# Patient Record
Sex: FEMALE | Race: BLACK OR AFRICAN AMERICAN | NOT HISPANIC OR LATINO | URBAN - METROPOLITAN AREA
[De-identification: names, ages, dates, MRNs, and addresses within clinical notes are randomized per-mention and may not be internally consistent; named-entity substitution may affect disease eponyms.]

---

## 2019-01-01 ENCOUNTER — OUTPATIENT (OUTPATIENT)
Dept: OUTPATIENT SERVICES | Facility: HOSPITAL | Age: 84
LOS: 1 days | Discharge: HOME | End: 2019-01-01

## 2019-01-01 ENCOUNTER — TRANSCRIPTION ENCOUNTER (OUTPATIENT)
Age: 84
End: 2019-01-01

## 2019-01-01 ENCOUNTER — INPATIENT (INPATIENT)
Facility: HOSPITAL | Age: 84
LOS: 8 days | Discharge: NURSING, REHAB OR LONG-TERM CARE | End: 2019-05-17
Attending: INTERNAL MEDICINE | Admitting: INTERNAL MEDICINE
Payer: MEDICARE

## 2019-01-01 VITALS
WEIGHT: 119.93 LBS | HEART RATE: 72 BPM | HEIGHT: 60 IN | OXYGEN SATURATION: 96 % | RESPIRATION RATE: 20 BRPM | TEMPERATURE: 98 F | DIASTOLIC BLOOD PRESSURE: 83 MMHG | SYSTOLIC BLOOD PRESSURE: 168 MMHG

## 2019-01-01 VITALS
SYSTOLIC BLOOD PRESSURE: 145 MMHG | DIASTOLIC BLOOD PRESSURE: 65 MMHG | TEMPERATURE: 99 F | HEART RATE: 73 BPM | RESPIRATION RATE: 18 BRPM

## 2019-01-01 DIAGNOSIS — Z90.49 ACQUIRED ABSENCE OF OTHER SPECIFIED PARTS OF DIGESTIVE TRACT: Chronic | ICD-10-CM
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
## 2019-01-01 DIAGNOSIS — Z95.1 PRESENCE OF AORTOCORONARY BYPASS GRAFT: ICD-10-CM

## 2019-01-01 DIAGNOSIS — D62 ACUTE POSTHEMORRHAGIC ANEMIA: ICD-10-CM

## 2019-01-01 DIAGNOSIS — N17.9 ACUTE KIDNEY FAILURE, UNSPECIFIED: ICD-10-CM

## 2019-01-01 DIAGNOSIS — I80.8 PHLEBITIS AND THROMBOPHLEBITIS OF OTHER SITES: ICD-10-CM

## 2019-01-01 DIAGNOSIS — I10 ESSENTIAL (PRIMARY) HYPERTENSION: ICD-10-CM

## 2019-01-01 DIAGNOSIS — N39.0 URINARY TRACT INFECTION, SITE NOT SPECIFIED: ICD-10-CM

## 2019-01-01 DIAGNOSIS — N18.4 CHRONIC KIDNEY DISEASE, STAGE 4 (SEVERE): ICD-10-CM

## 2019-01-01 DIAGNOSIS — I73.9 PERIPHERAL VASCULAR DISEASE, UNSPECIFIED: ICD-10-CM

## 2019-01-01 DIAGNOSIS — Z95.5 PRESENCE OF CORONARY ANGIOPLASTY IMPLANT AND GRAFT: ICD-10-CM
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
## 2019-01-01 DIAGNOSIS — I12.9 HYPERTENSIVE CHRONIC KIDNEY DISEASE WITH STAGE 1 THROUGH STAGE 4 CHRONIC KIDNEY DISEASE, OR UNSPECIFIED CHRONIC KIDNEY DISEASE: ICD-10-CM
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
## 2019-01-01 DIAGNOSIS — I25.10 ATHEROSCLEROTIC HEART DISEASE OF NATIVE CORONARY ARTERY WITHOUT ANGINA PECTORIS: ICD-10-CM

## 2019-01-01 DIAGNOSIS — M25.552 PAIN IN LEFT HIP: ICD-10-CM

## 2019-01-01 DIAGNOSIS — D64.9 ANEMIA, UNSPECIFIED: ICD-10-CM

## 2019-01-01 DIAGNOSIS — S72.22XA DISPLACED SUBTROCHANTERIC FRACTURE OF LEFT FEMUR, INITIAL ENCOUNTER FOR CLOSED FRACTURE: ICD-10-CM

## 2019-01-01 DIAGNOSIS — E83.39 OTHER DISORDERS OF PHOSPHORUS METABOLISM: ICD-10-CM

## 2019-01-01 DIAGNOSIS — R10.9 UNSPECIFIED ABDOMINAL PAIN: ICD-10-CM

## 2019-01-01 LAB
-  AMIKACIN: SIGNIFICANT CHANGE UP
-  AZTREONAM: SIGNIFICANT CHANGE UP
-  CEFEPIME: SIGNIFICANT CHANGE UP
-  CEFTAZIDIME: SIGNIFICANT CHANGE UP
-  CIPROFLOXACIN: SIGNIFICANT CHANGE UP
-  GENTAMICIN: SIGNIFICANT CHANGE UP
-  IMIPENEM: SIGNIFICANT CHANGE UP
-  LEVOFLOXACIN: SIGNIFICANT CHANGE UP
-  MEROPENEM: SIGNIFICANT CHANGE UP
-  PIPERACILLIN/TAZOBACTAM: SIGNIFICANT CHANGE UP
-  TOBRAMYCIN: SIGNIFICANT CHANGE UP
ABO RH CONFIRMATION: SIGNIFICANT CHANGE UP
ALBUMIN SERPL ELPH-MCNC: 2.3 G/DL — LOW (ref 3.5–5.2)
ALBUMIN SERPL ELPH-MCNC: 2.5 G/DL — LOW (ref 3.5–5.2)
ALBUMIN SERPL ELPH-MCNC: 2.5 G/DL — LOW (ref 3.5–5.2)
ALBUMIN SERPL ELPH-MCNC: 2.7 G/DL — LOW (ref 3.5–5.2)
ALBUMIN SERPL ELPH-MCNC: 2.8 G/DL — LOW (ref 3.5–5.2)
ALBUMIN SERPL ELPH-MCNC: 3.2 G/DL — LOW (ref 3.5–5.2)
ALBUMIN SERPL ELPH-MCNC: 3.7 G/DL — SIGNIFICANT CHANGE UP (ref 3.5–5.2)
ALBUMIN SERPL ELPH-MCNC: 3.9 G/DL — SIGNIFICANT CHANGE UP (ref 3.5–5.2)
ALP SERPL-CCNC: 35 U/L — SIGNIFICANT CHANGE UP (ref 30–115)
ALP SERPL-CCNC: 37 U/L — SIGNIFICANT CHANGE UP (ref 30–115)
ALP SERPL-CCNC: 38 U/L — SIGNIFICANT CHANGE UP (ref 30–115)
ALP SERPL-CCNC: 38 U/L — SIGNIFICANT CHANGE UP (ref 30–115)
ALP SERPL-CCNC: 40 U/L — SIGNIFICANT CHANGE UP (ref 30–115)
ALP SERPL-CCNC: 41 U/L — SIGNIFICANT CHANGE UP (ref 30–115)
ALP SERPL-CCNC: 44 U/L — SIGNIFICANT CHANGE UP (ref 30–115)
ALP SERPL-CCNC: 49 U/L — SIGNIFICANT CHANGE UP (ref 30–115)
ALP SERPL-CCNC: 58 U/L — SIGNIFICANT CHANGE UP (ref 30–115)
ALP SERPL-CCNC: 61 U/L — SIGNIFICANT CHANGE UP (ref 30–115)
ALT FLD-CCNC: 10 U/L — SIGNIFICANT CHANGE UP (ref 0–41)
ALT FLD-CCNC: 12 U/L — SIGNIFICANT CHANGE UP (ref 0–41)
ALT FLD-CCNC: 13 U/L — SIGNIFICANT CHANGE UP (ref 0–41)
ALT FLD-CCNC: 13 U/L — SIGNIFICANT CHANGE UP (ref 0–41)
ALT FLD-CCNC: 16 U/L — SIGNIFICANT CHANGE UP (ref 0–41)
ALT FLD-CCNC: 17 U/L — SIGNIFICANT CHANGE UP (ref 0–41)
ALT FLD-CCNC: 22 U/L — SIGNIFICANT CHANGE UP (ref 0–41)
ALT FLD-CCNC: 5 U/L — SIGNIFICANT CHANGE UP (ref 0–41)
ALT FLD-CCNC: 6 U/L — SIGNIFICANT CHANGE UP (ref 0–41)
ALT FLD-CCNC: 9 U/L — SIGNIFICANT CHANGE UP (ref 0–41)
ANION GAP SERPL CALC-SCNC: 10 MMOL/L — SIGNIFICANT CHANGE UP (ref 7–14)
ANION GAP SERPL CALC-SCNC: 11 MMOL/L — SIGNIFICANT CHANGE UP (ref 7–14)
ANION GAP SERPL CALC-SCNC: 12 MMOL/L — SIGNIFICANT CHANGE UP (ref 7–14)
ANION GAP SERPL CALC-SCNC: 13 MMOL/L — SIGNIFICANT CHANGE UP (ref 7–14)
ANION GAP SERPL CALC-SCNC: 9 MMOL/L — SIGNIFICANT CHANGE UP (ref 7–14)
ANISOCYTOSIS BLD QL: SLIGHT — SIGNIFICANT CHANGE UP
APPEARANCE UR: ABNORMAL
APPEARANCE UR: CLEAR — SIGNIFICANT CHANGE UP
APTT BLD: 30.2 SEC — SIGNIFICANT CHANGE UP (ref 27–39.2)
AST SERPL-CCNC: 18 U/L — SIGNIFICANT CHANGE UP (ref 0–41)
AST SERPL-CCNC: 19 U/L — SIGNIFICANT CHANGE UP (ref 0–41)
AST SERPL-CCNC: 20 U/L — SIGNIFICANT CHANGE UP (ref 0–41)
AST SERPL-CCNC: 20 U/L — SIGNIFICANT CHANGE UP (ref 0–41)
AST SERPL-CCNC: 22 U/L — SIGNIFICANT CHANGE UP (ref 0–41)
AST SERPL-CCNC: 22 U/L — SIGNIFICANT CHANGE UP (ref 0–41)
AST SERPL-CCNC: 23 U/L — SIGNIFICANT CHANGE UP (ref 0–41)
AST SERPL-CCNC: 24 U/L — SIGNIFICANT CHANGE UP (ref 0–41)
AST SERPL-CCNC: 25 U/L — SIGNIFICANT CHANGE UP (ref 0–41)
AST SERPL-CCNC: 27 U/L — SIGNIFICANT CHANGE UP (ref 0–41)
BASOPHILS # BLD AUTO: 0 K/UL — SIGNIFICANT CHANGE UP (ref 0–0.2)
BASOPHILS # BLD AUTO: 0.01 K/UL — SIGNIFICANT CHANGE UP (ref 0–0.2)
BASOPHILS # BLD AUTO: 0.01 K/UL — SIGNIFICANT CHANGE UP (ref 0–0.2)
BASOPHILS # BLD AUTO: 0.02 K/UL — SIGNIFICANT CHANGE UP (ref 0–0.2)
BASOPHILS # BLD AUTO: 0.02 K/UL — SIGNIFICANT CHANGE UP (ref 0–0.2)
BASOPHILS # BLD AUTO: 0.03 K/UL — SIGNIFICANT CHANGE UP (ref 0–0.2)
BASOPHILS # BLD AUTO: 0.03 K/UL — SIGNIFICANT CHANGE UP (ref 0–0.2)
BASOPHILS # BLD AUTO: 0.04 K/UL — SIGNIFICANT CHANGE UP (ref 0–0.2)
BASOPHILS NFR BLD AUTO: 0 % — SIGNIFICANT CHANGE UP (ref 0–1)
BASOPHILS NFR BLD AUTO: 0.1 % — SIGNIFICANT CHANGE UP (ref 0–1)
BASOPHILS NFR BLD AUTO: 0.1 % — SIGNIFICANT CHANGE UP (ref 0–1)
BASOPHILS NFR BLD AUTO: 0.2 % — SIGNIFICANT CHANGE UP (ref 0–1)
BASOPHILS NFR BLD AUTO: 0.2 % — SIGNIFICANT CHANGE UP (ref 0–1)
BASOPHILS NFR BLD AUTO: 0.3 % — SIGNIFICANT CHANGE UP (ref 0–1)
BASOPHILS NFR BLD AUTO: 0.4 % — SIGNIFICANT CHANGE UP (ref 0–1)
BASOPHILS NFR BLD AUTO: 0.4 % — SIGNIFICANT CHANGE UP (ref 0–1)
BASOPHILS NFR BLD AUTO: 0.5 % — SIGNIFICANT CHANGE UP (ref 0–1)
BASOPHILS NFR BLD AUTO: 0.5 % — SIGNIFICANT CHANGE UP (ref 0–1)
BILIRUB SERPL-MCNC: 0.3 MG/DL — SIGNIFICANT CHANGE UP (ref 0.2–1.2)
BILIRUB SERPL-MCNC: 0.4 MG/DL — SIGNIFICANT CHANGE UP (ref 0.2–1.2)
BILIRUB SERPL-MCNC: 0.4 MG/DL — SIGNIFICANT CHANGE UP (ref 0.2–1.2)
BILIRUB SERPL-MCNC: 0.5 MG/DL — SIGNIFICANT CHANGE UP (ref 0.2–1.2)
BILIRUB SERPL-MCNC: 0.6 MG/DL — SIGNIFICANT CHANGE UP (ref 0.2–1.2)
BILIRUB SERPL-MCNC: 0.8 MG/DL — SIGNIFICANT CHANGE UP (ref 0.2–1.2)
BILIRUB SERPL-MCNC: 0.8 MG/DL — SIGNIFICANT CHANGE UP (ref 0.2–1.2)
BILIRUB SERPL-MCNC: 1.5 MG/DL — HIGH (ref 0.2–1.2)
BILIRUB SERPL-MCNC: 1.5 MG/DL — HIGH (ref 0.2–1.2)
BILIRUB SERPL-MCNC: 1.8 MG/DL — HIGH (ref 0.2–1.2)
BILIRUB UR-MCNC: NEGATIVE — SIGNIFICANT CHANGE UP
BILIRUB UR-MCNC: NEGATIVE — SIGNIFICANT CHANGE UP
BLD GP AB SCN SERPL QL: SIGNIFICANT CHANGE UP
BUN SERPL-MCNC: 27 MG/DL — HIGH (ref 10–20)
BUN SERPL-MCNC: 27 MG/DL — HIGH (ref 10–20)
BUN SERPL-MCNC: 30 MG/DL — HIGH (ref 10–20)
BUN SERPL-MCNC: 30 MG/DL — HIGH (ref 10–20)
BUN SERPL-MCNC: 31 MG/DL — HIGH (ref 10–20)
BUN SERPL-MCNC: 33 MG/DL — HIGH (ref 10–20)
BUN SERPL-MCNC: 33 MG/DL — HIGH (ref 10–20)
BUN SERPL-MCNC: 40 MG/DL — HIGH (ref 10–20)
BUN SERPL-MCNC: 42 MG/DL — HIGH (ref 10–20)
BUN SERPL-MCNC: 50 MG/DL — HIGH (ref 10–20)
CALCIUM SERPL-MCNC: 7.2 MG/DL — LOW (ref 8.5–10.1)
CALCIUM SERPL-MCNC: 7.4 MG/DL — LOW (ref 8.5–10.1)
CALCIUM SERPL-MCNC: 7.4 MG/DL — LOW (ref 8.5–10.1)
CALCIUM SERPL-MCNC: 7.5 MG/DL — LOW (ref 8.5–10.1)
CALCIUM SERPL-MCNC: 7.6 MG/DL — LOW (ref 8.5–10.1)
CALCIUM SERPL-MCNC: 7.7 MG/DL — LOW (ref 8.5–10.1)
CALCIUM SERPL-MCNC: 7.9 MG/DL — LOW (ref 8.5–10.1)
CALCIUM SERPL-MCNC: 8.3 MG/DL — LOW (ref 8.5–10.1)
CALCIUM SERPL-MCNC: 8.5 MG/DL — SIGNIFICANT CHANGE UP (ref 8.5–10.1)
CALCIUM SERPL-MCNC: 8.9 MG/DL — SIGNIFICANT CHANGE UP (ref 8.5–10.1)
CHLORIDE SERPL-SCNC: 107 MMOL/L — SIGNIFICANT CHANGE UP (ref 98–110)
CHLORIDE SERPL-SCNC: 108 MMOL/L — SIGNIFICANT CHANGE UP (ref 98–110)
CHLORIDE SERPL-SCNC: 111 MMOL/L — HIGH (ref 98–110)
CO2 SERPL-SCNC: 22 MMOL/L — SIGNIFICANT CHANGE UP (ref 17–32)
CO2 SERPL-SCNC: 23 MMOL/L — SIGNIFICANT CHANGE UP (ref 17–32)
CO2 SERPL-SCNC: 24 MMOL/L — SIGNIFICANT CHANGE UP (ref 17–32)
CO2 SERPL-SCNC: 24 MMOL/L — SIGNIFICANT CHANGE UP (ref 17–32)
CO2 SERPL-SCNC: 25 MMOL/L — SIGNIFICANT CHANGE UP (ref 17–32)
COLOR SPEC: YELLOW — SIGNIFICANT CHANGE UP
COLOR SPEC: YELLOW — SIGNIFICANT CHANGE UP
CREAT ?TM UR-MCNC: 97 MG/DL — SIGNIFICANT CHANGE UP
CREAT SERPL-MCNC: 1.3 MG/DL — SIGNIFICANT CHANGE UP (ref 0.7–1.5)
CREAT SERPL-MCNC: 1.3 MG/DL — SIGNIFICANT CHANGE UP (ref 0.7–1.5)
CREAT SERPL-MCNC: 1.4 MG/DL — SIGNIFICANT CHANGE UP (ref 0.7–1.5)
CREAT SERPL-MCNC: 1.4 MG/DL — SIGNIFICANT CHANGE UP (ref 0.7–1.5)
CREAT SERPL-MCNC: 1.5 MG/DL — SIGNIFICANT CHANGE UP (ref 0.7–1.5)
CREAT SERPL-MCNC: 1.7 MG/DL — HIGH (ref 0.7–1.5)
CREAT SERPL-MCNC: 1.7 MG/DL — HIGH (ref 0.7–1.5)
CREAT SERPL-MCNC: 1.8 MG/DL — HIGH (ref 0.7–1.5)
CREAT SERPL-MCNC: 2.1 MG/DL — HIGH (ref 0.7–1.5)
CREAT SERPL-MCNC: 2.2 MG/DL — HIGH (ref 0.7–1.5)
CULTURE RESULTS: SIGNIFICANT CHANGE UP
DIFF PNL FLD: ABNORMAL
DIFF PNL FLD: ABNORMAL
EOSINOPHIL # BLD AUTO: 0 K/UL — SIGNIFICANT CHANGE UP (ref 0–0.7)
EOSINOPHIL # BLD AUTO: 0 K/UL — SIGNIFICANT CHANGE UP (ref 0–0.7)
EOSINOPHIL # BLD AUTO: 0.05 K/UL — SIGNIFICANT CHANGE UP (ref 0–0.7)
EOSINOPHIL # BLD AUTO: 0.08 K/UL — SIGNIFICANT CHANGE UP (ref 0–0.7)
EOSINOPHIL # BLD AUTO: 0.08 K/UL — SIGNIFICANT CHANGE UP (ref 0–0.7)
EOSINOPHIL # BLD AUTO: 0.15 K/UL — SIGNIFICANT CHANGE UP (ref 0–0.7)
EOSINOPHIL # BLD AUTO: 0.21 K/UL — SIGNIFICANT CHANGE UP (ref 0–0.7)
EOSINOPHIL # BLD AUTO: 0.22 K/UL — SIGNIFICANT CHANGE UP (ref 0–0.7)
EOSINOPHIL # BLD AUTO: 0.22 K/UL — SIGNIFICANT CHANGE UP (ref 0–0.7)
EOSINOPHIL # BLD AUTO: 0.27 K/UL — SIGNIFICANT CHANGE UP (ref 0–0.7)
EOSINOPHIL NFR BLD AUTO: 0 % — SIGNIFICANT CHANGE UP (ref 0–8)
EOSINOPHIL NFR BLD AUTO: 0 % — SIGNIFICANT CHANGE UP (ref 0–8)
EOSINOPHIL NFR BLD AUTO: 0.5 % — SIGNIFICANT CHANGE UP (ref 0–8)
EOSINOPHIL NFR BLD AUTO: 0.9 % — SIGNIFICANT CHANGE UP (ref 0–8)
EOSINOPHIL NFR BLD AUTO: 1 % — SIGNIFICANT CHANGE UP (ref 0–8)
EOSINOPHIL NFR BLD AUTO: 1.3 % — SIGNIFICANT CHANGE UP (ref 0–8)
EOSINOPHIL NFR BLD AUTO: 2.4 % — SIGNIFICANT CHANGE UP (ref 0–8)
EOSINOPHIL NFR BLD AUTO: 2.7 % — SIGNIFICANT CHANGE UP (ref 0–8)
EPI CELLS # UR: ABNORMAL /HPF
GIANT PLATELETS BLD QL SMEAR: PRESENT — SIGNIFICANT CHANGE UP
GLUCOSE SERPL-MCNC: 103 MG/DL — HIGH (ref 70–99)
GLUCOSE SERPL-MCNC: 113 MG/DL — HIGH (ref 70–99)
GLUCOSE SERPL-MCNC: 114 MG/DL — HIGH (ref 70–99)
GLUCOSE SERPL-MCNC: 114 MG/DL — HIGH (ref 70–99)
GLUCOSE SERPL-MCNC: 116 MG/DL — HIGH (ref 70–99)
GLUCOSE SERPL-MCNC: 138 MG/DL — HIGH (ref 70–99)
GLUCOSE SERPL-MCNC: 97 MG/DL — SIGNIFICANT CHANGE UP (ref 70–99)
GLUCOSE SERPL-MCNC: 97 MG/DL — SIGNIFICANT CHANGE UP (ref 70–99)
GLUCOSE SERPL-MCNC: 98 MG/DL — SIGNIFICANT CHANGE UP (ref 70–99)
GLUCOSE SERPL-MCNC: 98 MG/DL — SIGNIFICANT CHANGE UP (ref 70–99)
GLUCOSE UR QL: NEGATIVE MG/DL — SIGNIFICANT CHANGE UP
GLUCOSE UR QL: NEGATIVE MG/DL — SIGNIFICANT CHANGE UP
HCT VFR BLD CALC: 17.2 % — LOW (ref 37–47)
HCT VFR BLD CALC: 18.4 % — LOW (ref 37–47)
HCT VFR BLD CALC: 21 % — LOW (ref 37–47)
HCT VFR BLD CALC: 21.1 % — LOW (ref 37–47)
HCT VFR BLD CALC: 21.2 % — LOW (ref 37–47)
HCT VFR BLD CALC: 22 % — LOW (ref 37–47)
HCT VFR BLD CALC: 22.2 % — LOW (ref 37–47)
HCT VFR BLD CALC: 23.3 % — LOW (ref 37–47)
HCT VFR BLD CALC: 24.2 % — LOW (ref 37–47)
HCT VFR BLD CALC: 24.3 % — LOW (ref 37–47)
HCT VFR BLD CALC: 24.4 % — LOW (ref 37–47)
HCT VFR BLD CALC: 24.8 % — LOW (ref 37–47)
HCT VFR BLD CALC: 25.2 % — LOW (ref 37–47)
HCT VFR BLD CALC: 25.6 % — LOW (ref 37–47)
HCT VFR BLD CALC: 26 % — LOW (ref 37–47)
HCT VFR BLD CALC: 27.3 % — LOW (ref 37–47)
HCT VFR BLD CALC: 27.3 % — LOW (ref 37–47)
HCT VFR BLD CALC: 27.6 % — LOW (ref 37–47)
HCT VFR BLD CALC: 27.9 % — LOW (ref 37–47)
HGB BLD-MCNC: 6.1 G/DL — CRITICAL LOW (ref 12–16)
HGB BLD-MCNC: 6.3 G/DL — CRITICAL LOW (ref 12–16)
HGB BLD-MCNC: 7.3 G/DL — LOW (ref 12–16)
HGB BLD-MCNC: 7.3 G/DL — LOW (ref 12–16)
HGB BLD-MCNC: 7.4 G/DL — LOW (ref 12–16)
HGB BLD-MCNC: 7.5 G/DL — LOW (ref 12–16)
HGB BLD-MCNC: 7.7 G/DL — LOW (ref 12–16)
HGB BLD-MCNC: 8.1 G/DL — LOW (ref 12–16)
HGB BLD-MCNC: 8.2 G/DL — LOW (ref 12–16)
HGB BLD-MCNC: 8.3 G/DL — LOW (ref 12–16)
HGB BLD-MCNC: 8.3 G/DL — LOW (ref 12–16)
HGB BLD-MCNC: 8.4 G/DL — LOW (ref 12–16)
HGB BLD-MCNC: 8.6 G/DL — LOW (ref 12–16)
HGB BLD-MCNC: 8.7 G/DL — LOW (ref 12–16)
HGB BLD-MCNC: 8.8 G/DL — LOW (ref 12–16)
HGB BLD-MCNC: 9.4 G/DL — LOW (ref 12–16)
HGB BLD-MCNC: 9.6 G/DL — LOW (ref 12–16)
HGB BLD-MCNC: 9.8 G/DL — LOW (ref 12–16)
HGB BLD-MCNC: 9.8 G/DL — LOW (ref 12–16)
IMM GRANULOCYTES NFR BLD AUTO: 0.4 % — HIGH (ref 0.1–0.3)
IMM GRANULOCYTES NFR BLD AUTO: 0.6 % — HIGH (ref 0.1–0.3)
IMM GRANULOCYTES NFR BLD AUTO: 0.7 % — HIGH (ref 0.1–0.3)
IMM GRANULOCYTES NFR BLD AUTO: 0.7 % — HIGH (ref 0.1–0.3)
IMM GRANULOCYTES NFR BLD AUTO: 0.8 % — HIGH (ref 0.1–0.3)
IMM GRANULOCYTES NFR BLD AUTO: 1.2 % — HIGH (ref 0.1–0.3)
IMM GRANULOCYTES NFR BLD AUTO: 1.7 % — HIGH (ref 0.1–0.3)
IMM GRANULOCYTES NFR BLD AUTO: 1.9 % — HIGH (ref 0.1–0.3)
IMM GRANULOCYTES NFR BLD AUTO: 2.3 % — HIGH (ref 0.1–0.3)
INR BLD: 0.99 RATIO — SIGNIFICANT CHANGE UP (ref 0.65–1.3)
INR BLD: 1 RATIO — SIGNIFICANT CHANGE UP (ref 0.65–1.3)
IRON SATN MFR SERPL: 13 % — LOW (ref 15–50)
IRON SATN MFR SERPL: 32 UG/DL — LOW (ref 35–150)
KETONES UR-MCNC: NEGATIVE — SIGNIFICANT CHANGE UP
KETONES UR-MCNC: NEGATIVE — SIGNIFICANT CHANGE UP
LEUKOCYTE ESTERASE UR-ACNC: ABNORMAL
LEUKOCYTE ESTERASE UR-ACNC: NEGATIVE — SIGNIFICANT CHANGE UP
LYMPHOCYTES # BLD AUTO: 0.42 K/UL — LOW (ref 1.2–3.4)
LYMPHOCYTES # BLD AUTO: 0.44 K/UL — LOW (ref 1.2–3.4)
LYMPHOCYTES # BLD AUTO: 0.49 K/UL — LOW (ref 1.2–3.4)
LYMPHOCYTES # BLD AUTO: 0.56 K/UL — LOW (ref 1.2–3.4)
LYMPHOCYTES # BLD AUTO: 0.62 K/UL — LOW (ref 1.2–3.4)
LYMPHOCYTES # BLD AUTO: 0.63 K/UL — LOW (ref 1.2–3.4)
LYMPHOCYTES # BLD AUTO: 0.66 K/UL — LOW (ref 1.2–3.4)
LYMPHOCYTES # BLD AUTO: 0.69 K/UL — LOW (ref 1.2–3.4)
LYMPHOCYTES # BLD AUTO: 0.77 K/UL — LOW (ref 1.2–3.4)
LYMPHOCYTES # BLD AUTO: 1 K/UL — LOW (ref 1.2–3.4)
LYMPHOCYTES # BLD AUTO: 11.4 % — LOW (ref 20.5–51.1)
LYMPHOCYTES # BLD AUTO: 3.9 % — LOW (ref 20.5–51.1)
LYMPHOCYTES # BLD AUTO: 5.3 % — LOW (ref 20.5–51.1)
LYMPHOCYTES # BLD AUTO: 5.4 % — LOW (ref 20.5–51.1)
LYMPHOCYTES # BLD AUTO: 5.6 % — LOW (ref 20.5–51.1)
LYMPHOCYTES # BLD AUTO: 5.9 % — LOW (ref 20.5–51.1)
LYMPHOCYTES # BLD AUTO: 6.1 % — LOW (ref 20.5–51.1)
LYMPHOCYTES # BLD AUTO: 7.3 % — LOW (ref 20.5–51.1)
LYMPHOCYTES # BLD AUTO: 7.6 % — LOW (ref 20.5–51.1)
LYMPHOCYTES # BLD AUTO: 9.4 % — LOW (ref 20.5–51.1)
MAGNESIUM SERPL-MCNC: 1.9 MG/DL — SIGNIFICANT CHANGE UP (ref 1.8–2.4)
MAGNESIUM SERPL-MCNC: 2.1 MG/DL — SIGNIFICANT CHANGE UP (ref 1.8–2.4)
MAGNESIUM SERPL-MCNC: 2.2 MG/DL — SIGNIFICANT CHANGE UP (ref 1.8–2.4)
MAGNESIUM SERPL-MCNC: 2.3 MG/DL — SIGNIFICANT CHANGE UP (ref 1.8–2.4)
MAGNESIUM SERPL-MCNC: 2.3 MG/DL — SIGNIFICANT CHANGE UP (ref 1.8–2.4)
MAGNESIUM SERPL-MCNC: 2.4 MG/DL — SIGNIFICANT CHANGE UP (ref 1.8–2.4)
MANUAL SMEAR VERIFICATION: SIGNIFICANT CHANGE UP
MCHC RBC-ENTMCNC: 28 PG — SIGNIFICANT CHANGE UP (ref 27–31)
MCHC RBC-ENTMCNC: 28.1 PG — SIGNIFICANT CHANGE UP (ref 27–31)
MCHC RBC-ENTMCNC: 28.3 PG — SIGNIFICANT CHANGE UP (ref 27–31)
MCHC RBC-ENTMCNC: 28.4 PG — SIGNIFICANT CHANGE UP (ref 27–31)
MCHC RBC-ENTMCNC: 28.6 PG — SIGNIFICANT CHANGE UP (ref 27–31)
MCHC RBC-ENTMCNC: 28.7 PG — SIGNIFICANT CHANGE UP (ref 27–31)
MCHC RBC-ENTMCNC: 28.8 PG — SIGNIFICANT CHANGE UP (ref 27–31)
MCHC RBC-ENTMCNC: 28.9 PG — SIGNIFICANT CHANGE UP (ref 27–31)
MCHC RBC-ENTMCNC: 28.9 PG — SIGNIFICANT CHANGE UP (ref 27–31)
MCHC RBC-ENTMCNC: 29 PG — SIGNIFICANT CHANGE UP (ref 27–31)
MCHC RBC-ENTMCNC: 29.1 PG — SIGNIFICANT CHANGE UP (ref 27–31)
MCHC RBC-ENTMCNC: 29.4 PG — SIGNIFICANT CHANGE UP (ref 27–31)
MCHC RBC-ENTMCNC: 29.5 PG — SIGNIFICANT CHANGE UP (ref 27–31)
MCHC RBC-ENTMCNC: 33.5 G/DL — SIGNIFICANT CHANGE UP (ref 32–37)
MCHC RBC-ENTMCNC: 33.6 G/DL — SIGNIFICANT CHANGE UP (ref 32–37)
MCHC RBC-ENTMCNC: 33.6 G/DL — SIGNIFICANT CHANGE UP (ref 32–37)
MCHC RBC-ENTMCNC: 33.8 G/DL — SIGNIFICANT CHANGE UP (ref 32–37)
MCHC RBC-ENTMCNC: 34.1 G/DL — SIGNIFICANT CHANGE UP (ref 32–37)
MCHC RBC-ENTMCNC: 34.2 G/DL — SIGNIFICANT CHANGE UP (ref 32–37)
MCHC RBC-ENTMCNC: 34.3 G/DL — SIGNIFICANT CHANGE UP (ref 32–37)
MCHC RBC-ENTMCNC: 34.4 G/DL — SIGNIFICANT CHANGE UP (ref 32–37)
MCHC RBC-ENTMCNC: 34.4 G/DL — SIGNIFICANT CHANGE UP (ref 32–37)
MCHC RBC-ENTMCNC: 34.5 G/DL — SIGNIFICANT CHANGE UP (ref 32–37)
MCHC RBC-ENTMCNC: 34.6 G/DL — SIGNIFICANT CHANGE UP (ref 32–37)
MCHC RBC-ENTMCNC: 34.6 G/DL — SIGNIFICANT CHANGE UP (ref 32–37)
MCHC RBC-ENTMCNC: 34.7 G/DL — SIGNIFICANT CHANGE UP (ref 32–37)
MCHC RBC-ENTMCNC: 34.8 G/DL — SIGNIFICANT CHANGE UP (ref 32–37)
MCHC RBC-ENTMCNC: 35.1 G/DL — SIGNIFICANT CHANGE UP (ref 32–37)
MCHC RBC-ENTMCNC: 35.2 G/DL — SIGNIFICANT CHANGE UP (ref 32–37)
MCHC RBC-ENTMCNC: 35.2 G/DL — SIGNIFICANT CHANGE UP (ref 32–37)
MCHC RBC-ENTMCNC: 35.5 G/DL — SIGNIFICANT CHANGE UP (ref 32–37)
MCHC RBC-ENTMCNC: 35.5 G/DL — SIGNIFICANT CHANGE UP (ref 32–37)
MCV RBC AUTO: 79.7 FL — LOW (ref 81–99)
MCV RBC AUTO: 79.8 FL — LOW (ref 81–99)
MCV RBC AUTO: 81.3 FL — SIGNIFICANT CHANGE UP (ref 81–99)
MCV RBC AUTO: 81.9 FL — SIGNIFICANT CHANGE UP (ref 81–99)
MCV RBC AUTO: 81.9 FL — SIGNIFICANT CHANGE UP (ref 81–99)
MCV RBC AUTO: 82.1 FL — SIGNIFICANT CHANGE UP (ref 81–99)
MCV RBC AUTO: 82.2 FL — SIGNIFICANT CHANGE UP (ref 81–99)
MCV RBC AUTO: 82.4 FL — SIGNIFICANT CHANGE UP (ref 81–99)
MCV RBC AUTO: 82.9 FL — SIGNIFICANT CHANGE UP (ref 81–99)
MCV RBC AUTO: 83.1 FL — SIGNIFICANT CHANGE UP (ref 81–99)
MCV RBC AUTO: 83.3 FL — SIGNIFICANT CHANGE UP (ref 81–99)
MCV RBC AUTO: 83.4 FL — SIGNIFICANT CHANGE UP (ref 81–99)
MCV RBC AUTO: 83.5 FL — SIGNIFICANT CHANGE UP (ref 81–99)
MCV RBC AUTO: 84 FL — SIGNIFICANT CHANGE UP (ref 81–99)
MCV RBC AUTO: 84.3 FL — SIGNIFICANT CHANGE UP (ref 81–99)
MCV RBC AUTO: 84.4 FL — SIGNIFICANT CHANGE UP (ref 81–99)
MCV RBC AUTO: 84.4 FL — SIGNIFICANT CHANGE UP (ref 81–99)
MCV RBC AUTO: 85.5 FL — SIGNIFICANT CHANGE UP (ref 81–99)
MCV RBC AUTO: 85.6 FL — SIGNIFICANT CHANGE UP (ref 81–99)
METAMYELOCYTES # FLD: 2.7 % — HIGH (ref 0–0)
METHOD TYPE: SIGNIFICANT CHANGE UP
MONOCYTES # BLD AUTO: 0.5 K/UL — SIGNIFICANT CHANGE UP (ref 0.1–0.6)
MONOCYTES # BLD AUTO: 0.57 K/UL — SIGNIFICANT CHANGE UP (ref 0.1–0.6)
MONOCYTES # BLD AUTO: 0.88 K/UL — HIGH (ref 0.1–0.6)
MONOCYTES # BLD AUTO: 0.93 K/UL — HIGH (ref 0.1–0.6)
MONOCYTES # BLD AUTO: 1.18 K/UL — HIGH (ref 0.1–0.6)
MONOCYTES # BLD AUTO: 1.27 K/UL — HIGH (ref 0.1–0.6)
MONOCYTES # BLD AUTO: 1.41 K/UL — HIGH (ref 0.1–0.6)
MONOCYTES # BLD AUTO: 1.5 K/UL — HIGH (ref 0.1–0.6)
MONOCYTES # BLD AUTO: 1.59 K/UL — HIGH (ref 0.1–0.6)
MONOCYTES # BLD AUTO: 1.68 K/UL — HIGH (ref 0.1–0.6)
MONOCYTES NFR BLD AUTO: 10 % — HIGH (ref 1.7–9.3)
MONOCYTES NFR BLD AUTO: 11.2 % — HIGH (ref 1.7–9.3)
MONOCYTES NFR BLD AUTO: 11.9 % — HIGH (ref 1.7–9.3)
MONOCYTES NFR BLD AUTO: 12.7 % — HIGH (ref 1.7–9.3)
MONOCYTES NFR BLD AUTO: 14.2 % — HIGH (ref 1.7–9.3)
MONOCYTES NFR BLD AUTO: 14.8 % — HIGH (ref 1.7–9.3)
MONOCYTES NFR BLD AUTO: 15.8 % — HIGH (ref 1.7–9.3)
MONOCYTES NFR BLD AUTO: 17 % — HIGH (ref 1.7–9.3)
MONOCYTES NFR BLD AUTO: 5.5 % — SIGNIFICANT CHANGE UP (ref 1.7–9.3)
MONOCYTES NFR BLD AUTO: 6.9 % — SIGNIFICANT CHANGE UP (ref 1.7–9.3)
NEUTROPHILS # BLD AUTO: 5.82 K/UL — SIGNIFICANT CHANGE UP (ref 1.4–6.5)
NEUTROPHILS # BLD AUTO: 6.07 K/UL — SIGNIFICANT CHANGE UP (ref 1.4–6.5)
NEUTROPHILS # BLD AUTO: 6.72 K/UL — HIGH (ref 1.4–6.5)
NEUTROPHILS # BLD AUTO: 6.84 K/UL — HIGH (ref 1.4–6.5)
NEUTROPHILS # BLD AUTO: 7.3 K/UL — HIGH (ref 1.4–6.5)
NEUTROPHILS # BLD AUTO: 7.38 K/UL — HIGH (ref 1.4–6.5)
NEUTROPHILS # BLD AUTO: 7.45 K/UL — HIGH (ref 1.4–6.5)
NEUTROPHILS # BLD AUTO: 8.26 K/UL — HIGH (ref 1.4–6.5)
NEUTROPHILS # BLD AUTO: 8.7 K/UL — HIGH (ref 1.4–6.5)
NEUTROPHILS # BLD AUTO: 9.62 K/UL — HIGH (ref 1.4–6.5)
NEUTROPHILS NFR BLD AUTO: 70.3 % — SIGNIFICANT CHANGE UP (ref 42.2–75.2)
NEUTROPHILS NFR BLD AUTO: 73.2 % — SIGNIFICANT CHANGE UP (ref 42.2–75.2)
NEUTROPHILS NFR BLD AUTO: 76.8 % — HIGH (ref 42.2–75.2)
NEUTROPHILS NFR BLD AUTO: 77.7 % — HIGH (ref 42.2–75.2)
NEUTROPHILS NFR BLD AUTO: 77.9 % — HIGH (ref 42.2–75.2)
NEUTROPHILS NFR BLD AUTO: 78.3 % — HIGH (ref 42.2–75.2)
NEUTROPHILS NFR BLD AUTO: 78.4 % — HIGH (ref 42.2–75.2)
NEUTROPHILS NFR BLD AUTO: 81.6 % — HIGH (ref 42.2–75.2)
NEUTROPHILS NFR BLD AUTO: 81.8 % — HIGH (ref 42.2–75.2)
NEUTROPHILS NFR BLD AUTO: 84.4 % — HIGH (ref 42.2–75.2)
NEUTS BAND # BLD: 0.9 % — SIGNIFICANT CHANGE UP (ref 0–6)
NITRITE UR-MCNC: NEGATIVE — SIGNIFICANT CHANGE UP
NITRITE UR-MCNC: POSITIVE
NRBC # BLD: 0 /100 WBCS — SIGNIFICANT CHANGE UP (ref 0–0)
NRBC # BLD: 1 /100 WBCS — HIGH (ref 0–0)
NRBC # BLD: 2 /100 WBCS — HIGH (ref 0–0)
NRBC # BLD: 4 /100 — HIGH (ref 0–0)
NRBC # BLD: SIGNIFICANT CHANGE UP /100 WBCS (ref 0–0)
ORGANISM # SPEC MICROSCOPIC CNT: SIGNIFICANT CHANGE UP
ORGANISM # SPEC MICROSCOPIC CNT: SIGNIFICANT CHANGE UP
OSMOLALITY UR: 580 MOS/KG — SIGNIFICANT CHANGE UP (ref 50–1400)
PH UR: 6 — SIGNIFICANT CHANGE UP (ref 5–8)
PH UR: 6.5 — SIGNIFICANT CHANGE UP (ref 5–8)
PHOSPHATE SERPL-MCNC: 1.9 MG/DL — LOW (ref 2.1–4.9)
PHOSPHATE SERPL-MCNC: 2.2 MG/DL — SIGNIFICANT CHANGE UP (ref 2.1–4.9)
PHOSPHATE SERPL-MCNC: 2.6 MG/DL — SIGNIFICANT CHANGE UP (ref 2.1–4.9)
PHOSPHATE SERPL-MCNC: 2.7 MG/DL — SIGNIFICANT CHANGE UP (ref 2.1–4.9)
PHOSPHATE SERPL-MCNC: 5.5 MG/DL — HIGH (ref 2.1–4.9)
PLAT MORPH BLD: ABNORMAL
PLATELET # BLD AUTO: 128 K/UL — LOW (ref 130–400)
PLATELET # BLD AUTO: 128 K/UL — LOW (ref 130–400)
PLATELET # BLD AUTO: 130 K/UL — SIGNIFICANT CHANGE UP (ref 130–400)
PLATELET # BLD AUTO: 130 K/UL — SIGNIFICANT CHANGE UP (ref 130–400)
PLATELET # BLD AUTO: 134 K/UL — SIGNIFICANT CHANGE UP (ref 130–400)
PLATELET # BLD AUTO: 141 K/UL — SIGNIFICANT CHANGE UP (ref 130–400)
PLATELET # BLD AUTO: 148 K/UL — SIGNIFICANT CHANGE UP (ref 130–400)
PLATELET # BLD AUTO: 153 K/UL — SIGNIFICANT CHANGE UP (ref 130–400)
PLATELET # BLD AUTO: 153 K/UL — SIGNIFICANT CHANGE UP (ref 130–400)
PLATELET # BLD AUTO: 160 K/UL — SIGNIFICANT CHANGE UP (ref 130–400)
PLATELET # BLD AUTO: 163 K/UL — SIGNIFICANT CHANGE UP (ref 130–400)
PLATELET # BLD AUTO: 165 K/UL — SIGNIFICANT CHANGE UP (ref 130–400)
PLATELET # BLD AUTO: 169 K/UL — SIGNIFICANT CHANGE UP (ref 130–400)
PLATELET # BLD AUTO: 170 K/UL — SIGNIFICANT CHANGE UP (ref 130–400)
PLATELET # BLD AUTO: 181 K/UL — SIGNIFICANT CHANGE UP (ref 130–400)
PLATELET # BLD AUTO: 188 K/UL — SIGNIFICANT CHANGE UP (ref 130–400)
PLATELET # BLD AUTO: 197 K/UL — SIGNIFICANT CHANGE UP (ref 130–400)
PLATELET # BLD AUTO: 205 K/UL — SIGNIFICANT CHANGE UP (ref 130–400)
PLATELET # BLD AUTO: 215 K/UL — SIGNIFICANT CHANGE UP (ref 130–400)
POTASSIUM SERPL-MCNC: 3.8 MMOL/L — SIGNIFICANT CHANGE UP (ref 3.5–5)
POTASSIUM SERPL-MCNC: 3.9 MMOL/L — SIGNIFICANT CHANGE UP (ref 3.5–5)
POTASSIUM SERPL-MCNC: 3.9 MMOL/L — SIGNIFICANT CHANGE UP (ref 3.5–5)
POTASSIUM SERPL-MCNC: 4 MMOL/L — SIGNIFICANT CHANGE UP (ref 3.5–5)
POTASSIUM SERPL-MCNC: 4.2 MMOL/L — SIGNIFICANT CHANGE UP (ref 3.5–5)
POTASSIUM SERPL-MCNC: 4.3 MMOL/L — SIGNIFICANT CHANGE UP (ref 3.5–5)
POTASSIUM SERPL-MCNC: 4.6 MMOL/L — SIGNIFICANT CHANGE UP (ref 3.5–5)
POTASSIUM SERPL-SCNC: 3.8 MMOL/L — SIGNIFICANT CHANGE UP (ref 3.5–5)
POTASSIUM SERPL-SCNC: 3.9 MMOL/L — SIGNIFICANT CHANGE UP (ref 3.5–5)
POTASSIUM SERPL-SCNC: 3.9 MMOL/L — SIGNIFICANT CHANGE UP (ref 3.5–5)
POTASSIUM SERPL-SCNC: 4 MMOL/L — SIGNIFICANT CHANGE UP (ref 3.5–5)
POTASSIUM SERPL-SCNC: 4.2 MMOL/L — SIGNIFICANT CHANGE UP (ref 3.5–5)
POTASSIUM SERPL-SCNC: 4.3 MMOL/L — SIGNIFICANT CHANGE UP (ref 3.5–5)
POTASSIUM SERPL-SCNC: 4.6 MMOL/L — SIGNIFICANT CHANGE UP (ref 3.5–5)
PROT ?TM UR-MCNC: 46 MG/DLG/24H — SIGNIFICANT CHANGE UP
PROT SERPL-MCNC: 4.4 G/DL — LOW (ref 6–8)
PROT SERPL-MCNC: 4.5 G/DL — LOW (ref 6–8)
PROT SERPL-MCNC: 4.5 G/DL — LOW (ref 6–8)
PROT SERPL-MCNC: 4.6 G/DL — LOW (ref 6–8)
PROT SERPL-MCNC: 4.7 G/DL — LOW (ref 6–8)
PROT SERPL-MCNC: 4.9 G/DL — LOW (ref 6–8)
PROT SERPL-MCNC: 5.2 G/DL — LOW (ref 6–8)
PROT SERPL-MCNC: 5.3 G/DL — LOW (ref 6–8)
PROT SERPL-MCNC: 6.1 G/DL — SIGNIFICANT CHANGE UP (ref 6–8)
PROT SERPL-MCNC: 6.7 G/DL — SIGNIFICANT CHANGE UP (ref 6–8)
PROT UR-MCNC: 30 MG/DL
PROT UR-MCNC: ABNORMAL MG/DL
PROT/CREAT UR-RTO: 0.5 RATIO — HIGH (ref 0–0.2)
PROTHROM AB SERPL-ACNC: 11.4 SEC — SIGNIFICANT CHANGE UP (ref 9.95–12.87)
PROTHROM AB SERPL-ACNC: 11.5 SEC — SIGNIFICANT CHANGE UP (ref 9.95–12.87)
RBC # BLD: 2.07 M/UL — LOW (ref 4.2–5.4)
RBC # BLD: 2.18 M/UL — LOW (ref 4.2–5.4)
RBC # BLD: 2.52 M/UL — LOW (ref 4.2–5.4)
RBC # BLD: 2.57 M/UL — LOW (ref 4.2–5.4)
RBC # BLD: 2.58 M/UL — LOW (ref 4.2–5.4)
RBC # BLD: 2.62 M/UL — LOW (ref 4.2–5.4)
RBC # BLD: 2.71 M/UL — LOW (ref 4.2–5.4)
RBC # BLD: 2.79 M/UL — LOW (ref 4.2–5.4)
RBC # BLD: 2.85 M/UL — LOW (ref 4.2–5.4)
RBC # BLD: 2.87 M/UL — LOW (ref 4.2–5.4)
RBC # BLD: 2.9 M/UL — LOW (ref 4.2–5.4)
RBC # BLD: 2.93 M/UL — LOW (ref 4.2–5.4)
RBC # BLD: 3.06 M/UL — LOW (ref 4.2–5.4)
RBC # BLD: 3.07 M/UL — LOW (ref 4.2–5.4)
RBC # BLD: 3.08 M/UL — LOW (ref 4.2–5.4)
RBC # BLD: 3.36 M/UL — LOW (ref 4.2–5.4)
RBC # BLD: 3.37 M/UL — LOW (ref 4.2–5.4)
RBC # BLD: 3.42 M/UL — LOW (ref 4.2–5.4)
RBC # BLD: 3.5 M/UL — LOW (ref 4.2–5.4)
RBC # FLD: 14.7 % — HIGH (ref 11.5–14.5)
RBC # FLD: 14.8 % — HIGH (ref 11.5–14.5)
RBC # FLD: 14.9 % — HIGH (ref 11.5–14.5)
RBC # FLD: 15.1 % — HIGH (ref 11.5–14.5)
RBC # FLD: 15.2 % — HIGH (ref 11.5–14.5)
RBC # FLD: 15.3 % — HIGH (ref 11.5–14.5)
RBC # FLD: 15.3 % — HIGH (ref 11.5–14.5)
RBC # FLD: 15.4 % — HIGH (ref 11.5–14.5)
RBC # FLD: 15.4 % — HIGH (ref 11.5–14.5)
RBC # FLD: 15.6 % — HIGH (ref 11.5–14.5)
RBC # FLD: 15.7 % — HIGH (ref 11.5–14.5)
RBC # FLD: 15.7 % — HIGH (ref 11.5–14.5)
RBC # FLD: 15.8 % — HIGH (ref 11.5–14.5)
RBC # FLD: 15.8 % — HIGH (ref 11.5–14.5)
RBC # FLD: 15.9 % — HIGH (ref 11.5–14.5)
RBC # FLD: 16 % — HIGH (ref 11.5–14.5)
RBC # FLD: 16.1 % — HIGH (ref 11.5–14.5)
RBC # FLD: 16.5 % — HIGH (ref 11.5–14.5)
RBC # FLD: 16.6 % — HIGH (ref 11.5–14.5)
RBC BLD AUTO: NORMAL — SIGNIFICANT CHANGE UP
RBC CASTS # UR COMP ASSIST: SIGNIFICANT CHANGE UP /HPF
SODIUM SERPL-SCNC: 140 MMOL/L — SIGNIFICANT CHANGE UP (ref 135–146)
SODIUM SERPL-SCNC: 141 MMOL/L — SIGNIFICANT CHANGE UP (ref 135–146)
SODIUM SERPL-SCNC: 141 MMOL/L — SIGNIFICANT CHANGE UP (ref 135–146)
SODIUM SERPL-SCNC: 142 MMOL/L — SIGNIFICANT CHANGE UP (ref 135–146)
SODIUM SERPL-SCNC: 142 MMOL/L — SIGNIFICANT CHANGE UP (ref 135–146)
SODIUM SERPL-SCNC: 143 MMOL/L — SIGNIFICANT CHANGE UP (ref 135–146)
SODIUM SERPL-SCNC: 143 MMOL/L — SIGNIFICANT CHANGE UP (ref 135–146)
SODIUM SERPL-SCNC: 144 MMOL/L — SIGNIFICANT CHANGE UP (ref 135–146)
SODIUM UR-SCNC: 44 MMOL/L — SIGNIFICANT CHANGE UP
SP GR SPEC: 1.01 — SIGNIFICANT CHANGE UP (ref 1.01–1.03)
SP GR SPEC: 1.02 — SIGNIFICANT CHANGE UP (ref 1.01–1.03)
SPECIMEN SOURCE: SIGNIFICANT CHANGE UP
TIBC SERPL-MCNC: 249 UG/DL — SIGNIFICANT CHANGE UP (ref 220–430)
TOXIC GRANULES BLD QL SMEAR: PRESENT — SIGNIFICANT CHANGE UP
TYPE + AB SCN PNL BLD: SIGNIFICANT CHANGE UP
UIBC SERPL-MCNC: 217 UG/DL — SIGNIFICANT CHANGE UP (ref 110–370)
UROBILINOGEN FLD QL: 1 MG/DL (ref 0.2–0.2)
UROBILINOGEN FLD QL: 2 MG/DL (ref 0.2–0.2)
VARIANT LYMPHS # BLD: 0.9 % — SIGNIFICANT CHANGE UP (ref 0–5)
WBC # BLD: 10.07 K/UL — SIGNIFICANT CHANGE UP (ref 4.8–10.8)
WBC # BLD: 10.16 K/UL — SIGNIFICANT CHANGE UP (ref 4.8–10.8)
WBC # BLD: 10.53 K/UL — SIGNIFICANT CHANGE UP (ref 4.8–10.8)
WBC # BLD: 11.33 K/UL — HIGH (ref 4.8–10.8)
WBC # BLD: 11.39 K/UL — HIGH (ref 4.8–10.8)
WBC # BLD: 11.4 K/UL — HIGH (ref 4.8–10.8)
WBC # BLD: 11.58 K/UL — HIGH (ref 4.8–10.8)
WBC # BLD: 12.42 K/UL — HIGH (ref 4.8–10.8)
WBC # BLD: 14.15 K/UL — HIGH (ref 4.8–10.8)
WBC # BLD: 7.75 K/UL — SIGNIFICANT CHANGE UP (ref 4.8–10.8)
WBC # BLD: 7.79 K/UL — SIGNIFICANT CHANGE UP (ref 4.8–10.8)
WBC # BLD: 8.05 K/UL — SIGNIFICANT CHANGE UP (ref 4.8–10.8)
WBC # BLD: 8.23 K/UL — SIGNIFICANT CHANGE UP (ref 4.8–10.8)
WBC # BLD: 8.28 K/UL — SIGNIFICANT CHANGE UP (ref 4.8–10.8)
WBC # BLD: 8.8 K/UL — SIGNIFICANT CHANGE UP (ref 4.8–10.8)
WBC # BLD: 9.01 K/UL — SIGNIFICANT CHANGE UP (ref 4.8–10.8)
WBC # BLD: 9.31 K/UL — SIGNIFICANT CHANGE UP (ref 4.8–10.8)
WBC # BLD: 9.31 K/UL — SIGNIFICANT CHANGE UP (ref 4.8–10.8)
WBC # BLD: 9.94 K/UL — SIGNIFICANT CHANGE UP (ref 4.8–10.8)
WBC # FLD AUTO: 10.07 K/UL — SIGNIFICANT CHANGE UP (ref 4.8–10.8)
WBC # FLD AUTO: 10.16 K/UL — SIGNIFICANT CHANGE UP (ref 4.8–10.8)
WBC # FLD AUTO: 10.53 K/UL — SIGNIFICANT CHANGE UP (ref 4.8–10.8)
WBC # FLD AUTO: 11.33 K/UL — HIGH (ref 4.8–10.8)
WBC # FLD AUTO: 11.39 K/UL — HIGH (ref 4.8–10.8)
WBC # FLD AUTO: 11.4 K/UL — HIGH (ref 4.8–10.8)
WBC # FLD AUTO: 11.58 K/UL — HIGH (ref 4.8–10.8)
WBC # FLD AUTO: 12.42 K/UL — HIGH (ref 4.8–10.8)
WBC # FLD AUTO: 14.15 K/UL — HIGH (ref 4.8–10.8)
WBC # FLD AUTO: 7.75 K/UL — SIGNIFICANT CHANGE UP (ref 4.8–10.8)
WBC # FLD AUTO: 7.79 K/UL — SIGNIFICANT CHANGE UP (ref 4.8–10.8)
WBC # FLD AUTO: 8.05 K/UL — SIGNIFICANT CHANGE UP (ref 4.8–10.8)
WBC # FLD AUTO: 8.23 K/UL — SIGNIFICANT CHANGE UP (ref 4.8–10.8)
WBC # FLD AUTO: 8.28 K/UL — SIGNIFICANT CHANGE UP (ref 4.8–10.8)
WBC # FLD AUTO: 8.8 K/UL — SIGNIFICANT CHANGE UP (ref 4.8–10.8)
WBC # FLD AUTO: 9.01 K/UL — SIGNIFICANT CHANGE UP (ref 4.8–10.8)
WBC # FLD AUTO: 9.31 K/UL — SIGNIFICANT CHANGE UP (ref 4.8–10.8)
WBC # FLD AUTO: 9.31 K/UL — SIGNIFICANT CHANGE UP (ref 4.8–10.8)
WBC # FLD AUTO: 9.94 K/UL — SIGNIFICANT CHANGE UP (ref 4.8–10.8)

## 2019-01-01 PROCEDURE — 76770 US EXAM ABDO BACK WALL COMP: CPT | Mod: 26

## 2019-01-01 PROCEDURE — 93970 EXTREMITY STUDY: CPT | Mod: 26

## 2019-01-01 PROCEDURE — 73552 X-RAY EXAM OF FEMUR 2/>: CPT | Mod: 26,LT

## 2019-01-01 PROCEDURE — 93010 ELECTROCARDIOGRAM REPORT: CPT

## 2019-01-01 PROCEDURE — 71045 X-RAY EXAM CHEST 1 VIEW: CPT | Mod: 26

## 2019-01-01 PROCEDURE — 73560 X-RAY EXAM OF KNEE 1 OR 2: CPT | Mod: 26,LT

## 2019-01-01 PROCEDURE — 73700 CT LOWER EXTREMITY W/O DYE: CPT | Mod: 26,LT

## 2019-01-01 PROCEDURE — 99222 1ST HOSP IP/OBS MODERATE 55: CPT

## 2019-01-01 PROCEDURE — 93971 EXTREMITY STUDY: CPT | Mod: 26,LT

## 2019-01-01 PROCEDURE — 73502 X-RAY EXAM HIP UNI 2-3 VIEWS: CPT | Mod: 26,LT

## 2019-01-01 PROCEDURE — 93306 TTE W/DOPPLER COMPLETE: CPT | Mod: 26

## 2019-01-01 PROCEDURE — 99285 EMERGENCY DEPT VISIT HI MDM: CPT

## 2019-01-01 RX ORDER — PANTOPRAZOLE SODIUM 20 MG/1
40 TABLET, DELAYED RELEASE ORAL
Refills: 0 | Status: DISCONTINUED | OUTPATIENT
Start: 2019-01-01 | End: 2019-01-01

## 2019-01-01 RX ORDER — ACETAMINOPHEN 500 MG
650 TABLET ORAL ONCE
Refills: 0 | Status: COMPLETED | OUTPATIENT
Start: 2019-01-01 | End: 2019-01-01

## 2019-01-01 RX ORDER — SODIUM,POTASSIUM PHOSPHATES 278-250MG
1 POWDER IN PACKET (EA) ORAL
Refills: 0 | Status: DISCONTINUED | OUTPATIENT
Start: 2019-01-01 | End: 2019-01-01

## 2019-01-01 RX ORDER — SODIUM CHLORIDE 9 MG/ML
1000 INJECTION INTRAMUSCULAR; INTRAVENOUS; SUBCUTANEOUS
Refills: 0 | Status: DISCONTINUED | OUTPATIENT
Start: 2019-01-01 | End: 2019-01-01

## 2019-01-01 RX ORDER — ASPIRIN/CALCIUM CARB/MAGNESIUM 324 MG
81 TABLET ORAL DAILY
Refills: 0 | Status: DISCONTINUED | OUTPATIENT
Start: 2019-01-01 | End: 2019-01-01

## 2019-01-01 RX ORDER — MORPHINE SULFATE 50 MG/1
3 CAPSULE, EXTENDED RELEASE ORAL
Refills: 0 | Status: DISCONTINUED | OUTPATIENT
Start: 2019-01-01 | End: 2019-01-01

## 2019-01-01 RX ORDER — CHLORHEXIDINE GLUCONATE 213 G/1000ML
1 SOLUTION TOPICAL
Refills: 0 | Status: DISCONTINUED | OUTPATIENT
Start: 2019-01-01 | End: 2019-01-01

## 2019-01-01 RX ORDER — HEPARIN SODIUM 5000 [USP'U]/ML
5000 INJECTION INTRAVENOUS; SUBCUTANEOUS EVERY 8 HOURS
Refills: 0 | Status: DISCONTINUED | OUTPATIENT
Start: 2019-01-01 | End: 2019-01-01

## 2019-01-01 RX ORDER — ONDANSETRON 8 MG/1
4 TABLET, FILM COATED ORAL ONCE
Refills: 0 | Status: COMPLETED | OUTPATIENT
Start: 2019-01-01 | End: 2019-01-01

## 2019-01-01 RX ORDER — HEPARIN SODIUM 5000 [USP'U]/ML
5000 INJECTION INTRAVENOUS; SUBCUTANEOUS EVERY 12 HOURS
Refills: 0 | Status: DISCONTINUED | OUTPATIENT
Start: 2019-01-01 | End: 2019-01-01

## 2019-01-01 RX ORDER — SODIUM CHLORIDE 9 MG/ML
1000 INJECTION, SOLUTION INTRAVENOUS
Refills: 0 | Status: DISCONTINUED | OUTPATIENT
Start: 2019-01-01 | End: 2019-01-01

## 2019-01-01 RX ORDER — ATORVASTATIN CALCIUM 80 MG/1
20 TABLET, FILM COATED ORAL AT BEDTIME
Refills: 0 | Status: DISCONTINUED | OUTPATIENT
Start: 2019-01-01 | End: 2019-01-01

## 2019-01-01 RX ORDER — LABETALOL HCL 100 MG
200 TABLET ORAL ONCE
Refills: 0 | Status: COMPLETED | OUTPATIENT
Start: 2019-01-01 | End: 2019-01-01

## 2019-01-01 RX ORDER — ASPIRIN/CALCIUM CARB/MAGNESIUM 324 MG
1 TABLET ORAL
Qty: 0 | Refills: 0 | DISCHARGE
Start: 2019-01-01

## 2019-01-01 RX ORDER — CILOSTAZOL 100 MG/1
100 TABLET ORAL
Refills: 0 | Status: DISCONTINUED | OUTPATIENT
Start: 2019-01-01 | End: 2019-01-01

## 2019-01-01 RX ORDER — CILOSTAZOL 100 MG/1
100 TABLET ORAL EVERY 12 HOURS
Refills: 0 | Status: DISCONTINUED | OUTPATIENT
Start: 2019-01-01 | End: 2019-01-01

## 2019-01-01 RX ORDER — FERROUS SULFATE 325(65) MG
325 TABLET ORAL DAILY
Refills: 0 | Status: DISCONTINUED | OUTPATIENT
Start: 2019-01-01 | End: 2019-01-01

## 2019-01-01 RX ORDER — CLOPIDOGREL BISULFATE 75 MG/1
75 TABLET, FILM COATED ORAL DAILY
Refills: 0 | Status: DISCONTINUED | OUTPATIENT
Start: 2019-01-01 | End: 2019-01-01

## 2019-01-01 RX ORDER — CLOPIDOGREL BISULFATE 75 MG/1
1 TABLET, FILM COATED ORAL
Qty: 0 | Refills: 0 | DISCHARGE

## 2019-01-01 RX ORDER — HEPARIN SODIUM 5000 [USP'U]/ML
5000 INJECTION INTRAVENOUS; SUBCUTANEOUS ONCE
Refills: 0 | Status: COMPLETED | OUTPATIENT
Start: 2019-01-01 | End: 2019-01-01

## 2019-01-01 RX ORDER — CARVEDILOL PHOSPHATE 80 MG/1
6.25 CAPSULE, EXTENDED RELEASE ORAL EVERY 12 HOURS
Refills: 0 | Status: DISCONTINUED | OUTPATIENT
Start: 2019-01-01 | End: 2019-01-01

## 2019-01-01 RX ORDER — CEFAZOLIN SODIUM 1 G
1000 VIAL (EA) INJECTION EVERY 8 HOURS
Refills: 0 | Status: COMPLETED | OUTPATIENT
Start: 2019-01-01 | End: 2019-01-01

## 2019-01-01 RX ORDER — AMLODIPINE BESYLATE 2.5 MG/1
10 TABLET ORAL DAILY
Refills: 0 | Status: DISCONTINUED | OUTPATIENT
Start: 2019-01-01 | End: 2019-01-01

## 2019-01-01 RX ORDER — HEPARIN SODIUM 5000 [USP'U]/ML
5000 INJECTION INTRAVENOUS; SUBCUTANEOUS
Qty: 0 | Refills: 0 | DISCHARGE
Start: 2019-01-01

## 2019-01-01 RX ORDER — MORPHINE SULFATE 50 MG/1
2 CAPSULE, EXTENDED RELEASE ORAL THREE TIMES A DAY
Refills: 0 | Status: DISCONTINUED | OUTPATIENT
Start: 2019-01-01 | End: 2019-01-01

## 2019-01-01 RX ORDER — ACETAMINOPHEN 500 MG
2 TABLET ORAL
Qty: 0 | Refills: 0 | DISCHARGE
Start: 2019-01-01

## 2019-01-01 RX ORDER — ACETAMINOPHEN 500 MG
650 TABLET ORAL EVERY 6 HOURS
Refills: 0 | Status: DISCONTINUED | OUTPATIENT
Start: 2019-01-01 | End: 2019-01-01

## 2019-01-01 RX ORDER — PANTOPRAZOLE SODIUM 20 MG/1
1 TABLET, DELAYED RELEASE ORAL
Qty: 0 | Refills: 0 | DISCHARGE
Start: 2019-01-01

## 2019-01-01 RX ORDER — MORPHINE SULFATE 50 MG/1
2 CAPSULE, EXTENDED RELEASE ORAL ONCE
Qty: 0 | Refills: 0 | Status: DISCONTINUED | OUTPATIENT
Start: 2019-01-01 | End: 2019-01-01

## 2019-01-01 RX ORDER — MORPHINE SULFATE 50 MG/1
1 CAPSULE, EXTENDED RELEASE ORAL
Refills: 0 | Status: DISCONTINUED | OUTPATIENT
Start: 2019-01-01 | End: 2019-01-01

## 2019-01-01 RX ORDER — MORPHINE SULFATE 50 MG/1
2 CAPSULE, EXTENDED RELEASE ORAL
Refills: 0 | Status: DISCONTINUED | OUTPATIENT
Start: 2019-01-01 | End: 2019-01-01

## 2019-01-01 RX ADMIN — HEPARIN SODIUM 5000 UNIT(S): 5000 INJECTION INTRAVENOUS; SUBCUTANEOUS at 05:24

## 2019-01-01 RX ADMIN — AMLODIPINE BESYLATE 10 MILLIGRAM(S): 2.5 TABLET ORAL at 13:32

## 2019-01-01 RX ADMIN — Medication 650 MILLIGRAM(S): at 10:41

## 2019-01-01 RX ADMIN — AMLODIPINE BESYLATE 10 MILLIGRAM(S): 2.5 TABLET ORAL at 05:24

## 2019-01-01 RX ADMIN — Medication 81 MILLIGRAM(S): at 11:46

## 2019-01-01 RX ADMIN — Medication 1 PACKET(S): at 17:35

## 2019-01-01 RX ADMIN — HEPARIN SODIUM 5000 UNIT(S): 5000 INJECTION INTRAVENOUS; SUBCUTANEOUS at 15:25

## 2019-01-01 RX ADMIN — Medication 81 MILLIGRAM(S): at 18:31

## 2019-01-01 RX ADMIN — HEPARIN SODIUM 5000 UNIT(S): 5000 INJECTION INTRAVENOUS; SUBCUTANEOUS at 05:25

## 2019-01-01 RX ADMIN — SODIUM CHLORIDE 60 MILLILITER(S): 9 INJECTION, SOLUTION INTRAVENOUS at 17:17

## 2019-01-01 RX ADMIN — Medication 100 MILLIGRAM(S): at 21:17

## 2019-01-01 RX ADMIN — Medication 325 MILLIGRAM(S): at 11:45

## 2019-01-01 RX ADMIN — Medication 325 MILLIGRAM(S): at 12:08

## 2019-01-01 RX ADMIN — CILOSTAZOL 100 MILLIGRAM(S): 100 TABLET ORAL at 18:04

## 2019-01-01 RX ADMIN — Medication 650 MILLIGRAM(S): at 18:39

## 2019-01-01 RX ADMIN — ATORVASTATIN CALCIUM 20 MILLIGRAM(S): 80 TABLET, FILM COATED ORAL at 21:49

## 2019-01-01 RX ADMIN — ATORVASTATIN CALCIUM 20 MILLIGRAM(S): 80 TABLET, FILM COATED ORAL at 21:29

## 2019-01-01 RX ADMIN — ONDANSETRON 4 MILLIGRAM(S): 8 TABLET, FILM COATED ORAL at 18:25

## 2019-01-01 RX ADMIN — CARVEDILOL PHOSPHATE 6.25 MILLIGRAM(S): 80 CAPSULE, EXTENDED RELEASE ORAL at 05:51

## 2019-01-01 RX ADMIN — ATORVASTATIN CALCIUM 20 MILLIGRAM(S): 80 TABLET, FILM COATED ORAL at 21:18

## 2019-01-01 RX ADMIN — SODIUM CHLORIDE 50 MILLILITER(S): 9 INJECTION INTRAMUSCULAR; INTRAVENOUS; SUBCUTANEOUS at 22:32

## 2019-01-01 RX ADMIN — Medication 325 MILLIGRAM(S): at 11:08

## 2019-01-01 RX ADMIN — Medication 1 PACKET(S): at 05:52

## 2019-01-01 RX ADMIN — CHLORHEXIDINE GLUCONATE 1 APPLICATION(S): 213 SOLUTION TOPICAL at 05:53

## 2019-01-01 RX ADMIN — Medication 325 MILLIGRAM(S): at 11:46

## 2019-01-01 RX ADMIN — ATORVASTATIN CALCIUM 20 MILLIGRAM(S): 80 TABLET, FILM COATED ORAL at 21:08

## 2019-01-01 RX ADMIN — Medication 650 MILLIGRAM(S): at 09:26

## 2019-01-01 RX ADMIN — MORPHINE SULFATE 2 MILLIGRAM(S): 50 CAPSULE, EXTENDED RELEASE ORAL at 22:27

## 2019-01-01 RX ADMIN — Medication 1 PACKET(S): at 06:13

## 2019-01-01 RX ADMIN — ATORVASTATIN CALCIUM 20 MILLIGRAM(S): 80 TABLET, FILM COATED ORAL at 21:35

## 2019-01-01 RX ADMIN — AMLODIPINE BESYLATE 10 MILLIGRAM(S): 2.5 TABLET ORAL at 05:52

## 2019-01-01 RX ADMIN — HEPARIN SODIUM 5000 UNIT(S): 5000 INJECTION INTRAVENOUS; SUBCUTANEOUS at 14:20

## 2019-01-01 RX ADMIN — HEPARIN SODIUM 5000 UNIT(S): 5000 INJECTION INTRAVENOUS; SUBCUTANEOUS at 21:15

## 2019-01-01 RX ADMIN — Medication 100 MILLIGRAM(S): at 05:40

## 2019-01-01 RX ADMIN — SODIUM CHLORIDE 75 MILLILITER(S): 9 INJECTION INTRAMUSCULAR; INTRAVENOUS; SUBCUTANEOUS at 05:34

## 2019-01-01 RX ADMIN — AMLODIPINE BESYLATE 10 MILLIGRAM(S): 2.5 TABLET ORAL at 05:25

## 2019-01-01 RX ADMIN — PANTOPRAZOLE SODIUM 40 MILLIGRAM(S): 20 TABLET, DELAYED RELEASE ORAL at 05:55

## 2019-01-01 RX ADMIN — CARVEDILOL PHOSPHATE 6.25 MILLIGRAM(S): 80 CAPSULE, EXTENDED RELEASE ORAL at 17:35

## 2019-01-01 RX ADMIN — AMLODIPINE BESYLATE 10 MILLIGRAM(S): 2.5 TABLET ORAL at 06:13

## 2019-01-01 RX ADMIN — Medication 650 MILLIGRAM(S): at 11:21

## 2019-01-01 RX ADMIN — Medication 1 PACKET(S): at 17:41

## 2019-01-01 RX ADMIN — PANTOPRAZOLE SODIUM 40 MILLIGRAM(S): 20 TABLET, DELAYED RELEASE ORAL at 11:28

## 2019-01-01 RX ADMIN — AMLODIPINE BESYLATE 10 MILLIGRAM(S): 2.5 TABLET ORAL at 05:40

## 2019-01-01 RX ADMIN — HEPARIN SODIUM 5000 UNIT(S): 5000 INJECTION INTRAVENOUS; SUBCUTANEOUS at 13:37

## 2019-01-01 RX ADMIN — AMLODIPINE BESYLATE 10 MILLIGRAM(S): 2.5 TABLET ORAL at 05:55

## 2019-01-01 RX ADMIN — HEPARIN SODIUM 5000 UNIT(S): 5000 INJECTION INTRAVENOUS; SUBCUTANEOUS at 21:50

## 2019-01-01 RX ADMIN — Medication 81 MILLIGRAM(S): at 11:08

## 2019-01-01 RX ADMIN — CARVEDILOL PHOSPHATE 6.25 MILLIGRAM(S): 80 CAPSULE, EXTENDED RELEASE ORAL at 05:55

## 2019-01-01 RX ADMIN — HEPARIN SODIUM 5000 UNIT(S): 5000 INJECTION INTRAVENOUS; SUBCUTANEOUS at 05:40

## 2019-01-01 RX ADMIN — HEPARIN SODIUM 5000 UNIT(S): 5000 INJECTION INTRAVENOUS; SUBCUTANEOUS at 21:18

## 2019-01-01 RX ADMIN — HEPARIN SODIUM 5000 UNIT(S): 5000 INJECTION INTRAVENOUS; SUBCUTANEOUS at 05:14

## 2019-01-01 RX ADMIN — SODIUM CHLORIDE 50 MILLILITER(S): 9 INJECTION INTRAMUSCULAR; INTRAVENOUS; SUBCUTANEOUS at 05:24

## 2019-01-01 RX ADMIN — Medication 1 PACKET(S): at 18:36

## 2019-01-01 RX ADMIN — ATORVASTATIN CALCIUM 20 MILLIGRAM(S): 80 TABLET, FILM COATED ORAL at 21:15

## 2019-01-01 RX ADMIN — Medication 200 MILLIGRAM(S): at 21:54

## 2019-01-01 RX ADMIN — Medication 81 MILLIGRAM(S): at 14:30

## 2019-01-01 RX ADMIN — ATORVASTATIN CALCIUM 20 MILLIGRAM(S): 80 TABLET, FILM COATED ORAL at 21:39

## 2019-01-01 RX ADMIN — HEPARIN SODIUM 5000 UNIT(S): 5000 INJECTION INTRAVENOUS; SUBCUTANEOUS at 05:30

## 2019-01-01 RX ADMIN — Medication 325 MILLIGRAM(S): at 11:28

## 2019-01-01 RX ADMIN — HEPARIN SODIUM 5000 UNIT(S): 5000 INJECTION INTRAVENOUS; SUBCUTANEOUS at 01:03

## 2019-01-01 RX ADMIN — HEPARIN SODIUM 5000 UNIT(S): 5000 INJECTION INTRAVENOUS; SUBCUTANEOUS at 21:35

## 2019-01-01 RX ADMIN — SODIUM CHLORIDE 75 MILLILITER(S): 9 INJECTION INTRAMUSCULAR; INTRAVENOUS; SUBCUTANEOUS at 06:14

## 2019-01-01 RX ADMIN — PANTOPRAZOLE SODIUM 40 MILLIGRAM(S): 20 TABLET, DELAYED RELEASE ORAL at 06:13

## 2019-01-01 RX ADMIN — MORPHINE SULFATE 2 MILLIGRAM(S): 50 CAPSULE, EXTENDED RELEASE ORAL at 06:16

## 2019-01-01 RX ADMIN — Medication 1 PACKET(S): at 05:55

## 2019-01-01 RX ADMIN — CARVEDILOL PHOSPHATE 6.25 MILLIGRAM(S): 80 CAPSULE, EXTENDED RELEASE ORAL at 18:36

## 2019-01-01 RX ADMIN — HEPARIN SODIUM 5000 UNIT(S): 5000 INJECTION INTRAVENOUS; SUBCUTANEOUS at 13:44

## 2019-01-01 RX ADMIN — Medication 1 PACKET(S): at 16:11

## 2019-01-01 RX ADMIN — CILOSTAZOL 100 MILLIGRAM(S): 100 TABLET ORAL at 05:30

## 2019-01-01 RX ADMIN — AMLODIPINE BESYLATE 10 MILLIGRAM(S): 2.5 TABLET ORAL at 05:31

## 2019-01-01 RX ADMIN — HEPARIN SODIUM 5000 UNIT(S): 5000 INJECTION INTRAVENOUS; SUBCUTANEOUS at 21:54

## 2019-01-01 RX ADMIN — CARVEDILOL PHOSPHATE 6.25 MILLIGRAM(S): 80 CAPSULE, EXTENDED RELEASE ORAL at 06:13

## 2019-01-01 RX ADMIN — Medication 650 MILLIGRAM(S): at 08:50

## 2019-01-01 RX ADMIN — Medication 81 MILLIGRAM(S): at 12:08

## 2019-01-01 RX ADMIN — MORPHINE SULFATE 2 MILLIGRAM(S): 50 CAPSULE, EXTENDED RELEASE ORAL at 15:24

## 2019-01-01 RX ADMIN — HEPARIN SODIUM 5000 UNIT(S): 5000 INJECTION INTRAVENOUS; SUBCUTANEOUS at 06:16

## 2019-01-01 RX ADMIN — CARVEDILOL PHOSPHATE 6.25 MILLIGRAM(S): 80 CAPSULE, EXTENDED RELEASE ORAL at 17:41

## 2019-01-01 RX ADMIN — CARVEDILOL PHOSPHATE 6.25 MILLIGRAM(S): 80 CAPSULE, EXTENDED RELEASE ORAL at 05:31

## 2019-01-01 RX ADMIN — CARVEDILOL PHOSPHATE 6.25 MILLIGRAM(S): 80 CAPSULE, EXTENDED RELEASE ORAL at 18:04

## 2019-01-01 RX ADMIN — Medication 81 MILLIGRAM(S): at 13:37

## 2019-01-01 RX ADMIN — CLOPIDOGREL BISULFATE 75 MILLIGRAM(S): 75 TABLET, FILM COATED ORAL at 13:37

## 2019-01-01 RX ADMIN — CHLORHEXIDINE GLUCONATE 1 APPLICATION(S): 213 SOLUTION TOPICAL at 05:56

## 2019-01-01 RX ADMIN — CHLORHEXIDINE GLUCONATE 1 APPLICATION(S): 213 SOLUTION TOPICAL at 06:15

## 2019-02-05 PROBLEM — Z00.00 ENCOUNTER FOR PREVENTIVE HEALTH EXAMINATION: Status: ACTIVE | Noted: 2019-02-05

## 2019-02-12 ENCOUNTER — APPOINTMENT (OUTPATIENT)
Dept: VASCULAR SURGERY | Facility: CLINIC | Age: 84
End: 2019-02-12
Payer: MEDICARE

## 2019-02-12 VITALS
WEIGHT: 130 LBS | SYSTOLIC BLOOD PRESSURE: 140 MMHG | HEIGHT: 60 IN | DIASTOLIC BLOOD PRESSURE: 70 MMHG | BODY MASS INDEX: 25.52 KG/M2

## 2019-02-12 DIAGNOSIS — M79.89 OTHER SPECIFIED SOFT TISSUE DISORDERS: ICD-10-CM

## 2019-02-12 DIAGNOSIS — I70.213 ATHEROSCLEROSIS OF NATIVE ARTERIES OF EXTREMITIES WITH INTERMITTENT CLAUDICATION, BILATERAL LEGS: ICD-10-CM

## 2019-02-12 DIAGNOSIS — I10 ESSENTIAL (PRIMARY) HYPERTENSION: ICD-10-CM

## 2019-02-12 DIAGNOSIS — E78.00 PURE HYPERCHOLESTEROLEMIA, UNSPECIFIED: ICD-10-CM

## 2019-02-12 DIAGNOSIS — I65.23 OCCLUSION AND STENOSIS OF BILATERAL CAROTID ARTERIES: ICD-10-CM

## 2019-02-12 DIAGNOSIS — N28.9 DISORDER OF KIDNEY AND URETER, UNSPECIFIED: ICD-10-CM

## 2019-02-12 DIAGNOSIS — I87.2 VENOUS INSUFFICIENCY (CHRONIC) (PERIPHERAL): ICD-10-CM

## 2019-02-12 PROCEDURE — 99203 OFFICE O/P NEW LOW 30 MIN: CPT

## 2019-02-12 RX ORDER — LATANOPROST/PF 0.005 %
0.01 DROPS OPHTHALMIC (EYE)
Refills: 0 | Status: ACTIVE | COMMUNITY

## 2019-02-12 RX ORDER — CLOPIDOGREL BISULFATE 300 MG/1
TABLET, FILM COATED ORAL
Refills: 0 | Status: ACTIVE | COMMUNITY

## 2019-02-12 RX ORDER — CILOSTAZOL 100 MG/1
TABLET ORAL
Refills: 0 | Status: ACTIVE | COMMUNITY

## 2019-02-12 RX ORDER — ATORVASTATIN CALCIUM 20 MG/1
20 TABLET, FILM COATED ORAL
Refills: 0 | Status: ACTIVE | COMMUNITY

## 2019-02-12 RX ORDER — ENALAPRIL MALEATE 10 MG/1
10 TABLET ORAL
Refills: 0 | Status: ACTIVE | COMMUNITY

## 2019-02-12 RX ORDER — AMLODIPINE BESYLATE 10 MG/1
10 TABLET ORAL
Refills: 0 | Status: ACTIVE | COMMUNITY

## 2019-02-12 RX ORDER — TRIAMTERENE AND HYDROCHLOROTHIAZIDE 37.5; 25 MG/1; MG/1
37.5-25 CAPSULE ORAL
Refills: 0 | Status: ACTIVE | COMMUNITY

## 2019-02-12 RX ORDER — MEMANTINE HYDROCHLORIDE 10 MG/1
10 TABLET ORAL
Refills: 0 | Status: ACTIVE | COMMUNITY

## 2019-02-12 NOTE — ASSESSMENT
[FreeTextEntry1] : 89 y/o female with h/o PVD, underwent right SFA angioplasty and stent in 2010 at [facility], c/o dyspnea on ambulation at half a block. She c/o leg swelling and venous ulcerations, denies any h/o DVT. She was admitted for leg swelling to [facility] one month ago and had arterial and venous duplex that showed right SFA occlusion, left SFA stenosis and popliteal artery occlusion, and no evidence of DVT. She is asymptomatic from her arterial disease as she is currently without any ulcerations, ischemic rest pain or debilitating claudication, and so no vascular surgical intervention is needed at this time. I recommend compression therapy to improve the edema and prevent ulcerations, and use of emollient daily. She can follow up as needed.

## 2019-02-12 NOTE — HISTORY OF PRESENT ILLNESS
[FreeTextEntry1] : 89 y/o female with h/o PVD, underwent right SFA angioplasty and stent in 2010 at [facility], c/o dyspnea on ambulation at half a block. She c/o leg swelling and venous ulcerations, denies any h/o DVT. She was admitted for leg swelling to [facility] one month ago and had arterial and venous duplex that showed right SFA occlusion, left SFA stenosis and popliteal artery occlusion, and no evidence of DVT.

## 2019-03-11 ENCOUNTER — APPOINTMENT (OUTPATIENT)
Dept: VASCULAR SURGERY | Facility: CLINIC | Age: 84
End: 2019-03-11
Payer: MEDICARE

## 2019-03-11 PROCEDURE — 29580 STRAPPING UNNA BOOT: CPT | Mod: LT

## 2019-03-11 NOTE — ASSESSMENT
[FreeTextEntry1] : this is a 89 year old with history of venous insufficiency and PVD. He has venous uklcerations of her left leg. There does not appear to be an arterial component. I recommend starting a left leg unna boot for 1 week and continue with right 20-30 mmhg compression stocking therapy. Follow up in 1 week for wound evaluation.

## 2019-03-11 NOTE — CONSULT LETTER
[Dear  ___] : Dear  [unfilled], [Please see my note below.] : Please see my note below. [Courtesy Letter:] : I had the pleasure of seeing your patient, [unfilled], in my office today.

## 2019-03-11 NOTE — HISTORY OF PRESENT ILLNESS
[FreeTextEntry1] : 89 y/o female with h/o PVD, underwent right SFA angioplasty and stent in 2010 at [facility], c/o dyspnea on ambulation at half a block. She c/o leg swelling and venous ulcerations, denies any h/o DVT. She was admitted for leg swelling to [facility] 3 months ago and had arterial and venous duplex that showed right SFA occlusion, left SFA stenosis and popliteal artery occlusion, and no evidence of DVT. The patient was seen by my partner 1 month ago and recommended compression stocking therapy.\par \par Today the patient presents with left leg new open wounds to the medial aspect of her right leg.

## 2019-03-18 ENCOUNTER — APPOINTMENT (OUTPATIENT)
Dept: VASCULAR SURGERY | Facility: CLINIC | Age: 84
End: 2019-03-18
Payer: MEDICARE

## 2019-03-18 PROCEDURE — 29580 STRAPPING UNNA BOOT: CPT | Mod: LT

## 2019-03-18 NOTE — HISTORY OF PRESENT ILLNESS
[FreeTextEntry1] : 89 y/o female with left medial malleolus ulcer on unna boot therapy, presents for followup, denies any complaints.

## 2019-03-18 NOTE — ASSESSMENT
[FreeTextEntry1] : 89 y/o female with left medial malleolus ulcer on unna boot therapy, presents for followup, denies any complaints. The leg swelling and wound has almost healed. I recommend one more week of unna boot to the left lower extremity. I will follow up in one weeks time.

## 2019-03-25 ENCOUNTER — APPOINTMENT (OUTPATIENT)
Dept: VASCULAR SURGERY | Facility: CLINIC | Age: 84
End: 2019-03-25
Payer: MEDICARE

## 2019-03-25 DIAGNOSIS — I83.029 VARICOSE VEINS OF LEFT LOWER EXTREMITY WITH ULCER OF UNSPECIFIED SITE: ICD-10-CM

## 2019-03-25 DIAGNOSIS — L97.929 VARICOSE VEINS OF LEFT LOWER EXTREMITY WITH ULCER OF UNSPECIFIED SITE: ICD-10-CM

## 2019-03-25 PROCEDURE — 99212 OFFICE O/P EST SF 10 MIN: CPT

## 2019-03-25 NOTE — ASSESSMENT
[FreeTextEntry1] : 89 y/o female with left medial malleolus ulcer on unna boot therapy, presents for followup, denies any complaints. The leg swelling and wound has healed well. I recommend use of emollient daily and compression stockings. She can follow up as needed.

## 2019-03-25 NOTE — HISTORY OF PRESENT ILLNESS
[FreeTextEntry1] : 89 y/o female with left medial malleolus ulcer on unna boot therapy, presents for followup, denies any complaints.

## 2019-05-08 NOTE — ED PROVIDER NOTE - PHYSICAL EXAMINATION
CONST: Well appearing in NAD  HEAD: NC/AT  EYES: Sclera and conjunctiva clear.  NECK: No midline C/T/L tenderness.   CARD: Normal S1 S2; Normal rate and rhythm  RESP: Equal BS B/L, No wheezes, rhonchi or rales. No distress  GI: Soft, non-tender, non-distended.  MS: + shortened and rotated LLE, + TTP of left latera hip and thigh, left pedal pulse present on doppler, sensation intact of extremity, No edema of lower extremities, no calf pain, radial pulses 2+ bilaterally  SKIN: Warm, dry, no acute rashes. Good turgor  NEURO: A&Ox3, No focal deficits. Strength 5/5 with no sensory deficits, left leg NVI intact

## 2019-05-08 NOTE — ED ADULT NURSE NOTE - PRIMARY CARE PROVIDER
8/9/2018       RE: Phan Luque  [address] [facility] [address]     Dear Colleague,    Thank you for referring your patient, Phan Luque, to the [facility] EMG at [facility]. Please see a copy of my visit note below.        [facility]  Electrodiagnostic Laboratory    Nerve Conduction & EMG Report          Patient:       Phan Luque  Patient ID:    [id number]  Gender:        Female  YOB: 1956  Age:           61 Years 9 Months        History & Examination: Ms. Dean is a 61-year-old female with leg weakness and numbness in the feet.  She is sent for evaluation for lumbar radiculopathy or peripheral neuropathy.  Evaluation of both legs was requested.      Techniques: Motor conduction studies were done with surface recording electrodes. Sensory conduction studies were performed with surface electrodes, unless indicated otherwise by (n), designating the use of subdermal recording electrodes. Temperature was monitored and recorded throughout the study. Upper extremities were maintained at a temperature of 32 degrees Centigrade or higher.  Lower extremities were maintained at a temperature of 31degrees Centigrade or higher. EMG was done with a concentric needle electrode.        Results: The left sural sensory nerve action potential was minimally reduced in amplitude but this may have been  technical in nature.  All other sensory nerve action potentials in the legs were normal.  Motor nerve conduction studies in the legs were normal.  All compound motor unit action potential amplitudes were normal and conduction velocities and distal latencies were normal.  Needle exam revealed long duration motor unit potentials in distal leg muscles with minimal fibrillation in the right gastrocnemius.  Proximal leg muscles were normal.      Interpretation: The EMG is abnormal.  The EMG abnormalities would be most consistent with a mild distal  motor-predominant peripheral neuropathy.      EMG Physician: Shaun Sandoval MD         Sensory NCS      Nerve / Sites Rec. Site Onset Peak NP Amp Ref. PP Amp Dist Louis Ref. Temp     ms ms  V  V  V cm m/s m/s  C   R SURAL - Lat Mall 60      Calf Ankle 2.55 3.75 7.3 5.0 4.7 13 50.9 38.0 31.7   L SURAL - Lat Mall 60      Calf Ankle 3.13 4.01 4.4 5.0 5.2 14 44.8 38.0 32      Calf Ankle 3.18 3.96 4.3 5.0 5.4 14 44.1  32      Calf Ankle 3.28 4.11 4.5 5.0 4.7 14 42.7  32   R SUP PERONEAL      Lat Leg Heath 3.07 3.80 4.0  4.0 12.5 40.7 38.0 31.9   L SUP PERONEAL      Lat Leg Heath 2.71 3.39 3.7  3.8 12.5 46.2 38.0 31.9       Motor NCS      Nerve / Sites Rec. Site Lat Ref. Amp Ref. Rel Amp Dist Louis Ref. Dur. Area Temp.     ms ms mV mV % cm m/s m/s ms %  C   L DEEP PERONEAL - EDB      Ankle EDB 3.44 6.00 4.2 2.5 100 8   5.68 100 31.9   R DEEP PERONEAL - EDB 60      Ankle EDB 3.39 6.00 6.7 2.0 100 8   5.78 100 32.2      FibHead EDB 12.19  6.6  97.6 34 38.6 38.0 6.09 107 32.2      Pop Fos EDB 14.06  6.1  90.2 9 48.0 38.0 5.78 102 32.2   R TIBIAL - AH      Ankle AH 3.65 6.00 8.0 4.0 100 8   6.51 100 32.1      Pop Fos AH 13.18  6.7  83.3 39 40.9 38.0 7.55 90 32.1   L TIBIAL - AH      Ankle AH 3.44 6.00 7.2 4.0 100 8   7.24 100 31.9       F  Wave      Nerve Min F Lat Max F Lat Mean FLat Temp.    ms ms ms  C   R TIBIAL 49.69 64.53 60.18 32.1       Needle EMG (W)      EMG Summary Table     Spontaneous MUAP Recruitment    IA Fib PSW Fasc H.F. Amp Dur. PPP Pattern   R. TIB ANTERIOR N None None None None N 1+ 1+ N   R. GASTROCN (MED) Increased 1+ None None None 1+ 1+ 1+ N   R. TIB POSTERIOR N None None None None 1+ 1+ N N   R. VAST LATERALIS N None None None None N N N N   R. T FASCIA MANN N None None None None N N N N   L. TIB ANTERIOR N None None None None 1+ 1+ N N   L. GASTROCN (MED) N None None None None 1+ 1+ N N   R. GLUTEUS MAX N None None None None N N N N                              Again, thank you for allowing me to  participate in the care of your patient.      Sincerely,    Shaun Sandoval MD       Unknown

## 2019-05-08 NOTE — ED PROVIDER NOTE - CLINICAL SUMMARY MEDICAL DECISION MAKING FREE TEXT BOX
I personally evaluated the patient. I reviewed the Resident’s or Physician Assistant’s note (as assigned above), and agree with the findings and plan except as documented in my note. Evaluated by ortho, will admit to medicine, will be pr-oped for surgical repair

## 2019-05-08 NOTE — ED PROVIDER NOTE - ATTENDING CONTRIBUTION TO CARE
89 year old female, pmhx as stated in the chart, here s/p mechanical trip and fall, + left side hip and femur pain, no head injury, no loc, no n/v/d. Patient evaluated by ortho, patient will be admitted for surgical repair, + strong ipsilateral dp an pt pulse on admission.     CONSTITUTIONAL: Well-developed; well-nourished; in no acute distress. Sitting up and providing appropriate history and physical examination  TRAUMA: Primary and Secondary surveys intact, GCS 15, no midline CTLS spine tenderness, Pelvis stable, + unable to move Left Lower extremity, + dopplerable left dp pulse, + tenderness to the left mid shaft femur and left hip  SKIN: skin exam is warm and dry, no acute rash.  HEAD: Normocephalic; atraumatic.  EYES: PERRL, 3 mm bilateral, no nystagmus, EOM intact; conjunctiva and sclera clear.  ENT: No nasal discharge; airway clear.  NECK: Supple; non tender. + full passive ROM in all directions. No JVD  CARD: S1, S2 normal; no murmurs, gallops, or rubs. Regular rate and rhythm. + Symmetric Strong Pulses  RESP: No wheezes, rales or rhonchi. Good air movement bilaterally  ABD: soft; non-distended; non-tender. No Rebound, No Guarding, No signs of peritonitis, No CVA tenderness. No pulsatile abdominal mass. + Strong and Symmetric Pulses  EXT: See above, otherwise, Normal ROM. No clubbing, cyanosis or edema. Dp and Pt Pulses intact. Cap refill less than 3 seconds  NEURO: Alert, oriented, grossly unremarkable. No Focal deficits. GCS 15. NIH 0  PSYCH: Cooperative, appropriate.

## 2019-05-08 NOTE — ED ADULT NURSE REASSESSMENT NOTE - NS ED NURSE REASSESS COMMENT FT1
Numerous attempts to insert IV hep loc.   2215: ER Resident placed # [id number] in left arm via ultrasound. Iv patent and flushing without difficulty.

## 2019-05-08 NOTE — ED PROVIDER NOTE - OBJECTIVE STATEMENT
89 y.o female w/ hx of CKD 4, HTN, cad x 4 stents presents to the ED for evaluation of mechanical trip and fall. walking across street, tripped, fell w/ no head injury or LOC.  Developed acute left femur pain, sharp, stabbing, intermittent, worse w/ ROM of hip, alleviated with rest.  Denies upper extremity pain, chest pain, dyspnea, neck pain, back pain, N/V/D, headache, paresthesias.

## 2019-05-08 NOTE — ED PROVIDER NOTE - NS ED ROS FT
Constitutional: See HPI.  Eyes: No visual changes, eye pain or discharge.   ENMT: No hearing changes, pain, discharge or infections. No neck pain or stiffness. No limited ROM  Cardiac: No SOB or edema. No chest pain with exertion.  Respiratory: No cough or respiratory distress.   GI: No nausea, vomiting, diarrhea or abdominal pain.  : No dysuria, frequency or burning. No Discharge  MS: + left leg pain. No myalgia, muscle weakness, or back pain.  Neuro: No headache or weakness. No LOC.  Skin: No skin rash.  Except as documented in the HPI, all other systems are negative.

## 2019-05-08 NOTE — ED ADULT NURSE NOTE - NSIMPLEMENTINTERV_GEN_ALL_ED
Implemented All Fall with Harm Risk Interventions:  [place] to call system. Call bell, personal items and telephone within reach. Instruct patient to call for assistance. Room bathroom lighting operational. Non-slip footwear when patient is off stretcher. Physically safe environment: no spills, clutter or unnecessary equipment. Stretcher in lowest position, wheels locked, appropriate side rails in place. Provide visual cue, wrist band, yellow gown, etc. Monitor gait and stability. Monitor for mental status changes and reorient to person, place, and time. Review medications for side effects contributing to fall risk. Reinforce activity limits and safety measures with patient and family. Provide visual clues: red socks.

## 2019-05-08 NOTE — ED ADULT TRIAGE NOTE - CHIEF COMPLAINT QUOTE
BIBA with complaints tripped and fell with left hip pain/deformity, did not hit head, no LOC, no blood thinners

## 2019-05-08 NOTE — ED ADULT NURSE NOTE - CAS EDP DISCH DISPOSITION ADMI
4B 18B/[facility] To be transferred from CT scan to [address] [place] [address]

## 2019-05-09 NOTE — H&P ADULT - NSICDXPASTMEDICALHX_GEN_ALL_CORE_FT
PAST MEDICAL HISTORY:  CAD (coronary artery disease)     Chronic kidney disease (CKD)     PVD (peripheral vascular disease)

## 2019-05-09 NOTE — H&P ADULT - ASSESSMENT
89 yo F with PMH of CAD, PVD comes to the ED for the hip fracture.     # L Subtrochanteric comminuted fracture    -Pan control  - CT hip  - Medical clearance for the surgery (Consider cardiology clearance as well as patient has loud systolic murmur and h/o CAD)     # CAD  - Start patient on home medications    # Dispo: Home    # DVP ppx: Heparin sub q Hold before the surgery 89 yo F with PMH of CAD, PVD comes to the ED for the hip fracture.     # L Subtrochanteric comminuted fracture   -Pain control  - CT hip  - Medical clearance for the surgery (Consider cardiology clearance as well as patient has loud systolic murmur and h/o CAD)   - ECHO   - NPO after midnight  - Ortho follow up    # CAD  - Start patient on home medications    # Anemia  - Keep 2 units for hold for surgery  - Iron studies     # Dispo: Home    # DVP ppx: Heparin sub q Hold before the surgery    # Dispo: Home Lives alone, Daughter is a healthcare proxy  # Please verify meds in AM 89 yo F with PMH of CAD, PVD, CKD comes to the ED for the hip fracture.     # L Subtrochanteric comminuted fracture   -Pain control  - CT hip  - Medical clearance for the surgery (Consider cardiology clearance as well as patient has loud systolic murmur and h/o CAD)   - ECHO   - NPO after midnight  - Ortho follow up    # CAD  - Start patient on home medications    # Anemia  - Keep 2 units for hold for surgery  - Iron studies     # CKD 4   - Stable    # Dispo: Home    # DVP ppx: Heparin sub q Hold before the surgery    # Dispo: Home Lives alone, Daughter is a healthcare proxy  # Please verify meds in AM

## 2019-05-09 NOTE — PROVIDER CONTACT NOTE (OTHER) - ACTION/TREATMENT ORDERED:
As per MD Hart, she will review and follow up.
As per MD Chun, surgical fixation will be discussed team in morning of 5/10/19

## 2019-05-09 NOTE — PROVIDER CONTACT NOTE (OTHER) - BACKGROUND
Pt pending surgical fixation secondary to (L) hip fx. Pt's daughter, Bridgette is requesting that only MD Coughlin perform surgery.

## 2019-05-09 NOTE — CONSULT NOTE ADULT - ASSESSMENT
CAD  moderate aortic stenosis  Pre-op for hip replacement    Pre op risk stratification  - No active MI, No active CHF, No Active malignant arrhythmia  - patient is capable of performing at a level of at least 4 METS  - has mild exertional dyspnea on incline but has been baseline for some time  - Revised cardiac risk index: (CAD) 1  - given history of CAD, patient should be on aspirin and statin but no benefit to start marisa-operatively  - EKG and echo reviewed  - given above, patient is considered an intermediate  risk patient for lintermediate risk procedure CAD  moderate aortic stenosis  Pre-op for hip replacement    Pre op risk stratification  - No active MI, No active CHF, No Active malignant arrhythmia  - patient is capable of performing at a level of at least 4 METS  - has mild exertional dyspnea on incline but has been baseline for some time  - Revised cardiac risk index: (CAD) 1  - given history of CAD, patient should be on aspirin and statin  - EKG and echo reviewed  - given above, patient is considered an intermediate  risk patient for lintermediate risk procedure, hemodyn monitoring, avoid fluid overload

## 2019-05-09 NOTE — CONSULT NOTE ADULT - ASSESSMENT
89F presents to ED with L hip pain after mechanical fall from standing height on the street while walking home with a shopping cart few hours earlier. Pt was unable to bear weight after fall. Denies head injury, LOC, or injury to any other part of her body. Denies n/t in LLE. At base line pt is able to walk several blocks w/o any assistive device.      PMH: HTN, CKD, CAD  PSH: Cholecystectomy, cardiac stents x 4, L popliteal stent, (unknown) air x 2, tonsillectomy  Meds: Multiple meds, does not remember names  NKDA  Sx: (-) x 2, occasional EtOH    Vital Signs Last 24 Hrs  T(C): 36.5 (08 May 2019 20:05), Max: 36.5 (08 May 2019 20:05)  T(F): 97.7 (08 May 2019 20:05), Max: 97.7 (08 May 2019 20:05)  HR: 72 (08 May 2019 20:05) (72 - 72)  BP: 168/83 (08 May 2019 20:05) (168/83 - 168/83)  BP(mean): --  RR: 20 (08 May 2019 20:05) (20 - 20)  SpO2: 96% (08 May 2019 20:05) (96% - 96%)      NAD, AAO x 3, unlabored breathing  LLE  Lhip kept in approximately 20 deg flexion and external rotation  no gross deformity  skin intact, no erythema, no ecchymosis  diffusely TTP about the entire hip area  pain on log roll  pain on axial loading  unable to arom/prom 2/2 pain  SILT distally  DP b/l not palpable, which is a preexisting condition  toes wwp x 5, cap ref wnl  compartments soft and compressible    XR: L subtroch fx    89F with L subtroch fx. Discussed with pt R/B/A of surgical fixation. Pt verbalized understanding, agreed and gave written consent for surgery. Pt added on for OR on 5/9/19.  -Admit  -Pan control  -NPO at midnight  -preop labs  -T&S x 2  -trend h/h  -Medicine clearance, including risk stratification and medical optimization  -Anesthesia evaluation  -CXR  -EKG  -2 units of PRBC on hold for OR in AM  -HSQ x 1  -Hold DVT ppx in AM  -Gentle IVF as per primary team

## 2019-05-09 NOTE — PROVIDER CONTACT NOTE (OTHER) - ASSESSMENT
As per family, if MD Coughlin is unable to perform surgery, please consult with them regarding plan of care.

## 2019-05-09 NOTE — H&P ADULT - ATTENDING COMMENTS
HPI as above.  Vital Signs (24 Hrs):  T(C): 36.2 (05-09-19 @ 07:40), Max: 36.5 (05-08-19 @ 20:05)  HR: 75 (05-09-19 @ 07:40) (72 - 78)  BP: 154/71 (05-09-19 @ 07:40) (154/71 - 193/81)  RR: 18 (05-09-19 @ 07:40) (18 - 20)  SpO2: 95% (05-09-19 @ 06:15) (95% - 96%)  Wt(kg): --  Daily Height in cm: 152.4 (08 May 2019 20:05)    Daily     I&O's Summary    Exam: PHYSICAL EXAM:  GENERAL: NAD, well-developed  HEAD:  Atraumatic, Normocephalic  EYES: EOMI, PERRLA, conjunctiva and sclera clear  NECK: Supple, No JVD  CHEST/LUNG: Clear to auscultation bilaterally; No wheeze  HEART: Regular rate and rhythm; +murmur  ABDOMEN: Soft, Nontender, Nondistended; Bowel sounds present  EXTREMITIES:  tenderness in left hip no edema  PSYCH: AAOx3  NEUROLOGY: non-focal  SKIN: No rashes or lesions    Labs reviewed  Imaging reviewed < from: Xray Femur 2 Views, Left (05.08.19 @ 21:54) >    Acute comminuted left femur subtrochanteric fracture with extension into   the lesser trochanter.  EKG reviewed< from: 12 Lead ECG (05.08.19 @ 21:12) >    Diagnosis Line Normal sinus rhythm  Normal ECG    < end of copied text >    Plan  #Acute comminuted left femur subtrochanteric fracture with extension into the lesser trochanter  - pt for or today  - medically cleared from medicine  - will need Cardio clearance given murmur on exam and cad hx     #Murmur on exam- pending echo    rest as above  #Progress Note Handoff  Pending (specify):  Consults_________, Tests________, Test Results_______, Other_________  Family discussion:  Disposition: Home___/SNF___/Other________/Unknown at this time________ HPI as above.  Vital Signs (24 Hrs):  T(C): 36.2 (05-09-19 @ 07:40), Max: 36.5 (05-08-19 @ 20:05)  HR: 75 (05-09-19 @ 07:40) (72 - 78)  BP: 154/71 (05-09-19 @ 07:40) (154/71 - 193/81)  RR: 18 (05-09-19 @ 07:40) (18 - 20)  SpO2: 95% (05-09-19 @ 06:15) (95% - 96%)  Wt(kg): --  Daily Height in cm: 152.4 (08 May 2019 20:05)    Daily     I&O's Summary    Exam: PHYSICAL EXAM:  GENERAL: NAD, well-developed  HEAD:  Atraumatic, Normocephalic  EYES: EOMI, PERRLA, conjunctiva and sclera clear  NECK: Supple, No JVD  CHEST/LUNG: Clear to auscultation bilaterally; No wheeze  HEART: Regular rate and rhythm; +murmur  ABDOMEN: Soft, Nontender, Nondistended; Bowel sounds present  EXTREMITIES:  tenderness in left hip no edema  PSYCH: AAOx3  NEUROLOGY: non-focal  SKIN: No rashes or lesions    Labs reviewed  Imaging reviewed < from: Xray Femur 2 Views, Left (05.08.19 @ 21:54) >    Acute comminuted left femur subtrochanteric fracture with extension into   the lesser trochanter.  EKG reviewed< from: 12 Lead ECG (05.08.19 @ 21:12) >    Diagnosis Line Normal sinus rhythm  Normal ECG    < end of copied text >    Plan  #Acute comminuted left femur subtrochanteric fracture with extension into the lesser trochanter  - pt for or today  - medically cleared from medicine  - will need Cardio clearance given murmur on exam and cad hx   -PT after sx   -preop note placed    #Murmur on exam- pending echo    #rest as above    rest as above  #Progress Note Handoff  Pending (specify):  Consults____Cardio for clearance, Ortho_____, Tests________, Test Results__ECHO_____, Other_________  Family discussion: dw pt at bedside and agreed to plan   Disposition: Home___/SNF___/Other________/Unknown at this time___x_____

## 2019-05-09 NOTE — CHART NOTE - NSCHARTNOTEFT_GEN_A_CORE
Planned Procedure____ORIF left hip_________________________________    Does the patient have the following conditions? Type Y or N    __N__DVT/PE           	  Currently anticoagulated ______ IVC Filter _______ Date:______    __N__DM                             Controlled    Y _______ N _______    __N__HTN	                              Controlled    Y _______ N _______    __Y__CAD	                                  Prior MI  _____CABG _____STENT  __2010____ EF _____ CARDIO CONSULT PLACED FRO CLEARANCE     ____CHF                             Chronic _____ Acute _____ EF ______CARDIO CONSULT PLACED FRO CLEARANCE     ____Afib	                                  Current Rhythm _________   Current anticoagulation _________CARDIO CONSULT PLACED FRO CLEARANCE     ____PPM/ICD                         Indication ___________  last Interrogated _________CARDIO CONSULT PLACED FRO CLEARANCE     __N__OSA	                              PAP  Type &Settings _____________    __N__Asthma/COPD	          Last Exac _______ Last Steroid use Date _______     __N__O2 dependent	          Reason ______________    __Y__CKD or MARILU	                  Stable Y ___X__  N ______    __N__Electrolyte Abn	          Type ___________     Stable Y ______   N _______    __N__Cirrhosis	                  Cause __________     Stable Y ______   N _______    _N___GI Bleed                          Cause __________     Stable Y ______   N _______    Y____Anemia	                          Cause _____POSSIBLE CKD_____     Stable Y ___X___   N _______    ___N_Transfusion                  Last date ________      __N__ETOH Use	                  Last date________     Intervention __________________________    _N___Tobacco Abuse	          Last date ________    Intervention __________________________    __N__Drug Use	                 Type____________  Last dose _____ Intervention______________    ____Other                             Details_________________________________________________    Allergies    No Known Allergies    Intolerances      MEDICATIONS  (STANDING):    MEDICATIONS  (PRN):  morphine  - Injectable 2 milliGRAM(s) IV Push three times a day PRN Severe Pain (7 - 10)      [facility] Activity Status Index: Can the patient climb a flight of stairs or walk uphill?   __X____ N   <4 METS   _______ Y > 4 METS    Physical Exam:  Vital Signs Last 24 Hrs  T(C): 36.2 (09 May 2019 07:40), Max: 36.5 (08 May 2019 20:05)  T(F): 97.2 (09 May 2019 07:40), Max: 97.7 (08 May 2019 20:05)  HR: 75 (09 May 2019 07:40) (72 - 78)  BP: 154/71 (09 May 2019 07:40) (154/71 - 193/81)  BP(mean): --  RR: 18 (09 May 2019 07:40) (18 - 20)  SpO2: 95% (09 May 2019 06:15) (95% - 96%)    HEENT: AT NC    Chest: CTABL     Abd: +BS, ND, NT    Ext: NO EDEMA, TENDERNESS LEFT HIP    Neuro: NON FOCAL     LABS AND OTHER PERTINENT TESTS:                          9.8    8.23  )-----------( 215      ( 08 May 2019 21:45 )             27.6     05-08    143  |  107  |  31<H>  ----------------------------<  97  4.3   |  23  |  1.7<H>    Ca    8.9      08 May 2019 21:45  Mg     2.2     05-08    TPro  6.7  /  Alb  3.9  /  TBili  0.3  /  DBili  x   /  AST  20  /  ALT  12  /  AlkPhos  58  05-08    PT/INR - ( 08 May 2019 21:45 )   PT: 11.50 sec;   INR: 1.00 ratio         PTT - ( 08 May 2019 21:45 )  PTT:30.2 sec        Risk Assessment: (Use One)    [employer] NSQIP Surgical Risk Calculator http://riskcalculator.facs.org/  BELOW AVERAGE           ____X____  The patient is optimized for surgery/procedure and may proceed to the operating room as scheduled- FROM A MEDICAL STANDPOINT, PENDING CARDIO CLEARANCE     _________The patient is NOT optimized for surgery/procedure and should not proceed to the operating room as scheduled

## 2019-05-09 NOTE — H&P ADULT - NSHPPHYSICALEXAM_GEN_ALL_CORE
T(C): 36.1 (05-09-19 @ 00:38), Max: 36.5 (05-08-19 @ 20:05)  HR: 78 (05-09-19 @ 00:38) (72 - 78)  BP: 193/81 (05-09-19 @ 00:38) (168/83 - 193/81)  RR: 18 (05-09-19 @ 00:38) (18 - 20)  SpO2: 96% (05-09-19 @ 00:38) (96% - 96%)    PHYSICAL EXAM:  GENERAL: NAD, well-developed  HEAD:  Atraumatic, Normocephalic  NECK: Supple, No JVD  CHEST/LUNG: Clear to auscultation bilaterally; No wheeze  HEART: Loud systolic murmur   ABDOMEN: Soft, Nontender, Nondistended; Bowel sounds present  EXTREMITIES:  Left leg length shorter than the right unable to move   PSYCH: AAOx3  NEUROLOGY: non-focal  SKIN: No rashes or lesions

## 2019-05-09 NOTE — H&P ADULT - NSHPLABSRESULTS_GEN_ALL_CORE
9.8    8.23  )-----------( 215      ( 08 May 2019 21:45 )             27.6       05-08    143  |  107  |  31<H>  ----------------------------<  97  4.3   |  23  |  1.7<H>    Ca    8.9      08 May 2019 21:45  Mg     2.2     05-08    TPro  6.7  /  Alb  3.9  /  TBili  0.3  /  DBili  x   /  AST  20  /  ALT  12  /  AlkPhos  58  05-08

## 2019-05-09 NOTE — H&P ADULT - HISTORY OF PRESENT ILLNESS
89 yo F with PMH of    comes to the ED  after the fall. She fell while she was walking with the shopping cart.       Patient was seen by orthopedic surgery who wants medical clearance. 89 yo F with PMH of  CAD s/p multiple stents, PVD, Stage 4 CKD comes to the ED after mechanical fall. She fell while she was walking with the shopping cart. She denies any LOC, dizziness or palpitation. She was unable to stand after the fall.  Patient was seen by orthopedic surgery who wants medical clearance for the surgery.     She is in good shape, lives alone and does not need walker or cane. She does not take any blood thinners or antiplatelet drugs. She does not remember her medications. [facility]. 89 yo F with PMH of CAD s/p multiple stents, PVD left, Stage 4 CKD comes to the ED after mechanical fall. She fell while she was walking with the shopping cart. She denies any LOC, dizziness or palpitation. She was unable to stand after the fall.  Patient was seen by orthopedic surgery who wants medical clearance for the surgery.     She is in good shape, lives alone and does not need walker or cane. She does not take any blood thinners or antiplatelet drugs. She does not remember her medications. [facility].

## 2019-05-09 NOTE — PROVIDER CONTACT NOTE (OTHER) - BACKGROUND
Pt received from ED w/ (L) Hip Fx s/p fall. Pt noted to be hypertensive while in ED as well, however no intervention noted or endorsed from ED RN. Pt only received pain management.

## 2019-05-09 NOTE — CONSULT NOTE ADULT - SUBJECTIVE AND OBJECTIVE BOX
Date of Admission: 5/8/2019    CHIEF COMPLAINT: fall L hip pain    HISTORY OF PRESENT ILLNESS: 89yFemale with PMH below presented to the hospital for above CC found to have L hip fracture. patient is preop for ORIF. patient is usually very active as an outpatient and ambulates on her own, sometimes with assist devices. She has a history of CAD, last stent placed in 2010 at [facility]. She follows with her physicians there for her care. Last visit was 3 months ago and was told everything looked good. She denies chest pain. Has some SOB on ambulation on inclines but this has been chronic for years.     PAST MEDICAL & SURGICAL HISTORY:  PVD (peripheral vascular disease)  Chronic kidney disease (CKD)  CAD (coronary artery disease)  S/P cholecystectomy    HEALTH ISSUES - PROBLEM Dx:        FAMILY HISTORY:    None [ ]  Mother:   Father:   Siblings:     SOCIAL HISTORY:    [ ] Non-smoker  [ ] Smoker  [ ] Alcohol    Allergies    No Known Allergies    Intolerances    	    REVIEW OF SYSTEMS:  CONSTITUTIONAL: No fever, weight loss, or fatigue  CARDIOLOGY: PAtient denies chest pain, shortness of breath or syncopal episodes.   RESPIRATORY: denies shortness of breath, wheezeing.   NEUROLOGICAL: NO weakness, no focal deficits to report.  ENDOCRINOLOGICAL: no recent change in diabetic medications.   GI: no BRBPR, no N,V,diarrhea.    PSYCHIATRY: normal mood and affect  HEENT: no nasal discharge, no ecchymosis  SKIN: no ecchymosis, no breakdown  MUSCULOSKELETAL: Full range of motion x4.      PHYSICAL EXAM:  T(C): 36.2 (05-09-19 @ 07:40), Max: 36.5 (05-08-19 @ 20:05)  HR: 75 (05-09-19 @ 07:40) (72 - 78)  BP: 154/71 (05-09-19 @ 07:40) (154/71 - 193/81)  RR: 18 (05-09-19 @ 07:40) (18 - 20)  SpO2: 95% (05-09-19 @ 06:15) (95% - 96%)  Wt(kg): --  I&O's Summary    Daily Height in cm: 152.4 (08 May 2019 20:05)    Daily     General Appearance: Normal	  Cardiovascular: Normal S1 S2, No JVD, No murmurs, No edema  Respiratory: Lungs clear to auscultation	  Psychiatry: A & O x 3, Mood & affect appropriate  Gastrointestinal:  Soft, Non-tender  Skin: No rashes, No ecchymoses, No cyanosis	  Neurologic: Non-focal  Musculoskeletal/ extremities: Normal range of motion, No clubbing, cyanosis or edema  Vascular: Peripheral pulses palpable 2+ bilaterally    LABS:	 	                          8.7    7.75  )-----------( 188      ( 09 May 2019 11:50 )             25.2     05-09    143  |  108  |  27<H>  ----------------------------<  103<H>  4.3   |  24  |  1.5    Ca    8.5      09 May 2019 11:50  Mg     2.2     05-08    TPro  6.1  /  Alb  3.7  /  TBili  0.5  /  DBili  x   /  AST  27  /  ALT  22  /  AlkPhos  61  05-09        PT/INR - ( 08 May 2019 21:45 )   PT: 11.50 sec;   INR: 1.00 ratio         PTT - ( 08 May 2019 21:45 )  PTT:30.2 sec    CARDIAC MARKERS:            TELEMETRY EVENTS: 	    ECG: < from: 12 Lead ECG (05.08.19 @ 21:12) >    Diagnosis Line Normal sinus rhythm  Normal ECG    < end of copied text >   	  RADIOLOGY:   OTHER: 	    PREVIOUS DIAGNOSTIC TESTING:    [ x] Echocardiogram: < from: Transthoracic Echocardiogram (05.09.19 @ 10:44) >  Summary:   1. Left ventricular ejection fraction, by visual estimation, is 60 to   < from: Xray Femur 2 Views, Left (05.08.19 @ 21:54) >    Acute comminuted left femur subtrochanteric fracture with extension into   the lesser trochanter.    < end of copied text >  65%.   2. Mildly increased LV wall thickness.   3. Spectral Doppler shows impaired relaxation pattern of left   ventricular myocardial filling (Grade I diastolic dysfunction).   4. Thickening and calcification of the anterior mitral valve leaflet.   5. Aortic valve thickening with decreased leaflet opening.   6. Estimated pulmonary artery systolic pressure is 35.5 mmHg assuming a   right atrial pressure of 5 mmHg, which is consistent with borderline   pulmonary hypertension.   7. Peak transaortic gradient equals 27.0 mmHg, mean transaortic gradient   equals 14.3 mmHg, the calculated aortic valve area equals 1.57 cm² by the   continuity equation consistent with mild aortic stenosis.    < end of copied text >    [ ]  Catheterization:  [ ] Stress Test:  	  	    Home Medications:    MEDICATIONS  (STANDING):  heparin  Injectable 5000 Unit(s) SubCutaneous every 8 hours    MEDICATIONS  (PRN):  morphine  - Injectable 2 milliGRAM(s) IV Push three times a day PRN Severe Pain (7 - 10) Date of Admission: 5/8/2019    CHIEF COMPLAINT: fall L hip pain    HISTORY OF PRESENT ILLNESS: 89yFemale with PMH below presented to the hospital for above CC found to have L hip fracture. patient is preop for ORIF. patient is usually very active as an outpatient and ambulates on her own, sometimes with assist devices. She has a history of CAD, last stent placed in 2010 at [facility]. She follows with her physicians there for her care. Last visit was 3 months ago and was told everything looked good. She denies chest pain. Has some SOB on ambulation on inclines but this has been chronic for years.     PAST MEDICAL & SURGICAL HISTORY:  PVD (peripheral vascular disease)  Chronic kidney disease (CKD)  CAD (coronary artery disease)  S/P cholecystectomy    HEALTH ISSUES - PROBLEM Dx:        FAMILY HISTORY:    None contributory  Mother:   Father:   Siblings:     SOCIAL HISTORY:    [x ] Non-smoker  [ ] Smoker  no  Alcohol    Allergies    No Known Allergies    Intolerances    	    REVIEW OF SYSTEMS:  CONSTITUTIONAL: No fever, weight loss, or fatigue  CARDIOLOGY: PAtient denies chest pain, shortness of breath or syncopal episodes.   RESPIRATORY: denies shortness of breath, wheezeing.   NEUROLOGICAL: NO weakness, no focal deficits to report.  ENDOCRINOLOGICAL: no recent change in diabetic medications.   GI: no BRBPR, no N,V,diarrhea.    PSYCHIATRY: normal mood and affect  HEENT: no nasal discharge, no ecchymosis  SKIN: no ecchymosis, no breakdown  MUSCULOSKELETAL: Full range of motion x4.      PHYSICAL EXAM:  T(C): 36.2 (05-09-19 @ 07:40), Max: 36.5 (05-08-19 @ 20:05)  HR: 75 (05-09-19 @ 07:40) (72 - 78)  BP: 154/71 (05-09-19 @ 07:40) (154/71 - 193/81)  RR: 18 (05-09-19 @ 07:40) (18 - 20)  SpO2: 95% (05-09-19 @ 06:15) (95% - 96%)  Wt(kg): --  I&O's Summary    Daily Height in cm: 152.4 (08 May 2019 20:05)    Daily     General Appearance: Normal	  Cardiovascular: Normal S1 S2, No JVD, 2/6 syst m+, No edema  Respiratory: Lungs clear to auscultation	  Psychiatry: A & O x 3, Mood & affect appropriate  Gastrointestinal:  Soft, Non-tender  Skin: No rashes, No ecchymoses, No cyanosis	  Neurologic: Non-focal  Musculoskeletal/ extremities: Normal range of motion, No clubbing, cyanosis or edema  Vascular: Peripheral pulses palpable 2+ bilaterally    LABS:	 	                          8.7    7.75  )-----------( 188      ( 09 May 2019 11:50 )             25.2     05-09    143  |  108  |  27<H>  ----------------------------<  103<H>  4.3   |  24  |  1.5    Ca    8.5      09 May 2019 11:50  Mg     2.2     05-08    TPro  6.1  /  Alb  3.7  /  TBili  0.5  /  DBili  x   /  AST  27  /  ALT  22  /  AlkPhos  61  05-09        PT/INR - ( 08 May 2019 21:45 )   PT: 11.50 sec;   INR: 1.00 ratio         PTT - ( 08 May 2019 21:45 )  PTT:30.2 sec    CARDIAC MARKERS:            TELEMETRY EVENTS: 	    ECG: < from: 12 Lead ECG (05.08.19 @ 21:12) >    Diagnosis Line Normal sinus rhythm  Normal ECG    < end of copied text >   	  RADIOLOGY:   OTHER: 	    PREVIOUS DIAGNOSTIC TESTING:    [ x] Echocardiogram: < from: Transthoracic Echocardiogram (05.09.19 @ 10:44) >  Summary:   1. Left ventricular ejection fraction, by visual estimation, is 60 to   < from: Xray Femur 2 Views, Left (05.08.19 @ 21:54) >    Acute comminuted left femur subtrochanteric fracture with extension into   the lesser trochanter.    < end of copied text >  65%.   2. Mildly increased LV wall thickness.   3. Spectral Doppler shows impaired relaxation pattern of left   ventricular myocardial filling (Grade I diastolic dysfunction).   4. Thickening and calcification of the anterior mitral valve leaflet.   5. Aortic valve thickening with decreased leaflet opening.   6. Estimated pulmonary artery systolic pressure is 35.5 mmHg assuming a   right atrial pressure of 5 mmHg, which is consistent with borderline   pulmonary hypertension.   7. Peak transaortic gradient equals 27.0 mmHg, mean transaortic gradient   equals 14.3 mmHg, the calculated aortic valve area equals 1.57 cm² by the   continuity equation consistent with mild aortic stenosis.    < end of copied text >    [ ]  Catheterization:  [ ] Stress Test:  	  	    Home Medications:    MEDICATIONS  (STANDING):  heparin  Injectable 5000 Unit(s) SubCutaneous every 8 hours    MEDICATIONS  (PRN):  morphine  - Injectable 2 milliGRAM(s) IV Push three times a day PRN Severe Pain (7 - 10)

## 2019-05-10 NOTE — PROGRESS NOTE ADULT - SUBJECTIVE AND OBJECTIVE BOX
Ortho Preop Note    Patient is a 89y old  Female s/p mechanical fall    Diagnosis: L subtroch femur fx   Procedure: L femur ORIF                           8.7    7.75  )-----------( 188      ( 09 May 2019 11:50 )             25.2     05-09    143  |  108  |  27<H>  ----------------------------<  103<H>  4.3   |  24  |  1.5    Ca    8.5      09 May 2019 11:50  Mg     2.2     05-08    TPro  6.1  /  Alb  3.7  /  TBili  0.5  /  DBili  x   /  AST  27  /  ALT  22  /  AlkPhos  61  05-09    PT/INR - ( 08 May 2019 21:45 )   PT: 11.50 sec;   INR: 1.00 ratio         PTT - ( 08 May 2019 21:45 )  PTT:30.2 sec  Xray Chest 1 View AP/PA:   EXAM:  XR CHEST FRONTAL 1V            PROCEDURE DATE:  05/08/2019            INTERPRETATION:  Clinical History / Reason for exam: Fall    Comparison : Chest radiograph None.    Technique/Positioning: Single AP view.    Findings:    Support devices:None.    Cardiac/mediastinum/hilum: Unremarkable.    Lung parenchyma/Pleura: No consolidation, effusion or pneumothorax. Right   apical skin fold simulating pneumothorax.    Skeleton/soft tissues: No acute abnormality.    Impression:      No consolidation, effusion or pneumothorax.                      ELLIOT LANDAU M.D., ATTENDING RADIOLOGIST  This document has been electronically signed. May  9 2019  9:40AM             (05-08-19 @ 21:53)  12 Lead ECG:   Ventricular Rate 70 BPM    Atrial Rate 70 BPM    P-R Interval 150 ms    QRS Duration 70 ms    Q-T Interval 412 ms    QTC Calculation(Bezet) 444 ms    P Axis 77 degrees    R Axis 29 degrees    T Axis 70 degrees    Diagnosis Line Normal sinus rhythm  Normal ECG    Confirmed by Reji Carrera ([id number]) on 5/8/2019 10:17:34 PM (05-08-19 @ 21:12)          Have Type & Screen active   CBC trend q 8 hrs  [x] Chest X-ray  [x] EKG  [x] NPO/IVF  [x] Consent  Clearance pending   [x] Added on to OR Schedule       Assessment & Plan:  89yFemale with L subtroch femur fx  -For OR today     [id number] Ortho Preop Note    Patient is a 89y old  Female s/p mechanical fall    Diagnosis: L subtroch femur fx   Procedure: L femur ORIF                           8.7    7.75  )-----------( 188      ( 09 May 2019 11:50 )             25.2     05-09    143  |  108  |  27<H>  ----------------------------<  103<H>  4.3   |  24  |  1.5    Ca    8.5      09 May 2019 11:50  Mg     2.2     05-08    TPro  6.1  /  Alb  3.7  /  TBili  0.5  /  DBili  x   /  AST  27  /  ALT  22  /  AlkPhos  61  05-09    PT/INR - ( 08 May 2019 21:45 )   PT: 11.50 sec;   INR: 1.00 ratio         PTT - ( 08 May 2019 21:45 )  PTT:30.2 sec  Xray Chest 1 View AP/PA:   EXAM:  XR CHEST FRONTAL 1V            PROCEDURE DATE:  05/08/2019            INTERPRETATION:  Clinical History / Reason for exam: Fall    Comparison : Chest radiograph None.    Technique/Positioning: Single AP view.    Findings:    Support devices:None.    Cardiac/mediastinum/hilum: Unremarkable.    Lung parenchyma/Pleura: No consolidation, effusion or pneumothorax. Right   apical skin fold simulating pneumothorax.    Skeleton/soft tissues: No acute abnormality.    Impression:      No consolidation, effusion or pneumothorax.                      ELLIOT LANDAU M.D., ATTENDING RADIOLOGIST  This document has been electronically signed. May  9 2019  9:40AM             (05-08-19 @ 21:53)  12 Lead ECG:   Ventricular Rate 70 BPM    Atrial Rate 70 BPM    P-R Interval 150 ms    QRS Duration 70 ms    Q-T Interval 412 ms    QTC Calculation(Bezet) 444 ms    P Axis 77 degrees    R Axis 29 degrees    T Axis 70 degrees    Diagnosis Line Normal sinus rhythm  Normal ECG    Confirmed by Reji Carrera ([id number]) on 5/8/2019 10:17:34 PM (05-08-19 @ 21:12)          Have Type & Screen active   CBC trend q 8 hrs  [x] Chest X-ray  [x] EKG  [x] NPO/IVF  [x] Consent  Clearance pending   [x] Added on to OR Schedule       Assessment & Plan:  89yFemale with L subtroch femur fx  -For OR today   pt seen and examined with residents  getting blood discussed plan with family  expect IM nail tonight if available    [id number]

## 2019-05-10 NOTE — PROGRESS NOTE ADULT - ASSESSMENT
89 yoF with hx of CAD with multiple stents, peripheral vascular disease, and stage 4 CKD presents after a mechanical fall with a left hip subtronchanteric comminuted fracture, she has medicine and cardiology clearance for orthopedic surgery today    # Left subtrochanteric comminuted fracture  -scheduled for left femur ORIF today with Dr. Nichols  -has medicine and cardiology clearance  -pain control with morphine 2 mg IV tid prn  -on NS 50 maintenance fluids    # CAD with multiple stents  -on plavix 75 at home / prescribed carvedilol 6.25 bid a year ago but has not been taking at home  -echo shows EF 65% / grade 1 diastolic dysfunction / mild aortic stenosis    # Hypertension  -amlodipine 10 at home, will restart after surgery  BP x 24 hours: BP:  (104/53 - 209/83)    # Peripheral vascular disease  -cilostazol 100 bid at home, will restart after surgery    # Iron deficiency anemia  -hgb 8.7 today, iron studies suggest deficiency / will start on ferrous sulfate after surgery    PLAN: ORIF today, f/u hgb tomorrow    DVT PPX heparin [id number]  from home 89 yoF with hx of CAD with multiple stents, peripheral vascular disease, and stage 4 CKD presents after a mechanical fall with a left hip subtronchanteric comminuted fracture, she has medicine and cardiology clearance for orthopedic surgery today    # Left subtrochanteric comminuted fracture  -scheduled for left femur ORIF today with Dr. Nichols  -has medicine and cardiology clearance  -pain control with morphine 2 mg IV tid prn  -on NS 50 maintenance fluids    # CAD with multiple stents  -on plavix 75 at home / prescribed carvedilol 6.25 bid a year ago but has not been taking at home  -echo shows EF 65% / grade 1 diastolic dysfunction / mild aortic stenosis    # Hypertension  -amlodipine 10 at home, will restart after surgery  BP x 24 hours: BP:  (104/53 - 209/83)    # Peripheral vascular disease  -cilostazol 100 bid at home, will restart after surgery    # Iron deficiency anemia and possible bleeding  -hgb 7.3 today from 8.7, iron studies suggest deficiency / will start on ferrous sulfate after surgery and giving 1 unit prbc now before surgery    PLAN: ORIF today, f/u hgb tomorrow    DVT PPX heparin [id number]  from home 89 yoF with hx of CAD with multiple stents, peripheral vascular disease, and stage 4 CKD presents after a mechanical fall with a left hip subtronchanteric comminuted fracture, she has medicine and cardiology clearance for orthopedic surgery today    # Left subtrochanteric comminuted fracture  -scheduled for left femur ORIF today with Dr. Nichols  -has medicine and cardiology clearance  -pain control with morphine 2 mg IV tid prn  -on NS 50 maintenance fluids    # CAD with multiple stents  -on plavix 75 at home / prescribed carvedilol 6.25 bid a year ago but has not been taking at home  -echo shows EF 65% / grade 1 diastolic dysfunction / mild aortic stenosis    # Hypertension  -amlodipine 10 at home, will restart after surgery  BP x 24 hours: BP:  (104/53 - 209/83)    # Peripheral vascular disease  -cilostazol 100 bid at home, will restart after surgery    # Iron deficiency anemia and possible bleeding  -hgb 7.3 today from 8.7, iron studies suggest deficiency / will start on ferrous sulfate after surgery and giving 2 unit prbc now before surgery    PLAN: ORIF today, f/u hgb after surgery and tomorrow    DVT PPX heparin [id number]  from home

## 2019-05-10 NOTE — PROGRESS NOTE ADULT - SUBJECTIVE AND OBJECTIVE BOX
SUBJECTIVE:    Patient is a 89y old Female who presents with a chief complaint of Hip fracture (10 May 2019 07:25)    alert and oriented, not in any pain, no complaints     Besides the pertinent positives and negatives described above, the ROS was within normal limits.    PAST MEDICAL & SURGICAL HISTORY  PVD (peripheral vascular disease)  Chronic kidney disease (CKD)  CAD (coronary artery disease)  S/P cholecystectomy    SOCIAL HISTORY:    ALLERGIES:  No Known Allergies    MEDICATIONS:  STANDING MEDICATIONS  heparin  Injectable 5000 Unit(s) SubCutaneous every 8 hours  sodium chloride 0.9%. 1000 milliLiter(s) IV Continuous <Continuous>    PRN MEDICATIONS  morphine  - Injectable 2 milliGRAM(s) IV Push three times a day PRN    VITALS:   T(F): 98.3  HR: 74  BP: 162/71  RR: 18  SpO2: 98%    LABS:                        7.3    8.80  )-----------( 160      ( 10 May 2019 06:08 )             21.1     05-10    144  |  107  |  30<H>  ----------------------------<  116<H>  4.3   |  25  |  1.7<H>    Ca    8.3<L>      10 May 2019 06:08  Mg     2.2     05-10    TPro  5.3<L>  /  Alb  3.2<L>  /  TBili  0.4  /  DBili  x   /  AST  19  /  ALT  17  /  AlkPhos  49  05-10    PT/INR - ( 08 May 2019 21:45 )   PT: 11.50 sec;   INR: 1.00 ratio         PTT - ( 08 May 2019 21:45 )  PTT:30.2 sec              RADIOLOGY:    PHYSICAL EXAM:  GEN: No acute distress  LUNGS: Clear to auscultation bilaterally   HEART: Regular  ABD: Soft, non-tender, non-distended.  EXT: NC/NE/2+PP/PALENCIA/Skin Intact.   NEURO: AAOX3    Intravenous access: yes  NG tube: no  Abreu Catheter: no

## 2019-05-10 NOTE — PROGRESS NOTE ADULT - ATTENDING COMMENTS
Patient seen and examined independently. I agree with the resident's note, physical exam, and plan except as below.  Vital Signs Last 24 Hrs  T(C): 36.8 (10 May 2019 07:34), Max: 37.5 (09 May 2019 23:00)  T(F): 98.3 (10 May 2019 07:34), Max: 99.5 (09 May 2019 23:00)  HR: 74 (10 May 2019 07:34) (74 - 85)  BP: 162/71 (10 May 2019 07:34) (104/53 - 209/83)  BP(mean): --  RR: 18 (10 May 2019 07:34) (17 - 18)  SpO2: 98% (09 May 2019 17:18) (98% - 98%)  PE  nad  aaox3  s1s2+JESSICA  ctabl  soft ntnd+bs  LLE ext rotated and shortned, tender to light palp left hip, edematous   chornic venous changes bilat lower extrem      #sp fall - pt states she tripped over shopping cart - mechanical - no signs of syncope  #LEft hip subtroch frx - add on for ORIF today with ortho  -pain control  -dvt proph  #acute on chronic anemia - likely acute blood loss anemia - transfuse 2 units prbc prior to OR and recheck post op  #CKD IV stable   #CAD with PCI - cont medical mgnt - seen by cardio here - agree with moderate risk for intermediate risk procedure    discussed with pt and family at bedside  post op pt/rehab Patient seen and examined independently. I agree with the resident's note, physical exam, and plan except as below.  Vital Signs Last 24 Hrs  T(C): 36.8 (10 May 2019 07:34), Max: 37.5 (09 May 2019 23:00)  T(F): 98.3 (10 May 2019 07:34), Max: 99.5 (09 May 2019 23:00)  HR: 74 (10 May 2019 07:34) (74 - 85)  BP: 162/71 (10 May 2019 07:34) (104/53 - 209/83)  BP(mean): --  RR: 18 (10 May 2019 07:34) (17 - 18)  SpO2: 98% (09 May 2019 17:18) (98% - 98%)  PE  nad  aaox3  s1s2+JESSICA  ctabl  soft ntnd+bs  LLE ext rotated and shortned, tender to light palp left hip, edematous   chornic venous changes bilat lower extrem      #sp fall - pt states she tripped over shopping cart - mechanical - no signs of syncope  #LEft hip subtroch frx - add on for ORIF today with ortho  -pain control  -dvt proph  #acute on chronic anemia - likely acute blood loss anemia - transfuse 2 units prbc prior to OR and recheck post op  #CKD IV stable   #CAD with PCI - cont medical mgnt - seen by cardio here - agree with moderate risk for intermediate risk procedure  at home pt was on plavix - was held here - will give ASA 81 now - post op when stable can switch back to plavix    discussed with pt and family at bedside/ ortho PA  post op pt/rehab

## 2019-05-11 NOTE — PROGRESS NOTE ADULT - SUBJECTIVE AND OBJECTIVE BOX
ESSIE ARMENDARIZ 89y Female  MRN#: [id number]     SUBJECTIVE  Patient is a 89y old Female who presents with a chief complaint of Hip fracture (10 May 2019 10:10)  Currently admitted to medicine with the primary diagnosis of Subtrochanteric fracture of left femur    Today is hospital day 3d, and this morning she is sleeping comfortably, family at bedside. Overnight did not go for add-on surgery, otherwise no events. Family upset surgery moved, awaiting surgery today.     OBJECTIVE  PAST MEDICAL & SURGICAL HISTORY  PVD (peripheral vascular disease)  Chronic kidney disease (CKD)  CAD (coronary artery disease)  S/P cholecystectomy    ALLERGIES:  No Known Allergies    MEDICATIONS:  STANDING MEDICATIONS  amLODIPine   Tablet 10 milliGRAM(s) Oral daily  atorvastatin 20 milliGRAM(s) Oral at bedtime  heparin  Injectable 5000 Unit(s) SubCutaneous every 8 hours  sodium chloride 0.9%. 1000 milliLiter(s) IV Continuous <Continuous>    PRN MEDICATIONS  morphine  - Injectable 2 milliGRAM(s) IV Push three times a day PRN      VITAL SIGNS: Last 24 Hours  T(C): 37.3 (11 May 2019 07:14), Max: 37.7 (11 May 2019 00:00)  T(F): 99.1 (11 May 2019 07:14), Max: 99.8 (11 May 2019 00:00)  HR: 97 (11 May 2019 07:14) (77 - 97)  BP: 162/71 (11 May 2019 07:14) (131/64 - 177/77)  BP(mean): --  RR: 18 (11 May 2019 07:14) (18 - 18)  SpO2: --    LABS:                        9.6    11.33 )-----------( 134      ( 11 May 2019 04:30 )             27.3     05-11    141  |  107  |  33<H>  ----------------------------<  113<H>  4.2   |  22  |  1.8<H>    Ca    7.7<L>      11 May 2019 04:30  Mg     2.2     05-11    TPro  5.2<L>  /  Alb  2.8<L>  /  TBili  0.8  /  DBili  x   /  AST  20  /  ALT  16  /  AlkPhos  44  05-11                  RADIOLOGY:      PHYSICAL EXAM:  GENERAL: NAD, thin, AAOx2  HEENT: Atraumatic, Normocephalic   PULMONARY: Clear to auscultation bilaterally; No wheezing or crackles   CARDIOVASCULAR: Regular rate and rhythm; mild systolic   GASTROINTESTINAL: Soft, Nontender, Nondistended; Bowel sounds present  EXTREMITIES: 2+ Peripheral Pulses, No clubbing, cyanosis, or edema  NEUROLOGY: non-focal  SKIN: No rashes or lesions

## 2019-05-11 NOTE — PROGRESS NOTE ADULT - ATTENDING COMMENTS
Patient seen and examined independently. I agree with the resident's note, physical exam, and plan except as below.  seen this am at bedside on 4b with family  Vital Signs Last 24 Hrs  T(C): 37.3 (11 May 2019 07:14), Max: 37.7 (11 May 2019 00:00)  T(F): 99.1 (11 May 2019 07:14), Max: 99.8 (11 May 2019 00:00)  HR: 97 (11 May 2019 07:14) (87 - 97)  BP: 162/71 (11 May 2019 07:14) (131/64 - 177/77)  BP(mean): --  RR: 18 (11 May 2019 07:14) (18 - 18)  SpO2: --  pe  nad  aaox3  [id number] +JESSICA  ctabl  soft ntnd+bs  Lhip edmea, tender, short, and rotated        #sp fall - pt states she tripped over shopping cart - mechanical - no signs of syncope  #LEft hip subtroch frx - add on for ORIF today with ortho -benja POWELLN  -pain control  -dvt proph  #acute on chronic anemia - likely acute blood loss anemia - responded appropriately to transfusion - stable   #CKD IV stable   #CAD with PCI - cont medical mgnt - seen by cardio here - agree with moderate risk for intermediate risk procedure  at home pt was on plavix - was held here - will give ASA 81  - post op when stable can switch back to plavix    discussed with pt and family at bedside/ ortho PA  post op pt/rehab .

## 2019-05-11 NOTE — PROGRESS NOTE ADULT - ASSESSMENT
ADMISSION SUMMARY  89 yoF with hx of CAD with multiple stents, peripheral vascular disease, and stage 4 CKD presents after a mechanical fall with a left hip subtronchanteric comminuted fracture, she has medicine and cardiology clearance for orthopedic surgery     ASSESSMENT & PLAN    # Left subtrochanteric comminuted fracture  - To go for left femur ORIF today - d/w with ortho, likely early afternoon per OR schedule  - Medicine and cardiology clearance documented in chart on (5/9)  - Pain control with morphine 2 mg IV TID PRN - not requiring  - C/w IVF NS @ 50cc/hr for now while NPO     # CAD with multiple stents  - On Plavix 75 mg daily at home / prescribed carvedilol 6.25 bid a year ago but has not been taking at home  - 2D Echo: LVEF 65% / grade 1 diastolic dysfunction / mild aortic stenosis  - C/w  mg daily for now, post-op if Hgb stable can resume Plavix    # Hypertension  - On amlodipine 10 at home, will restart after surgery    # Peripheral vascular disease  - Cilostazol 100 BID at home, will restart after surgery  - C/w Lipitor 20 mg daily    # Iron deficiency anemia and possible bleeding  - Hgb 7.3 today from 8.7  - Given 2 units pRBC with appropriate response > 9.8 > 9.6  - Iron studies suggest deficiency / will start on ferrous sulfate after surgery     # GI ppx  - Diet: NPO for now prior to surgery, resume DASH diet post-op    # DVT ppx  - Hep subQ     PLAN: ORIF today, f/u hgb after surgery and tomorrow  - Code status: FULL CODE

## 2019-05-11 NOTE — CHART NOTE - NSCHARTNOTEFT_GEN_A_CORE
PACU ANESTHESIA PACU ADMISSION NOTE      Procedure:Intramedullary nailing of left femur    Post op diagnosisSubtrochanteric fracture of left femur      ____ Intubated  TV:______       Rate: ______      FiO2: ______    ___x_ Patent Airway    ___x_ Full return of protective reflexes    ____ Full recovery from anesthesia / sedation to baseline status    Viitals:  see anesthesia record          Mental Status:  _x___ Awake   _____ Alert   _____ Drowsy   _____ Sedated    Nausea/Vomiting: ____ Yes, See Post - Op Orders      ___x_ No    Pain Scale (0-10): _____    Treatment: ____ None    ___x_ See Post - Op/PCA Orders    Post - Operative Fluids:   ____ Oral   _x___ See Post - Op Orders    Plan:         Discharge:   ____Home       ___x__Floor         _____Critical Care    _____Other:_________________    Comments:  uneventful perioperative course; VSS on admission to PACU; endorsed to PACU RN

## 2019-05-12 NOTE — CONSULT NOTE ADULT - SUBJECTIVE AND OBJECTIVE BOX
HPI:  89 yo F with PMH of CAD s/p multiple stents, PVD left, Stage 4 CKD comes to the ED after mechanical fall. She fell while she was walking with the shopping cart. She denies any LOC, dizziness or palpitation. She was unable to stand after the fall.  Patient was seen by orthopedic surgery who wants medical clearance for the surgery.     She is in good shape, lives alone and does not need walker or cane. She does not take any blood thinners or antiplatelet drugs. She does not remember her medications. [facility] [email or web address]. (09 May 2019 02:44)      PAST MEDICAL & SURGICAL HISTORY:  PVD (peripheral vascular disease)  Chronic kidney disease (CKD)  CAD (coronary artery disease)  S/P cholecystectomy      Hospital Course:  She walks without an ad indoors but uses a shopping cart outdoors. She is S/p left hip IMN with dr. Cartagena on 5/11. Cleared for TTWB LLE.   TODAY'S SUBJECTIVE & REVIEW OF SYMPTOMS:     Constitutional WNL   Cardio WNL   Resp WNL   GI WNL  Heme WNL  Endo WNL  Skin WNL  MSK WNL  Neuro WNL  Cognitive WNL  Psych WNL      MEDICATIONS  (STANDING):  amLODIPine   Tablet 10 milliGRAM(s) Oral daily  aspirin enteric coated 81 milliGRAM(s) Oral daily  atorvastatin 20 milliGRAM(s) Oral at bedtime  clopidogrel Tablet 75 milliGRAM(s) Oral daily  heparin  Injectable 5000 Unit(s) SubCutaneous every 8 hours    MEDICATIONS  (PRN):  morphine  - Injectable 1 milliGRAM(s) IV Push every 10 minutes PRN Mild Pain (1 - 3)  morphine  - Injectable 2 milliGRAM(s) IV Push three times a day PRN Severe Pain (7 - 10)      FAMILY HISTORY:      Allergies    No Known Allergies    Intolerances        SOCIAL HISTORY:    [  ] Etoh  [  ] Smoking  [  ] Substance abuse     Home Environment:  [x  ] Home Alone  [  ] Lives with Family  [  ] Home Health Aid    Dwelling:  [X  ] Apartment  [  ] Private House  [  ] Adult Home  [  ] Skilled Nursing Facility      [  ] Short Term  [  ] Long Term  [  ] Stairs       Elevator [  ]    FUNCTIONAL STATUS PTA: (Check all that apply)  Ambulation: [ x  ]Independent    [  ] Dependent     [  ] Non-Ambulatory  Assistive Device: [  ] SA Cane  [  ]  Q Cane  [  ] Walker  [  ]  Wheelchair  Needs shopping cart ouitdoors.  ADL : [  ] Independent  [  ]  Dependent       Vital Signs Last 24 Hrs  T(C): 36.7 (12 May 2019 15:41), Max: 37.7 (12 May 2019 04:00)  T(F): 98 (12 May 2019 15:41), Max: 99.9 (12 May 2019 04:00)  HR: 99 (12 May 2019 15:41) (70 - 99)  BP: 150/60 (12 May 2019 15:41) (138/63 - 180/76)  BP(mean): --  RR: 18 (12 May 2019 15:41) (11 - 18)  SpO2: 98% (12 May 2019 15:41) (95% - 100%)      PHYSICAL EXAM: Alert & Oriented X3  GENERAL: NAD, well-groomed, well-developed  HEAD:  Atraumatic, Normocephalic  EYES: EOMI, PERRLA, conjunctiva and sclera clear  NECK: Supple, No JVD, Normal thyroid  CHEST/LUNG: Clear to percussion bilaterally; No rales, rhonchi, wheezing, or rubs  HEART: Regular rate and rhythm; No murmurs, rubs, or gallops  ABDOMEN: Soft, Nontender, Nondistended; Bowel sounds present  EXTREMITIES:  2+ Peripheral Pulses, No clubbing, cyanosis, or edema    NERVOUS SYSTEM:  Cranial Nerves 2-12 intact [  ] Abnormal  [  ]  ROM: WFL all extremities [  ]  Abnormal [  ]  Motor Strength: WFL all extremities  [ X ]  Abnormal [  ]  Sensation: intact to light touch [X  ] Abnormal [  ]  Reflexes: Symmetric [  ]  Abnormal [  ]    FUNCTIONAL STATUS:  Bed Mobility: Independent [  ]  Supervision [  ]  Needs Assistance [  ]  N/A [  ]  Transfers: Independent [  ]  Supervision [  ]  Needs Assistance [  ]  N/A [  ]   Ambulation: Independent [  ]  Supervision [  ]  Needs Assistance [  ]  N/A [  ]  ADL: Independent [  ] Requires Assistance [  ] N/A [  ]      LABS:                        7.7    11.39 )-----------( 128      ( 12 May 2019 07:00 )             22.2     05-12    144  |  108  |  40<H>  ----------------------------<  138<H>  4.6   |  24  |  2.1<H>    Ca    7.9<L>      12 May 2019 07:00  Phos  5.5     05-12  Mg     2.3     05-12    TPro  4.9<L>  /  Alb  2.7<L>  /  TBili  0.4  /  DBili  x   /  AST  23  /  ALT  13  /  AlkPhos  41  05-12          RADIOLOGY & ADDITIONAL STUDIES:    Assesment:

## 2019-05-12 NOTE — PROGRESS NOTE ADULT - SUBJECTIVE AND OBJECTIVE BOX
Patient seen and examined during AM rounds. Resting comfortably, no active complaints. Denies fevers, chills, SOB.    Vital Signs Last 24 Hrs  T(C): 36.7 (12 May 2019 15:41), Max: 37.7 (12 May 2019 04:00)  T(F): 98 (12 May 2019 15:41), Max: 99.9 (12 May 2019 04:00)  HR: 99 (12 May 2019 15:41) (70 - 99)  BP: 150/60 (12 May 2019 15:41) (138/63 - 180/76)  BP(mean): --  RR: 18 (12 May 2019 15:41) (11 - 18)  SpO2: 98% (12 May 2019 15:41) (95% - 100%)    PE :  LLE :  Dressings with minimal serosang drainage  Thigh and calf soft and compressible  EHL TA GS motor intact  SILT distally  Foot warm and perfused                          7.7    11.39 )-----------( 128      ( 12 May 2019 07:00 )             22.2       A/P  89F POD1 s/p L Femur IMN:  - Pain control  - DVT PPX: Can resume  - IS  - TTWB LLE  - PT OT OOBTC  - Medical management per primary team  - Ortho to continue following Patient seen and examined during AM rounds. Resting comfortably, no active complaints. Denies fevers, chills, SOB.    Vital Signs Last 24 Hrs  T(C): 36.7 (12 May 2019 15:41), Max: 37.7 (12 May 2019 04:00)  T(F): 98 (12 May 2019 15:41), Max: 99.9 (12 May 2019 04:00)  HR: 99 (12 May 2019 15:41) (70 - 99)  BP: 150/60 (12 May 2019 15:41) (138/63 - 180/76)  BP(mean): --  RR: 18 (12 May 2019 15:41) (11 - 18)  SpO2: 98% (12 May 2019 15:41) (95% - 100%)    PE :  LLE :  Dressings with minimal serosang drainage  Thigh and calf soft and compressible  EHL TA GS motor intact  SILT distally  Foot warm and perfused                          7.7    11.39 )-----------( 128      ( 12 May 2019 07:00 )             22.2       A/P  89F POD1 s/p L Femur IMN:  - Pain control  - DVT PPX: Can resume  - IS  - TTWB LLE  - PT OT OOBTC  - Medical management per primary team  - Ortho to continue following  pt seen and examined at bedside  family present  check H and H  agree with plan

## 2019-05-12 NOTE — PROGRESS NOTE ADULT - ASSESSMENT
#sp fall - pt states she tripped over shopping cart - mechanical - no signs of syncope  #LEft hip subtroch frx - pod1, sp IMN - TTWB, rehab  -pain control  -dvt proph  #acute on chronic anemia - likely acute blood loss anemia - responded appropriately to transfusion preop  100 cc EBL per op note  liekly post op anemia due to acute blood loss   monitor cbc - if drops further would transfuse prbc    #CKD IV  with MARILU - cont to monitor BMP - liekly due to anemia   #CAD with PCI - cont medical mgnt - seen by cardio here - agree with moderate risk for intermediate risk procedure  at home pt was on plavix - was held here - will give ASA 81  - post op when stable can switch back to plavix    discussed with pt and family at bedside/ ortho PA  post op pt/rehab .

## 2019-05-12 NOTE — PHYSICAL THERAPY INITIAL EVALUATION ADULT - ADDITIONAL COMMENTS
Pt states that she was not using any AD however, mentioned that she was using a shopping cart when she would walk to the store.

## 2019-05-12 NOTE — PROGRESS NOTE ADULT - SUBJECTIVE AND OBJECTIVE BOX
ORTHO POC    Patient seen and examined after her surgery. Resting comfortably, no active complaints. Denies fevers, chills, SOB.    PE :  LLE :  Dressings c/d/i  Thigh and calf soft and compressible  EHL TA GS motor intact  SILT distally  Foot warm and perfused    A/P  89F s/p L Femur IMN:  - Pain control  - DVT PPX: Can resume  - IS  - TTWB LLE  - PT OT OOBTC  - Home meds  - Medical management per primary team  - Ortho to continue following

## 2019-05-12 NOTE — CONSULT NOTE ADULT - ASSESSMENT
IMPRESSION: Rehab of left hip subtroch fx, S/P imn      PRECAUTIONS: [X  ] Cardiac  [  ] Respiratory  [  ] Seizures [  ] Contact Isolation  [  ] Droplet Isolation  [  ] Other    Weight Bearing Status:   ttwb lle    RECOMMENDATION:    Out of Bed to Chair     DVT/Decubiti Prophylaxis    REHAB PLAN:     [ XX  ] Bedside P/T 3-5 times a week   [   ]   Bedside O/T  2-3 times a week             [   ] No Rehab Therapy Indicated                   [   ]  Speech Therapy   Conditioning/ROM                                    ADL  Bed Mobility                                               Conditioning/ROM  Transfers                                                     Bed Mobility  Sitting /Standing Balance                         Transfers                                        Gait Training                                               Sitting/Standing Balance  Stair Training [   ]Applicable                    Home equipment Eval                                                                        Splinting  [   ] Only      GOALS:   ADL   [ X  ]   Independent                    Transfers  [ X  ] Independent                          Ambulation  [ X  ] Independent     [ X   ] With device                            [  X ]  CG                                                         [ X  ]  CG                                                                  [ X  ] CG                            [    ] Min A                                                   [   ] Min A                                                              [   ] Min  A          DISCHARGE PLAN:   [   ]  Good candidate for Intensive Rehabilitation/Hospital based-4A [facility]                                             Will tolerate 3hrs Intensive Rehab Daily                                       [ XX   ]  Short Term Rehab in Skilled Nursing Facility                                       [    ]  Home with Outpatient or  services                                         [    ]  Possible Candidate for Intensive Hospital based Rehab

## 2019-05-12 NOTE — PHYSICAL THERAPY INITIAL EVALUATION ADULT - RANGE OF MOTION EXAMINATION, REHAB EVAL
LLE limited for AROM hip and knee flexion, no PROM limitations noted/bilateral upper extremity ROM was WNL (within normal limits)/deficits as listed below

## 2019-05-12 NOTE — PHYSICAL THERAPY INITIAL EVALUATION ADULT - LEVEL OF INDEPENDENCE: GAIT, REHAB EVAL
unable to perform unable to perform/Pt unable to advance LE and was non-verbal when PT was attempting to assess her status while in standing. PT opted to sit pt and subsequently return her to bed for safety reasons.

## 2019-05-12 NOTE — PROGRESS NOTE ADULT - SUBJECTIVE AND OBJECTIVE BOX
HOSPITALIST ATTENDING NOTE    MARCELLOGAESSIE  89y Female  [id number]    INTERVAL HPI/OVERNIGHT EVENTS: pod 1, pain mildly controlled     T(C): 36.4 (05-12-19 @ 08:02), Max: 37.7 (05-12-19 @ 04:00)  HR: 93 (05-12-19 @ 08:02) (70 - 97)  BP: 138/63 (05-12-19 @ 08:02) (138/63 - 180/76)  RR: 18 (05-12-19 @ 08:02) (11 - 18)  SpO2: 100% (05-11-19 @ 19:32) (95% - 100%)  Wt(kg): --    05-11-19 @ 07:01  -  05-12-19 @ 07:00  --------------------------------------------------------  IN: 460 mL / OUT: 450 mL / NET: 10 mL        PHYSICAL EXAM:  GENERAL: NAD  HEAD:  Atraumatic, Normocephalic  EYES: EOMI, PERRLA, conjunctiva and sclera clear  ENMT: No tonsillar erythema, exudates, or enlargement  NECK: Supple, No JVD, Normal thyroid  NERVOUS SYSTEM:  Alert & Oriented X3, Good concentration; Non-focal- Motor Strength 5/5 B/L upper and lower extremities;  CHEST/LUNG: Clear to ascultation bilaterally; No rales, rhonchi, wheezing,   HEART: Regular rate and rhythm; No murmurs, rubs, or gallops  ABDOMEN: Soft, Nontender, Nondistended; Bowel sounds present  EXTREMITIES: Let Lat hip wound, dressing intact, edema , no erthema     Consultant(s) Notes Reviewed:  [x ] YES  [ ] NO  Care Discussed with Consultants/Other Providers/ Housestaff [ x] YES  [ ] NO    LABS:                        7.7    11.39 )-----------( 128      ( 12 May 2019 07:00 )             22.2       Hemoglobin: 7.7 g/dL (05-12 @ 07:00)  Hemoglobin: 9.4 g/dL (05-11 @ 17:22)  Hemoglobin: 9.6 g/dL (05-11 @ 04:30)  Hemoglobin: 9.8 g/dL (05-10 @ 23:30)  Hemoglobin: 7.3 g/dL (05-10 @ 06:08)    05-12    144  |  108  |  40<H>  ----------------------------<  138<H>  4.6   |  24  |  2.1<H>      Creatinine: 2.1 (05-12 @ 07:00)    Creatinine: 1.8 (05-11 @ 04:30)    Creatinine: 1.7 (05-10 @ 06:08)    Creatinine: 1.5 (05-09 @ 11:50)    Creatinine: 1.7 (05-08 @ 21:45)        Ca    7.9<L>      12 May 2019 07:00  Phos  5.5     05-12  Mg     2.3     05-12    TPro  4.9<L>  /  Alb  2.7<L>  /  TBili  0.4  /  DBili  x   /  AST  23  /  ALT  13  /  AlkPhos  41  05-12          RADIOLOGY & ADDITIONAL TESTS:    Imaging or report Personally Reviewed:  [ ] YES  [ ] NO    Case discussed with resident    Care discussed with pt/family      HEALTH ISSUES - PROBLEM Dx:

## 2019-05-12 NOTE — PHYSICAL THERAPY INITIAL EVALUATION ADULT - GENERAL OBSERVATIONS, REHAB EVAL
Pt encountered semi-way in bed with +O2 2L/min via NC, +melo, +sequentials, IV lock, in NAD with family present. Pt left same as found +O2 2L/min via NC, +melo, +sequentials, IV lock, in NAD with family present. KEREN Matamoros made aware.

## 2019-05-13 NOTE — PROGRESS NOTE ADULT - SUBJECTIVE AND OBJECTIVE BOX
Patient seen and examined during AM rounds. Resting comfortably, no active complaints. Denies fevers, chills, SOB.    Vital Signs Last 24 Hrs  T(C): 37.2 (13 May 2019 07:30), Max: 37.2 (13 May 2019 07:30)  T(F): 99 (13 May 2019 07:30), Max: 99 (13 May 2019 07:30)  HR: 88 (13 May 2019 07:30) (88 - 99)  BP: 122/60 (13 May 2019 07:30) (120/54 - 150/60)  BP(mean): --  RR: 18 (13 May 2019 07:30) (18 - 18)  SpO2: 98% (12 May 2019 15:41) (98% - 98%)      PE :  LLE :  Dressings with minimal serosang drainage  Thigh and calf soft and compressible  EHL TA GS motor intact  SILT distally  Foot warm and perfused                          7.4    10.53 )-----------( 130      ( 13 May 2019 06:03 )             21.0       A/P  89F POD2 s/p L Femur IMN:  - Pain control  - Monitor H/H  - DVT PPX: Can resume  - IS  - TTWB LLE  - PT OT OOBTC  - Medical management per primary team  - Dressing change tomorrow  - Ortho to continue following

## 2019-05-13 NOTE — PROGRESS NOTE ADULT - SUBJECTIVE AND OBJECTIVE BOX
ESSIE ARMENDARIZ 89y Female  MRN#: [id number]   CODE STATUS       SUBJECTIVE  Patient is a 89y old Female who presents with a chief complaint of Hip fracture (13 May 2019 08:58)  Currently admitted to medicine with the primary diagnosis of Subtrochanteric fracture of left femur  Hospital course has been complicated by undergoing surgery for left femur fracture with IMN and requiring PRBC transfusions for drop in H/H.  Today is hospital day 5d, and this morning she is complaining of pain in her left leg. She denied hx of any discharge from the wound, fever, cough, SOB, nausea, vomiting, diarrhea or constipation.    OBJECTIVE  PAST MEDICAL & SURGICAL HISTORY  PVD (peripheral vascular disease)  Chronic kidney disease (CKD)  CAD (coronary artery disease)  S/P cholecystectomy    ALLERGIES:  No Known Allergies    MEDICATIONS:  STANDING MEDICATIONS  amLODIPine   Tablet 10 milliGRAM(s) Oral daily  atorvastatin 20 milliGRAM(s) Oral at bedtime  heparin  Injectable 5000 Unit(s) SubCutaneous every 8 hours  sodium chloride 0.9%. 1000 milliLiter(s) IV Continuous <Continuous>    PRN MEDICATIONS  morphine  - Injectable 1 milliGRAM(s) IV Push every 10 minutes PRN  morphine  - Injectable 2 milliGRAM(s) IV Push three times a day PRN      VITAL SIGNS: Last 24 Hours  T(C): 37.2 (13 May 2019 07:30), Max: 37.2 (13 May 2019 07:30)  T(F): 99 (13 May 2019 07:30), Max: 99 (13 May 2019 07:30)  HR: 88 (13 May 2019 07:30) (88 - 99)  BP: 122/60 (13 May 2019 07:30) (120/54 - 150/60)  BP(mean): --  RR: 18 (13 May 2019 07:30) (18 - 18)  SpO2: 98% (12 May 2019 15:41) (98% - 98%)    LABS:                        7.4    10.53 )-----------( 130      ( 13 May 2019 06:03 )             21.0     05-13    141  |  107  |  50<H>  ----------------------------<  114<H>  4.3   |  22  |  2.2<H>    Ca    7.4<L>      13 May 2019 06:03  Phos  5.5     05-12  Mg     2.4     05-13    TPro  4.6<L>  /  Alb  2.5<L>  /  TBili  0.6  /  DBili  x   /  AST  18  /  ALT  5   /  AlkPhos  35  05-13                  RADIOLOGY:  < from: CT Femur No Cont, Left (05.09.19 @ 20:42) >  IMPRESSION:    Diffuse osteopenia.    Acute left proximal femoral shaft comminuted subtrochanteric fractures   extending into the lesser trochanteric demonstrating 2.5 cm bony   override, near entire femoral shaft width posterior displacement, and 90   degree external rotation of the distal femoral shaft.    3 cm anterior displacement of the avulsed lesser trochanter.    < end of copied text >      PHYSICAL EXAM:    GENERAL: NAD, AAOx2  HEENT:  Atraumatic, Normocephalic. EOMI, PERRLA, conjunctiva and sclera clear, No JVD  PULMONARY: Clear to auscultation bilaterally; No wheeze  CARDIOVASCULAR: Regular rate and rhythm; No murmurs, rubs, or gallops  GASTROINTESTINAL: Soft, Nontender, Nondistended; Bowel sounds present  MUSCULOSKELETAL:  2+ Peripheral Pulses, No clubbing, cyanosis, or edema  NEUROLOGY: non-focal  SKIN: No rashes or lesions

## 2019-05-13 NOTE — PROGRESS NOTE ADULT - ASSESSMENT
89 yo F with hx of CAD with multiple stents, peripheral vascular disease, and stage 4 CKD presents after a mechanical fall with a left hip subtronchanteric comminuted fracture, she was cleared by medicine and cardiology for orthopedic surgery.     ASSESSMENT & PLAN    # Left subtrochanteric comminuted fracture  - s/p Left Femur IMN, POD#2   - Pain control with morphine 2 mg IV TID PRN   - TTWB LLE, OOBTC, PT/Rehab.  - DVT ppx resumed as per ortho  - pt has slight swelling left thigh but no signs of compartment syndrome.    # CAD with multiple stents  - was on Plavix 75 mg daily at home, switched to ASA in hospital for surgery. Currently off ASA and Plavix from drop in H/H.   - 2D Echo: LVEF 65% / grade 1 diastolic dysfunction / mild aortic stenosis  - will resume ASA and switch to Plavix once stable.    # Hypertension  - c/w amlodipine 10mg q24 home dose.    # Peripheral vascular disease  - Cilostazol 100 BID at home, will resume once H/H is stable.  - C/w Lipitor 20 mg daily    # Iron deficiency anemia and possible post-op bleeding  - Hgb 7.4 today from 8.1 5/12 (s/p 1 unit PRBC 5/12)  - received 3 units PRBCs so far.  - Iron studies suggest deficiency c/w supplemental iron.   - maintain active type and screen, monitor H/H q12.    # GI ppx  - Diet: DASH diet post-op    # DVT ppx  - Hep subQ 89 yo F with hx of CAD with multiple stents, peripheral vascular disease, and stage 4 CKD presents after a mechanical fall with a left hip subtronchanteric comminuted fracture, she was cleared by medicine and cardiology for orthopedic surgery.     ASSESSMENT & PLAN    # Left subtrochanteric comminuted fracture  - s/p Left Femur IMN, POD#2   - Pain control with morphine 2 mg IV TID PRN   - TTWB LLE, OOBTC, PT/Rehab.  - DVT ppx resumed as per ortho  - pt has slight swelling left thigh but no signs of compartment syndrome.    # CAD with multiple stents  - was on Plavix 75 mg daily at home, switched to ASA in hospital for surgery. Currently off ASA and Plavix from drop in H/H.   - 2D Echo: LVEF 65% / grade 1 diastolic dysfunction / mild aortic stenosis  - will resume ASA and switch to Plavix once stable.    # MARILU on CKD IV  - creatinine 2.2 today from 1.6 at baseline  - most likely pre-renal from drop in hemoglobin  - will check UA, urine lytes and US KUB.    # Hypertension  - c/w amlodipine 10mg q24 home dose.    # Peripheral vascular disease  - Cilostazol 100 BID at home, will resume once H/H is stable.  - C/w Lipitor 20 mg daily    # Iron deficiency anemia and possible post-op bleeding  - Hgb 7.4 today from 8.1 5/12 (s/p 1 unit PRBC 5/12)  - received 3 units PRBCs so far.  - Iron studies suggest deficiency c/w supplemental iron.   - maintain active type and screen, monitor H/H q12.    # GI ppx  - Diet: DASH diet post-op    # DVT ppx  - Hep subQ     # Dispo  - pt will go to SNF once cleared by Ortho and H/H stabilizes.

## 2019-05-13 NOTE — PROGRESS NOTE ADULT - ATTENDING COMMENTS
Orthopedic Attending    Patient seen and examined with resident/PA.  Any new laboratory work-up and consults reviewed.  Any new radiographs were reviewed.   Agree with findings, assessment and plan.

## 2019-05-13 NOTE — PROGRESS NOTE ADULT - SUBJECTIVE AND OBJECTIVE BOX
CHIEF COMPLAINT:    Patient is a 89y old  Female who presents with a chief complaint of Hip fracture     INTERVAL HPI/OVERNIGHT EVENTS:    Patient seen and examined at bedside. No acute overnight events occurred.    ROS: Mild left hip pain. All other systems are negative.    Vital Signs:    T(F): 99 (05-13-19 @ 07:30), Max: 99 (05-13-19 @ 07:30)  HR: 88 (05-13-19 @ 07:30) (88 - 99)  BP: 122/60 (05-13-19 @ 07:30) (120/54 - 150/60)  RR: 18 (05-13-19 @ 07:30) (18 - 18)  SpO2: 98% (05-12-19 @ 15:41) (98% - 98%)  I&O's Summary    12 May 2019 07:01  -  13 May 2019 07:00  --------------------------------------------------------  IN: 760 mL / OUT: 450 mL / NET: 310 mL    13 May 2019 07:01  -  13 May 2019 12:52  --------------------------------------------------------  IN: 240 mL / OUT: 0 mL / NET: 240 mL    PHYSICAL EXAM:  GENERAL:  NAD  SKIN: No rashes or lesions  HEENT: Atraumatic. Normocephalic. Anicteric  NECK:  No JVD.   PULMONARY: Clear to ausculation bilaterally. No wheezing. No rales  CVS: Normal S1, S2. Regular rate and rhythm. No murmurs.  ABDOMEN/GI: Soft, Nontender, Nondistended; Bowel sounds are present  EXTREMITIES:  Left thigh swollen, soft  NEUROLOGIC:  No motor deficit.  PSYCH: Alert & oriented x 3, normal affect    Consultant(s) Notes Reviewed:  [x ] YES  [ ] NO    LABS:                        7.4    10.53 )-----------( 130      ( 13 May 2019 06:03 )             21.0     05-13    141  |  107  |  50<H>  ----------------------------<  114<H>  4.3   |  22  |  2.2<H>    Ca    7.4<L>      13 May 2019 06:03  Phos  5.5     05-12  Mg     2.4     05-13    TPro  4.6<L>  /  Alb  2.5<L>  /  TBili  0.6  /  DBili  x   /  AST  18  /  ALT  5   /  AlkPhos  35  05-13    RADIOLOGY & ADDITIONAL TESTS:  No new images    Medications:  Standing  amLODIPine   Tablet 10 milliGRAM(s) Oral daily  aspirin  chewable 81 milliGRAM(s) Oral daily  atorvastatin 20 milliGRAM(s) Oral at bedtime  ferrous    sulfate 325 milliGRAM(s) Oral daily  heparin  Injectable 5000 Unit(s) SubCutaneous every 8 hours  sodium chloride 0.9%. 1000 milliLiter(s) IV Continuous <Continuous>    PRN Meds  morphine  - Injectable 1 milliGRAM(s) IV Push every 10 minutes PRN  morphine  - Injectable 2 milliGRAM(s) IV Push three times a day PRN      Case discussed with resident  Care discussed with pt

## 2019-05-13 NOTE — PROGRESS NOTE ADULT - ASSESSMENT
89 yo F presents s/p mechanical fall resulting in left subtrochanteric fracture    Left hip subtrochanteric fracture  -s/p IMN   -TTWB  - pt/rehab  -pain control  -dvt proph    Acute on chronic anemia   - likely acute blood loss anemia   - responded appropriately to transfusion preop  -now anemic again  -LLE swelling, concerned for possible bleed into extremity  -continue to hold plavix  -transfuse if hgb <8 given cardiac history  -s/p 3 U prbc    MARILU on CKD IV    -cont to monitor BMP   -worsening  -check FeNa  - increase fluids    CAD with PCI   -continue to hold plavix due to bleed  -will likely have to hold aspirin too  -restart carvedilol    Peripheral vascular disease  - takes Cilostazol 100 BID at home, can restart  - restart Lipitor 20 mg daily      #Progress Note Handoff:  Pending (specify):  transfusion, hgb stablization  Family discussion: d/w pt at bedside  Disposition: Home___/SNF__x_/Other________/Unknown at this time________

## 2019-05-14 NOTE — PROGRESS NOTE ADULT - ATTENDING COMMENTS
Orthopedic Attending    Patient seen and examined with resident/PA.  Any new laboratory work-up and consults reviewed.  Any new radiographs were reviewed.   Agree with findings, assessment and plan. Orthopedic Attending    Patient seen and examined with resident/PA.  Any new laboratory work-up and consults reviewed.  Any new radiographs were reviewed.   Agree with findings, assessment and plan.  Dropping Hgb most likely related to fracture and equilibrization.  Would not get CT to document hematoma, as there is not likely much that can be done.  In time hematoma will stabilize.  Fracture is adequately reduced and stabilized.

## 2019-05-14 NOTE — PROGRESS NOTE ADULT - ASSESSMENT
89 yo F with hx of CAD with multiple stents, peripheral vascular disease, and stage 4 CKD presents after a mechanical fall with a left hip subtrochanteric comminuted fracture, she underwent surgery with left Femur IMN.      # Fever  - no leucocytosis or left shift.   - pt only complaining of increased urine freq but UA negative. No hx of cough or diarrhea.  - she has redness in left arm at the site of I/V with warmth and minimal swelling.  - repeat CXR did not show any cardio-pulmonary pathology.  - pending repeat blood cultures. Will check duplex LE.  - continue to monitor without ABx.     # Iron deficiency anemia and possible post-op bleeding  - Hgb 6.1 today from 7.4 yesterday 5/13. Will transfuse 2 units today.  - received 3 units PRBCs during this admission.  - increased swelling of LLE.  - Iron studies suggest deficiency c/w supplemental iron.   - maintain active type and screen, monitor H/H q12.   - Transfuse to target Hb of 8 as pt has CAD.  - continue to hold ASA / PLAVIX / DVT ppx.    # Left subtrochanteric comminuted fracture  - s/p Left Femur IMN, POD#3  - Pain control with morphine 2 mg IV TID PRN   - TTWB LLE, OOBTC.  - DVT ppx on hold in light of drop in Hb.    # CAD with multiple stents  - was on Plavix 75 mg daily at home, switched to ASA in hospital for surgery. Currently off ASA and Plavix from drop in H/H.   - 2D Echo: LVEF 65% / grade 1 diastolic dysfunction / mild aortic stenosis  - will resume ASA or Plavix once Hb stable.    # MARILU on CKD IV improving.   - creatinine 1.4 today from 2.2 yesterday.  - most likely pre-renal from drop in hemoglobin responded to fluids, c/w IVF as pt is not eating properly.  - US KUB unremarkable.    # Hypertension  - c/w amlodipine 10mg q24 home dose.    # Peripheral vascular disease  - Cilostazol 100 BID at home, will resume once H/H is stable.  - C/w Lipitor 20 mg daily    # GI ppx  - Diet: DASH diet post-op    # DVT ppx  - SCDs for now.    # Dispo  - pt will go to SNF once cleared by Ortho and H/H stabilizes. 89 yo F with hx of CAD with multiple stents, peripheral vascular disease, and stage 4 CKD presents after a mechanical fall with a left hip subtrochanteric comminuted fracture, she underwent surgery with left Femur IMN.      # Fever  - no leucocytosis or left shift.   - pt only complaining of increased urine freq but UA negative. No hx of cough or diarrhea.  - she has redness in left arm at the site of I/V with warmth and minimal swelling.  - repeat CXR did not show any cardio-pulmonary pathology.  - pending repeat blood cultures. Will check duplex LE.  - continue to monitor without ABx.     # Iron deficiency anemia and possible post-op bleeding  - Hgb 6.1 today from 7.4 yesterday 5/13. Will transfuse 2 units today.  - received 3 units PRBCs during this admission.  - increased swelling of LLE.  - Iron studies suggest deficiency c/w supplemental iron.   - maintain active type and screen, monitor H/H q12.   - Transfuse to target Hb of 8 as pt has CAD.  - continue to hold ASA / PLAVIX / DVT ppx.  - no need for repeat imaging of LLE as per ortho.    # Left subtrochanteric comminuted fracture  - s/p Left Femur IMN, POD#3  - Pain control with morphine 2 mg IV TID PRN   - TTWB LLE, OOBTC.  - DVT ppx on hold in light of drop in Hb.    # CAD with multiple stents  - was on Plavix 75 mg daily at home, switched to ASA in hospital for surgery. Currently off ASA and Plavix from drop in H/H.   - 2D Echo: LVEF 65% / grade 1 diastolic dysfunction / mild aortic stenosis  - will resume ASA or Plavix once Hb stable.    # MARILU on CKD IV improving.   - creatinine 1.4 today from 2.2 yesterday.  - most likely pre-renal from drop in hemoglobin responded to fluids, c/w IVF as pt is not eating properly.  - US KUB unremarkable.    # Hypophosphatemia  - repleted will check again tomorrow.    # Hypertension  - c/w amlodipine 10mg q24 home dose.    # Peripheral vascular disease  - Cilostazol 100 BID at home, will resume once H/H is stable.  - C/w Lipitor 20 mg daily    # GI ppx  - Diet: DASH diet post-op    # DVT ppx  - SCDs for now.    # Dispo  - pt will go to SNF once cleared by Ortho and H/H stabilizes. 89 yo F with hx of CAD with multiple stents, peripheral vascular disease, and stage 3 CKD presents after a mechanical fall with a left hip subtrochanteric comminuted fracture, she underwent surgery with left Femur IMN.    # Fever  - no leucocytosis or left shift.   - pt only complaining of increased urine freq but UA negative. No hx of cough or diarrhea.  - left AC, firm cord like lesion with erythema, no tenderness, possibly phlebitis   - repeat CXR did not show any cardio-pulmonary pathology.  - pending repeat blood cultures. Will check duplex LE.  - continue to monitor without ABx.     # Acute on chronic anemia, likely acute blood loss anemia from surgery   - Hgb 6.1 today from 7.4 yesterday 5/13. Will transfuse 2 units today.  - received 3 units PRBCs during this admission.  - increased swelling of LLE.  - Iron studies suggest deficiency c/w supplemental iron.   - maintain active type and screen, monitor H/H q12.   - Transfuse to target Hb of 8 as pt has CAD.  - continue to hold ASA / PLAVIX / DVT ppx.  - start PPI, check stool guaiac, stool dark possibly from ferrous sulfate   - no need for repeat imaging of left hip as per ortho.    # Left subtrochanteric comminuted fracture  - s/p Left Femur IMN, POD#3  - Pain control with morphine 2 mg IV TID PRN   - TTWB LLE, OOBTC.  - DVT ppx on hold in light of drop in Hb.    # CAD with multiple stents  - was on Plavix 75 mg daily at home, switched to ASA in hospital for surgery. Currently off ASA and Plavix from drop in H/H.   - 2D Echo: LVEF 65% / grade 1 diastolic dysfunction / mild aortic stenosis  - will resume ASA or Plavix once Hb stable.    # MARILU on CKD III improving.   - creatinine 1.4 today from 2.2 yesterday.  - most likely pre-renal from drop in hemoglobin responded to fluids, c/w IVF as pt is not eating properly.  - US KUB unremarkable.     # Hypophosphatemia  - replated will check again tomorrow.     # Hypertension  - c/w amlodipine 10mg q24 home dose.    # Peripheral vascular disease  - Cilostazol 100 BID at home, will resume once H/H is stable.  - C/w Lipitor 20 mg daily    # GI ppx  - Diet: DASH diet post-op    # DVT ppx  - SCDs for now.    # Dispo  - pt will go to SNF once H/H stabilizes.

## 2019-05-14 NOTE — PROGRESS NOTE ADULT - SUBJECTIVE AND OBJECTIVE BOX
ORTHO PROGRESS NOTE       89yFemale POD #   3   S/P orif left subtroch femur fx     Patient seen and examined at bedside . The patient is awake and alert in NAD. No complaints of chest pain, SOB, N/V.    Vital Signs Last 24 Hrs  T(C): 38 (14 May 2019 07:42), Max: 38 (14 May 2019 07:42)  T(F): 100.4 (14 May 2019 07:42), Max: 100.4 (14 May 2019 07:42)  HR: 91 (14 May 2019 07:42) (91 - 98)  BP: 119/56 (14 May 2019 07:42) (119/56 - 140/66)  BP(mean): --  RR: 18 (14 May 2019 07:42) (18 - 18)  SpO2: --                          7.5    9.94  )-----------( 170      ( 13 May 2019 18:49 )             22.0     05-13    141  |  107  |  50<H>  ----------------------------<  114<H>  4.3   |  22  |  2.2<H>    Ca    7.4<L>      13 May 2019 06:03  Mg     2.4     05-13    TPro  4.6<L>  /  Alb  2.5<L>  /  TBili  0.6  /  DBili  x   /  AST  18  /  ALT  5   /  AlkPhos  35  05-13    PT/INR - ( 13 May 2019 18:49 )   PT: 11.40 sec;   INR: 0.99 ratio           Urinalysis Basic - ( 13 May 2019 21:50 )    Color: Yellow / Appearance: Cloudy / S.020 / pH: x  Gluc: x / Ketone: Negative  / Bili: Negative / Urobili: 1.0 mg/dL   Blood: x / Protein: 30 mg/dL / Nitrite: Negative   Leuk Esterase: Negative / RBC: 1-2 /HPF / WBC x   Sq Epi: x / Non Sq Epi: Moderate /HPF / Bacteria: x        DVT ppx : sqh    MEDICATIONS  (STANDING):  amLODIPine   Tablet 10 milliGRAM(s) Oral daily  atorvastatin 20 milliGRAM(s) Oral at bedtime  carvedilol 6.25 milliGRAM(s) Oral every 12 hours  cilostazol 100 milliGRAM(s) Oral every 12 hours  ferrous    sulfate 325 milliGRAM(s) Oral daily  heparin  Injectable 5000 Unit(s) SubCutaneous every 8 hours  sodium chloride 0.9%. 1000 milliLiter(s) (75 mL/Hr) IV Continuous <Continuous>    MEDICATIONS  (PRN):  morphine  - Injectable 1 milliGRAM(s) IV Push every 10 minutes PRN Mild Pain (1 - 3)  morphine  - Injectable 2 milliGRAM(s) IV Push three times a day PRN Severe Pain (7 - 10)      PE:   left hip with serosanguinous drainage proximally, dsd applied          Compartments soft         NVI, SILT           A/P: 89yFemale POD #  3  S/P  orif left subtroch femur fx             OOB to Chair            Physical Therapy           Pain control            Incentive Spirometry            DVT Prophylaxis            Discharge planning

## 2019-05-14 NOTE — PROGRESS NOTE ADULT - ATTENDING COMMENTS
Agree with resident's note above. Changes made within body of text.     #Progress Note Handoff  Pending (specify):  Consults_________, Tests________, Test Results_______, Other___acute anemia______  Family discussion: chip pt and agreed with plan   Disposition: Home___/SNF__x_/Other________/Unknown at this time________

## 2019-05-14 NOTE — PROGRESS NOTE ADULT - SUBJECTIVE AND OBJECTIVE BOX
ESSIE ARMENDARIZ 89y Female  MRN#: [id number]   CODE STATUS:     SUBJECTIVE  Patient is a 89y old Female who presents with a chief complaint of Hip fracture (14 May 2019 07:55)  Currently admitted to medicine with the primary diagnosis of Subtrochanteric fracture of left femur  Hospital course has been complicated by undergoing surgery for left femur fracture with IMN and requiring PRBC transfusions for drop in H/H.  Today is hospital day 6d, and this morning she is complaining of pain in her left leg. She denied hx of any discharge from the wound, fever, cough, SOB, nausea, vomiting, diarrhea or constipation.  She dropped her hemoglobin from 7.4 to 6.1 this morning without any obvious signs or symptoms of bleeding. She is also febrile with T max of 101.3.      OBJECTIVE  PAST MEDICAL & SURGICAL HISTORY  PVD (peripheral vascular disease)  Chronic kidney disease (CKD)  CAD (coronary artery disease)  S/P cholecystectomy    ALLERGIES:  No Known Allergies    MEDICATIONS:  STANDING MEDICATIONS  amLODIPine   Tablet 10 milliGRAM(s) Oral daily  atorvastatin 20 milliGRAM(s) Oral at bedtime  carvedilol 6.25 milliGRAM(s) Oral every 12 hours  chlorhexidine 4% Liquid 1 Application(s) Topical <User Schedule>  ferrous    sulfate 325 milliGRAM(s) Oral daily  potassium phosphate / sodium phosphate powder 1 Packet(s) Oral two times a day  sodium chloride 0.9%. 1000 milliLiter(s) IV Continuous <Continuous>    PRN MEDICATIONS  acetaminophen   Tablet .. 650 milliGRAM(s) Oral every 6 hours PRN  morphine  - Injectable 1 milliGRAM(s) IV Push every 10 minutes PRN  morphine  - Injectable 2 milliGRAM(s) IV Push three times a day PRN      VITAL SIGNS: Last 24 Hours  T(C): 37.6 (14 May 2019 15:03), Max: 38.5 (14 May 2019 10:03)  T(F): 99.6 (14 May 2019 15:03), Max: 101.3 (14 May 2019 10:03)  HR: 93 (14 May 2019 15:03) (91 - 99)  BP: 129/53 (14 May 2019 15:03) (115/57 - 140/66)  BP(mean): --  RR: 18 (14 May 2019 15:03) (18 - 18)  SpO2: --    LABS:                        6.3    9.31  )-----------( 153      ( 14 May 2019 11:25 )             18.4     -14    142  |  111<H>  |  42<H>  ----------------------------<  114<H>  3.8   |  22  |  1.4    Ca    7.2<L>      14 May 2019 06:19  Phos  1.9       Mg     2.3         TPro  4.4<L>  /  Alb  2.3<L>  /  TBili  0.8  /  DBili  x   /  AST  24  /  ALT  6   /  AlkPhos  37      PT/INR - ( 13 May 2019 18:49 )   PT: 11.40 sec;   INR: 0.99 ratio           Urinalysis Basic - ( 13 May 2019 21:50 )    Color: Yellow / Appearance: Cloudy / S.020 / pH: x  Gluc: x / Ketone: Negative  / Bili: Negative / Urobili: 1.0 mg/dL   Blood: x / Protein: 30 mg/dL / Nitrite: Negative   Leuk Esterase: Negative / RBC: 1-2 /HPF / WBC x   Sq Epi: x / Non Sq Epi: Moderate /HPF / Bacteria: x                RADIOLOGY:  < from: Xray Chest 1 View- PORTABLE-Routine (19 @ 12:57) >  Impression:      No radiographic evidence of acute cardiopulmonary disease.    < end of copied text >  < from: US Kidney and Bladder (19 @ 10:02) >  Impression:    Limited evaluation the kidneys.    Normal urinary bladder.    < end of copied text >      PHYSICAL EXAM:    GENERAL: NAD, AAOx3  HEENT:  Atraumatic, Normocephalic. EOMI, PERRLA, conjunctiva and sclera clear, No JVD  PULMONARY: Clear to auscultation bilaterally; No wheeze  CARDIOVASCULAR: Regular rate and rhythm; systolic murmur present no rubs, or gallops  GASTROINTESTINAL: Soft, Nontender, Nondistended; Bowel sounds present  MUSCULOSKELETAL:  2+ Peripheral Pulses, No clubbing, cyanosis. Left leg is swollen with warmth and tenderness.  NEUROLOGY: non-focal  SKIN: No rashes or lesions

## 2019-05-15 NOTE — PROGRESS NOTE ADULT - ASSESSMENT
89 yo F with hx of CAD with multiple stents, peripheral vascular disease, and stage 3 CKD presents after a mechanical fall with a left hip subtrochanteric comminuted fracture, she underwent surgery with left Femur IMN.    # Fever most likely from thrombophlebitis left arm.  - no leucocytosis or left shift.   - pt only complaining of increased urine freq but UA negative. No hx of cough or diarrhea.  - left AC, firm cord like lesion with erythema, no tenderness, prelim duplex report showed cephalic vein thrombophlebitis- awaiting final report.  - repeat CXR did not show any cardio-pulmonary pathology.  - pending repeat blood cultures. duplex LE -ve.  - continue to monitor without ABx, warm compresses and arm elevation.    # Acute on chronic anemia, likely acute blood loss anemia post - op  - Hgb 8.6 today from 8.8 yesterday 5/14.   - received 5 units PRBCs during this admission.  - increased swelling of LLE.  - Iron studies suggest deficiency c/w supplemental iron.   - maintain active type and screen, monitor H/H q12.   - Transfuse to target Hb of 8 as pt has CAD.  - continue to hold ASA / PLAVIX / DVT ppx.  - no need for repeat imaging of left hip as per ortho.    # Left subtrochanteric comminuted fracture  - s/p Left Femur IMN, POD#4  - Pain control with morphine 2 mg IV TID PRN   - TTWB LLE, OOBTC.  - DVT ppx on hold in light of drop in Hb.     # CAD with multiple stents  - was on Plavix 75 mg daily at home, switched to ASA in hospital for surgery. Currently off ASA and Plavix from drop in H/H.   - 2D Echo: LVEF 65% / grade 1 diastolic dysfunction / mild aortic stenosis  - will resume ASA or Plavix once Hb stable.    # MARILU on CKD III- resolved  - creatinine 1.4 today from 2.2  - most likely pre-renal from drop in hemoglobin responded to fluids, c/w IVF as pt is not eating properly.  - US KUB neg for hydro.     # Hypertension  - c/w amlodipine 10mg q24 home dose.    # Peripheral vascular disease  - was on Cilostazol 100 BID at home, currently on hold will resume once H/H is stable.  - C/w Lipitor 20 mg daily    # GI ppx  - Diet: DASH diet post-op    # DVT ppx  - SCDs for now.    # Dispo  - pt will go to SNF once H/H stabilizes.

## 2019-05-15 NOTE — PROGRESS NOTE ADULT - ATTENDING COMMENTS
Agree with resident's note, HPI, PE, assessment and plan.  Pt was seen and examined independently.    89 yo F with hx of CAD with multiple stents, peripheral vascular disease, and stage 3 CKD presented after mechanical fall with a left hip subtrochanteric comminuted fracture, she underwent surgery with left Femur IMN 5/11, hospital course got complicated by acute blood loss anemia and fever.  Today pt c/o pain to the site of surgery which is well controlled by pain meds. No other complaints.    Vitals reviewed, low grade fever, T mx 100.4    Physical exam:  NAD, not toxic looking, sitting in chair  NECK: supple, no JVD  RESP: CTA b/l, no crackles or rhonchi  CVS: S1S2, RRR  GI: abdomen soft NT, ND  Extremities: Left LE swollen from thigh to the ankle, thigh is soft, tender only to deep palpation, no discharge from post op wound noted, no warmth, no calf tenderness b/l. LUE post AIV line cord-like induration  NEURO: AOx3, no focal deficit    labs reviewed: Hemoglobin: 8.6 (05-15)  Hemoglobin: 8.8 (05-14)  Hemoglobin: 6.3 (05-14)    WBC Count: 9.01 K/uL (05-15)  Bands 0.9, +toxic granulation    Creatinine, Serum: 1.4 (05-15)  Creatinine, Serum: 1.4 (05-14)  Creatinine, Serum: 2.2 (05-13)    A/P: # Left femur fracture, s/p IMN 5/11.  - WBAT, out of bed to chair, PT  - incentive spirometry  - pain controlled with Tylenol 650 mg Q 8 hrs PRN and Morphine PRN  # Acute blood loss anemia postop and likely hematoma - thigh hematoma does not look worse, Hb stable after transfusion 5/14, no other signs of bleeding, continue to monitor Hb Q24hrs.  # Fever, might be due to hematoma itself, UA negative, CXR negative, BCx pending, US duplex LE and UE (to r/o line related thrombophlebitis) pending, no ABx for now, continue to  monitor.  # CAD - cont home meds except ASA, on hold due to low Hb  # CKD3  # PAD - resume cilostazol once Hb stable   # HTN - cont current management    DVT ppx on hold due to low Hb    #Progress Note Handoff  Pending  Consults: none  Tests: US duplex  Test Results: Hb in AM  Family Discussion: not needed  Future Disposition: SNF

## 2019-05-15 NOTE — PROGRESS NOTE ADULT - SUBJECTIVE AND OBJECTIVE BOX
ESSIE ARMENDARIZ 89y Female  MRN#: [id number]   CODE STATUS:     SUBJECTIVE  Patient is a 89y old Female who presents with a chief complaint of Hip fracture (14 May 2019 15:28)  Currently admitted to medicine with the primary diagnosis of Subtrochanteric fracture of left femur  Hospital course has been complicated by undergoing surgery for left femur fracture with IMN and requiring PRBC transfusions for drop in H/H.  Today is hospital day 7d, and this morning she is complaining of pain in her left leg which is improving. She denied hx of any discharge from the wound, fever, cough, SOB, nausea, vomiting, diarrhea or constipation.   She is still febrile with T max of 100.4.    OBJECTIVE  PAST MEDICAL & SURGICAL HISTORY  PVD (peripheral vascular disease)  Chronic kidney disease (CKD)  CAD (coronary artery disease)  S/P cholecystectomy    ALLERGIES:  No Known Allergies    MEDICATIONS:  STANDING MEDICATIONS  amLODIPine   Tablet 10 milliGRAM(s) Oral daily  atorvastatin 20 milliGRAM(s) Oral at bedtime  carvedilol 6.25 milliGRAM(s) Oral every 12 hours  chlorhexidine 4% Liquid 1 Application(s) Topical <User Schedule>  ferrous    sulfate 325 milliGRAM(s) Oral daily  pantoprazole    Tablet 40 milliGRAM(s) Oral before breakfast  potassium phosphate / sodium phosphate powder 1 Packet(s) Oral two times a day  sodium chloride 0.9%. 1000 milliLiter(s) IV Continuous <Continuous>    PRN MEDICATIONS  acetaminophen   Tablet .. 650 milliGRAM(s) Oral every 6 hours PRN  morphine  - Injectable 1 milliGRAM(s) IV Push every 10 minutes PRN  morphine  - Injectable 2 milliGRAM(s) IV Push three times a day PRN      VITAL SIGNS: Last 24 Hours  T(C): 37.4 (15 May 2019 07:27), Max: 38 (15 May 2019 00:00)  T(F): 99.4 (15 May 2019 07:27), Max: 100.4 (15 May 2019 00:00)  HR: 83 (15 May 2019 07:27) (83 - 93)  BP: 119/58 (15 May 2019 07:27) (119/58 - 129/57)  BP(mean): --  RR: 18 (15 May 2019 07:27) (18 - 18)  SpO2: --    LABS:                        8.6    9.01  )-----------( 163      ( 15 May 2019 08:11 )             25.6     05-15    144  |  111<H>  |  33<H>  ----------------------------<  98  4.0   |  23  |  1.4    Ca    7.6<L>      15 May 2019 08:11  Phos  2.2     05-15  Mg     2.2     -15    TPro  4.7<L>  /  Alb  2.5<L>  /  TBili  1.5<H>  /  DBili  x   /  AST  25  /  ALT  9   /  AlkPhos  38  05-15    PT/INR - ( 13 May 2019 18:49 )   PT: 11.40 sec;   INR: 0.99 ratio           Urinalysis Basic - ( 13 May 2019 21:50 )    Color: Yellow / Appearance: Cloudy / S.020 / pH: x  Gluc: x / Ketone: Negative  / Bili: Negative / Urobili: 1.0 mg/dL   Blood: x / Protein: 30 mg/dL / Nitrite: Negative   Leuk Esterase: Negative / RBC: 1-2 /HPF / WBC x   Sq Epi: x / Non Sq Epi: Moderate /HPF / Bacteria: x                RADIOLOGY:  < from: Xray Chest 1 View- PORTABLE-Routine (19 @ 12:57) >  Impression:      No radiographic evidence of acute cardiopulmonary disease.    < end of copied text >  < from: US Kidney and Bladder (19 @ 10:02) >  Impression:    Limited evaluation the kidneys.    Normal urinary bladder.    < end of copied text >      PHYSICAL EXAM:    GENERAL: NAD, AAOx3  HEENT:  Atraumatic, Normocephalic. EOMI, PERRLA, conjunctiva and sclera clear, No JVD  PULMONARY: Clear to auscultation bilaterally; No wheeze  CARDIOVASCULAR: Regular rate and rhythm; systolic murmur present no rubs, or gallops  GASTROINTESTINAL: Soft, Nontender, Nondistended; Bowel sounds present  MUSCULOSKELETAL:  2+ Peripheral Pulses, No clubbing, cyanosis. Left leg is swollen with warmth and tenderness.  NEUROLOGY: non-focal  SKIN: No rashes or lesions ESSIE ARMENDARIZ 89y Female  MRN#: [id number]   CODE STATUS:     SUBJECTIVE  Patient is a 89y old Female who presents with a chief complaint of Hip fracture (14 May 2019 15:28)  Currently admitted to medicine with the primary diagnosis of Subtrochanteric fracture of left femur  Hospital course has been complicated by undergoing surgery for left femur fracture with IMN and requiring PRBC transfusions for drop in H/H.  Today is hospital day 7d, and this morning she is complaining of pain in her left leg which is improving. She denied hx of any discharge from the wound, fever, cough, SOB, nausea, vomiting, diarrhea or constipation.   She is still febrile with T max of 100.4.    OBJECTIVE  PAST MEDICAL & SURGICAL HISTORY  PVD (peripheral vascular disease)  Chronic kidney disease (CKD)  CAD (coronary artery disease)  S/P cholecystectomy    ALLERGIES:  No Known Allergies    MEDICATIONS:  STANDING MEDICATIONS  amLODIPine   Tablet 10 milliGRAM(s) Oral daily  atorvastatin 20 milliGRAM(s) Oral at bedtime  carvedilol 6.25 milliGRAM(s) Oral every 12 hours  chlorhexidine 4% Liquid 1 Application(s) Topical <User Schedule>  ferrous    sulfate 325 milliGRAM(s) Oral daily  pantoprazole    Tablet 40 milliGRAM(s) Oral before breakfast  potassium phosphate / sodium phosphate powder 1 Packet(s) Oral two times a day  sodium chloride 0.9%. 1000 milliLiter(s) IV Continuous <Continuous>    PRN MEDICATIONS  acetaminophen   Tablet .. 650 milliGRAM(s) Oral every 6 hours PRN  morphine  - Injectable 1 milliGRAM(s) IV Push every 10 minutes PRN  morphine  - Injectable 2 milliGRAM(s) IV Push three times a day PRN      VITAL SIGNS: Last 24 Hours  T(C): 37.4 (15 May 2019 07:27), Max: 38 (15 May 2019 00:00)  T(F): 99.4 (15 May 2019 07:27), Max: 100.4 (15 May 2019 00:00)  HR: 83 (15 May 2019 07:27) (83 - 93)  BP: 119/58 (15 May 2019 07:27) (119/58 - 129/57)  BP(mean): --  RR: 18 (15 May 2019 07:27) (18 - 18)  SpO2: --    LABS:                        8.6    9.01  )-----------( 163      ( 15 May 2019 08:11 )             25.6     05-15    144  |  111<H>  |  33<H>  ----------------------------<  98  4.0   |  23  |  1.4    Ca    7.6<L>      15 May 2019 08:11  Phos  2.2     05-15  Mg     2.2     05-15    TPro  4.7<L>  /  Alb  2.5<L>  /  TBili  1.5<H>  /  DBili  x   /  AST  25  /  ALT  9   /  AlkPhos  38  05-15    PT/INR - ( 13 May 2019 18:49 )   PT: 11.40 sec;   INR: 0.99 ratio           Urinalysis Basic - ( 13 May 2019 21:50 )    Color: Yellow / Appearance: Cloudy / S.020 / pH: x  Gluc: x / Ketone: Negative  / Bili: Negative / Urobili: 1.0 mg/dL   Blood: x / Protein: 30 mg/dL / Nitrite: Negative   Leuk Esterase: Negative / RBC: 1-2 /HPF / WBC x   Sq Epi: x / Non Sq Epi: Moderate /HPF / Bacteria: x                RADIOLOGY:  < from: Xray Chest 1 View- PORTABLE-Routine (19 @ 12:57) >  Impression:      No radiographic evidence of acute cardiopulmonary disease.    < end of copied text >  < from: US Kidney and Bladder (19 @ 10:02) >  Impression:    Limited evaluation the kidneys.    Normal urinary bladder.    < end of copied text >      PHYSICAL EXAM:    GENERAL: NAD, AAOx3  HEENT:  Atraumatic, Normocephalic. EOMI, PERRLA, conjunctiva and sclera clear, No JVD  PULMONARY: Clear to auscultation bilaterally; No wheeze  CARDIOVASCULAR: Regular rate and rhythm; systolic murmur present no rubs, or gallops  GASTROINTESTINAL: Soft, Nontender, Nondistended; Bowel sounds present  MUSCULOSKELETAL:  2+ Peripheral Pulses, No clubbing, cyanosis. Left leg is swollen with warmth and tenderness. Left A/C fossa with erythema and palpable cord like structure.  NEUROLOGY: non-focal  SKIN: No rashes or lesions

## 2019-05-15 NOTE — DIETITIAN INITIAL EVALUATION ADULT. - OTHER INFO
Pt presents after a mechanical fall with a left hip subtrochanteric comminuted fracture, she underwent surgery with left Femur IMN. Pending repeat blood cultures. Will check duplex LE. Acute on chronic anemia, likely acute blood loss anemia from surgery--received 3 units PRBCs during this admission. Pt will go to SNF once H/H stabilizes. Reason for Assessment: LOS

## 2019-05-15 NOTE — DIETITIAN INITIAL EVALUATION ADULT. - PHYSICAL APPEARANCE
well nourished/alert and oriented. BMI: 23.4, IBW: 100#, UBW: ~120#, denies any recent wt changes. no edema and surgical incision noted.

## 2019-05-15 NOTE — DIETITIAN INITIAL EVALUATION ADULT. - ORAL INTAKE PTA
Pt with great appetite/po intake, typically consumes 3 meals + snacks throughout the day. Denies use of oral nutrition supplements and has NKFA./good

## 2019-05-15 NOTE — DIETITIAN INITIAL EVALUATION ADULT. - FEEDING SKILL
Pt was finishing up breakfast at time of RD visit, consumed all of it w/o any issues. Pt states she has a great appetite and is consuming just about everything./independent

## 2019-05-15 NOTE — DIETITIAN INITIAL EVALUATION ADULT. - ENERGY NEEDS
Calories: 0384-3753 kcal/day (MSJ x 1.2-1.3 AF)  Protein: 54-65 gm/day (1-1.2 gm/kg CBW)  Fluid: 1 ml/kcal

## 2019-05-16 NOTE — PROGRESS NOTE ADULT - SUBJECTIVE AND OBJECTIVE BOX
ESSIE ARMENDARIZ 89y Female  MRN#: [id number]   CODE STATUS:     SUBJECTIVE  Patient is a 89y old Female who presents with a chief complaint of Hip fracture (16 May 2019 13:52)  Hospital course has been complicated by undergoing surgery for left femur fracture with IMN and requiring PRBC transfusions for drop in H/H.  Today is hospital day 8d, and this morning she is not having any new complaints. She denied hx of any discharge from the wound, fever, cough, SOB, nausea, vomiting, diarrhea or constipation.  She is still febrile with T max of 100.3.        OBJECTIVE  PAST MEDICAL & SURGICAL HISTORY  PVD (peripheral vascular disease)  Chronic kidney disease (CKD)  CAD (coronary artery disease)  S/P cholecystectomy    ALLERGIES:  No Known Allergies    MEDICATIONS:  STANDING MEDICATIONS  amLODIPine   Tablet 10 milliGRAM(s) Oral daily  aspirin  chewable 81 milliGRAM(s) Oral daily  atorvastatin 20 milliGRAM(s) Oral at bedtime  carvedilol 6.25 milliGRAM(s) Oral every 12 hours  chlorhexidine 4% Liquid 1 Application(s) Topical <User Schedule>  ferrous    sulfate 325 milliGRAM(s) Oral daily  pantoprazole    Tablet 40 milliGRAM(s) Oral before breakfast  potassium phosphate / sodium phosphate powder 1 Packet(s) Oral two times a day    PRN MEDICATIONS  acetaminophen   Tablet .. 650 milliGRAM(s) Oral every 6 hours PRN      VITAL SIGNS: Last 24 Hours  T(C): 37.3 (16 May 2019 08:12), Max: 37.9 (15 May 2019 23:00)  T(F): 99.2 (16 May 2019 08:12), Max: 100.3 (15 May 2019 23:00)  HR: 81 (16 May 2019 08:12) (76 - 86)  BP: 133/62 (16 May 2019 08:12) (133/62 - 147/64)  BP(mean): --  RR: 18 (16 May 2019 08:12) (18 - 18)  SpO2: --    LABS:                        8.3    10.07 )-----------( 169      ( 16 May 2019 07:10 )             24.2     05-16    142  |  111<H>  |  30<H>  ----------------------------<  98  3.9   |  22  |  1.3    Ca    7.4<L>      16 May 2019 07:10  Phos  2.6     05-16  Mg     2.1     05-16    TPro  4.5<L>  /  Alb  2.3<L>  /  TBili  1.8<H>  /  DBili  x   /  AST  22  /  ALT  10  /  AlkPhos  38  05-16              Culture - Blood (collected 14 May 2019 19:54)  Source: .Blood None  Preliminary Report (16 May 2019 03:01):    No growth to date.    Culture - Blood (collected 14 May 2019 19:54)  Source: .Blood None  Preliminary Report (16 May 2019 03:01):    No growth to date.          RADIOLOGY:  < from: Xray Chest 1 View- PORTABLE-Routine (05.14.19 @ 12:57) >  Impression:      No radiographic evidence of acute cardiopulmonary disease.    < end of copied text >  < from: US Kidney and Bladder (05.14.19 @ 10:02) >  Impression:    Limited evaluation the kidneys.    Normal urinary bladder.    < end of copied text >      PHYSICAL EXAM:    GENERAL: NAD, AAOx3  HEENT:  Atraumatic, Normocephalic. EOMI, PERRLA, conjunctiva and sclera clear, No JVD  PULMONARY: Clear to auscultation bilaterally; No wheeze  CARDIOVASCULAR: Regular rate and rhythm; systolic murmur present no rubs, or gallops  GASTROINTESTINAL: Soft, Nontender, Nondistended; Bowel sounds present  MUSCULOSKELETAL:  2+ Peripheral Pulses, No clubbing, cyanosis. Left leg is swollen with warmth and tenderness. Left A/C fossa with erythema and palpable cord like structure.  NEUROLOGY: non-focal  SKIN: No rashes or lesions

## 2019-05-16 NOTE — PROGRESS NOTE ADULT - ASSESSMENT
A/P: # Left femur fracture after mechanical fall in setting of diffuse osteopenia, s/p IMN 5/11.  - WBAT, out of bed to chair, PT  - incentive spirometry  - pain controlled with Tylenol 650 mg Q 8 hrs PRN and Morphine PRN  # Acute blood loss anemia postop and likely hematoma - thigh hematoma does not look worse, Hb stable after transfusion 5/14, no other signs of bleeding, continue to monitor Hb Q24hrs.  # Fever, might be due to hematoma itself, and LUE thrombophlebitis. UA negative, CXR negative, BCx pending, continue to  monitor.  # LUE cephalic vein thrombophlebitis provoked by IV line, pt is asymptomatic, no ABx, no AC  # CAD - cont home meds, can resume ASA  # CKD3 - stable creatinine  # PAD - resume cilostazol once Hb stable   # HTN - cont current management    DVT ppx was on hold due to low Hb, can resume tomorrow if Hb remains stable after restarting ASA, cont SCD  DC planning    #Progress Note Handoff  Pending rehab placement  Consults: none  Tests: none  Test Results: Hb in AM  Family Discussion: not needed  Future Disposition: SNF .

## 2019-05-16 NOTE — PROGRESS NOTE ADULT - SUBJECTIVE AND OBJECTIVE BOX
89yFemale POD #   5   S/P orif left subtroch femur fx     Patient seen and examined at bedside .. No complaints of chest pain, SOB, N/V.  NAD    Vital Signs Last 24 Hrs  T(C): 37.3 (16 May 2019 08:12), Max: 37.9 (15 May 2019 23:00)  T(F): 99.2 (16 May 2019 08:12), Max: 100.3 (15 May 2019 23:00)  HR: 81 (16 May 2019 08:12) (76 - 86)  BP: 133/62 (16 May 2019 08:12) (133/62 - 147/64)  BP(mean): --  RR: 18 (16 May 2019 08:12) (18 - 18)  SpO2: --           PE:   left hip  mild serosanguinous drainage proximally, dsd  changed         Compartments soft         NVID,  SILT                           8.3    10.07 )-----------( 169      ( 16 May 2019 07:10 )             24.2           A/P: 89yFemale POD # 5 S/P  orif left subtroch femur fx             OOB to Chair            Physical Therapy           Pain control            Incentive Spirometry            DVT Prophylaxis

## 2019-05-16 NOTE — PROGRESS NOTE ADULT - SUBJECTIVE AND OBJECTIVE BOX
ESSIE ARMENDARIZ    Patient is a 89y old  Female who presents with a chief complaint of Hip fracture (16 May 2019 12:04)    HPI:  89 yo F with PMH of CAD s/p multiple stents, PVD left, Stage 4 CKD comes to the ED after mechanical fall. She fell while she was walking with the shopping cart. She denies any LOC, dizziness or palpitation. She was unable to stand after the fall.  Pt was found to have  left proximal femoral shaft comminuted subtrochanteric fracture and underwent IMN on 5/11. Post op course complicated by acute anemia and fever.     INTERVAL HPI/OVERNIGHT EVENTS: no overnight events, pt states pain is better today, no other complaints.    CONSTITUTIONAL: No weakness, fevers or chills  EYES/ENT: No visual changes;  No vertigo or throat pain   NECK: No pain or stiffness  RESPIRATORY: No cough, wheezing, hemoptysis; No shortness of breath  CARDIOVASCULAR: No chest pain or palpitations  GASTROINTESTINAL: No abdominal or epigastric pain. No nausea, vomiting, or hematemesis; No diarrhea or constipation. No melena or hematochezia.  GENITOURINARY: No dysuria, frequency or hematuria  NEUROLOGICAL: No numbness or weakness  SKIN: No itching, rashes     PHYSICAL EXAM:  T(C): 37.3, Max: 37.9 (05-15-19 @ 23:00)  HR: 81 (76 - 86)  BP: 133/62 (133/62 - 147/64)  RR: 18 (18 - 18)  SpO2: --    GENERAL: NAD  PULMONARY/CHEST: No rales, rhonchi, wheezing  CARDIOVASC: Regular rate and rhythm  GI/ABDOMEN: Soft, Nontender, Nondistended; Bowel sounds present  EXTREMITIES:  Post op site with clean dressing, still swollen, but soft. No calf tenderness b/l. LUE AC vein with induration, no swelling/warmth or erythema   NERVOUS SYSTEM:  Alert & Oriented X3, no focal deficit     Consultant(s) Notes Reviewed by me.     LABS:                        8.3    10.07 )-----------( 169      ( 16 May 2019 07:10 )             24.2     Hemoglobin: 8.3 (05-16)  Hemoglobin: 8.4 (05-15)  Hemoglobin: 8.6 (05-15)    05-16    142  |  111<H>  |  30<H>  ----------------------------<  98  3.9   |  22  |  1.3    Ca    7.4<L>      16 May 2019 07:10  Phos  2.6     05-16  Mg     2.1     05-16    TPro  4.5<L>  /  Alb  2.3<L>  /  TBili  1.8<H>  /  DBili  x   /  AST  22  /  ALT  10  /  AlkPhos  38  05-16    Culture - Blood (collected 14 May 2019 19:54)  Source: .Blood None  Preliminary Report (16 May 2019 03:01):    No growth to date.    Culture - Blood (collected 14 May 2019 19:54)  Source: .Blood None  Preliminary Report (16 May 2019 03:01):    No growth to date.    Us LUE: Superficial thrombophlebitis of the left cephalic vein.    US duplex venous LE: No evidence of deep venous thrombosis or superficial thrombophlebitis in   bilateral lower extremities.    MEDICATIONS  (STANDING):  amLODIPine   Tablet 10 milliGRAM(s) Oral daily  aspirin  chewable 81 milliGRAM(s) Oral daily  atorvastatin 20 milliGRAM(s) Oral at bedtime  carvedilol 6.25 milliGRAM(s) Oral every 12 hours  chlorhexidine 4% Liquid 1 Application(s) Topical <User Schedule>  ferrous    sulfate 325 milliGRAM(s) Oral daily  pantoprazole    Tablet 40 milliGRAM(s) Oral before breakfast  potassium phosphate / sodium phosphate powder 1 Packet(s) Oral two times a day    MEDICATIONS  (PRN):  acetaminophen   Tablet .. 650 milliGRAM(s) Oral every 6 hours PRN Temp greater or equal to 38C (100.4F), Moderate Pain (4 - 6)

## 2019-05-16 NOTE — PROGRESS NOTE ADULT - ASSESSMENT
89 yo F with hx of CAD with multiple stents, peripheral vascular disease, and stage 3 CKD presents after a mechanical fall with a left hip subtrochanteric comminuted fracture, she underwent surgery with left Femur IMN.    # Fever most likely from thrombophlebitis left arm - improving off ABX  - no leucocytosis or left shift.   - pt only complaining of increased urine freq but UA negative. No hx of cough or diarrhea.  - left AC, firm cord like lesion with erythema, no tenderness, LUE duplex showed cephalic vein thrombophlebitis, continue to monitor without ABx, warm compresses and arm elevation.  - repeat CXR did not show any cardio-pulmonary pathology.  - repeat blood cultures NGTD. duplex LE -ve.      # Acute on chronic anemia, likely acute blood loss anemia post - op  - Hgb 8.6 today from 8.8 yesterday 5/14.   - received 5 units PRBCs during this admission.  - increased swelling of LLE.  - Iron studies suggest deficiency c/w supplemental iron.   - maintain active type and screen, monitor H/H q12.   - Transfuse to target Hb of 8 as pt has CAD.  - continue to hold ASA / PLAVIX / DVT ppx.  - no need for repeat imaging of left hip as per ortho.    # Left subtrochanteric comminuted fracture  - s/p Left Femur IMN, POD#4  - Pain control with morphine 2 mg IV TID PRN   - TTWB LLE, OOBTC.  - DVT ppx on hold in light of drop in Hb.     # CAD with multiple stents  - was on Plavix 75 mg daily at home, switched to ASA in hospital for surgery. Currently off ASA and Plavix from drop in H/H.   - 2D Echo: LVEF 65% / grade 1 diastolic dysfunction / mild aortic stenosis  - will resume ASA or Plavix once Hb stable.    # MARILU on CKD III- resolved  - creatinine 1.4 today from 2.2  - most likely pre-renal from drop in hemoglobin responded to fluids, c/w IVF as pt is not eating properly.  - US KUB neg for hydro.     # Hypertension  - c/w amlodipine 10mg q24 home dose.    # Peripheral vascular disease  - was on Cilostazol 100 BID at home, currently on hold will resume once H/H is stable.  - C/w Lipitor 20 mg daily    # GI ppx  - Diet: DASH diet post-op    # DVT ppx  - SCDs for now.    # Dispo  - pt will go to SNF once H/H stabilizes.

## 2019-05-17 NOTE — PROGRESS NOTE ADULT - ASSESSMENT
89 yo F with hx of CAD with multiple stents, peripheral vascular disease, and stage 3 CKD presents after a mechanical fall with a left hip subtrochanteric comminuted fracture, she underwent surgery with left Femur IMN.    # Fever most likely from thrombophlebitis left arm   - no leucocytosis or left shift.   - pt only complaining of increased urine freq, will repeat UA prior UA negative. No hx of cough or diarrhea.  - left AC, firm cord like lesion with erythema, no tenderness, LUE duplex showed cephalic vein thrombophlebitis, continue to monitor without ABx, warm compresses and arm elevation.  - repeat CXR did not show any cardio-pulmonary pathology.  - repeat blood cultures NGTD. duplex LE -ve.    # Acute on chronic anemia, likely acute blood loss anemia post - op (resolved)  - Hgb stable 8.3 today   - received 5 units PRBCs during this admission.  - Iron studies suggest deficiency c/w supplemental iron.   - maintain active type and screen, monitor H/H.  - Transfuse to target Hb of 8 as pt has CAD.  - ASA resumed, will resume DVT prophylaxis today, continue to hold cilastazol.    # Left subtrochanteric comminuted fracture  - s/p Left Femur IMN, POD#5  - Pain control with morphine 2 mg IV TID PRN   - TTWB LLE, OOBTC.    # CAD with multiple stents  - was on Plavix 75 mg daily at home, switched to ASA in hospital for surgery.    - 2D Echo: LVEF 65% / grade 1 diastolic dysfunction / mild aortic stenosis  - c/w ASA, switch to Plavix once Hb stable.    # MARILU on CKD III- resolved  - creatinine 1.3 today from 2.2  - most likely pre-renal from drop in hemoglobin responded to fluids, c/w IVF as pt is not eating properly.  - US KUB neg for hydro.     # Hypertension  - c/w amlodipine 10mg q24 home dose.    # Peripheral vascular disease  - was on Cilostazol 100 BID at home, currently on hold will resume once H/H is stable.  - C/w Lipitor 20 mg daily    # GI ppx  - Diet: DASH diet post-op    # DVT ppx  - heparin s/c 5000 BID.    # Dispo  - pt will go to SNF once afebrile and H/H stabilizes.

## 2019-05-17 NOTE — PROGRESS NOTE ADULT - PROVIDER SPECIALTY LIST ADULT
Hospitalist
Hospitalist
Internal Medicine
Orthopedics
Internal Medicine
Internal Medicine
Orthopedics

## 2019-05-17 NOTE — PROGRESS NOTE ADULT - ATTENDING COMMENTS
Agree with resident's note, HPI, PE, assessment and plan.  Pt was seen and examined independently.     pt does not have any complaints, states she feels better overall. Denies cough, pt c/o increased frequency of urination, no other complaints.    Vitals reviewed, low grade fever last night, normotensive.    Physical exam:  GENERAL: NAD  PULMONARY/CHEST: No rales, rhonchi, wheezing  CARDIOVASC: Regular rate and rhythm  GI/ABDOMEN: Soft, Nontender, Nondistended; Bowel sounds present  EXTREMITIES:  Post op site with some drainage, still swollen, but soft. No calf tenderness b/l. LUE AC vein with induration, no swelling/warmth or erythema   NERVOUS SYSTEM:  Alert & Oriented X3, no focal deficit    Labs: Hemoglobin: 8.3 (05-17)  Hemoglobin: 8.2 (05-16)  Hemoglobin: 8.3 (05-16)    A/P: # Left femur fracture after mechanical fall in setting of diffuse osteopenia, s/p IMN 5/11.  - WBAT, out of bed to chair, PT  - incentive spirometry  - pain controlled with Tylenol 650 mg Q 8 hrs PRN and Morphine PRN  # Acute blood loss anemia postop and likely hematoma - thigh hematoma does not look worse, Hb stable after transfusion 5/14, no other signs of bleeding.  # Fever, might be due to hematoma itself, and LUE thrombophlebitis and UTI. CXR, BCx negative  # Uncomplicated cystitis - Repeat UA with +Nitrite, trace Leuk and bacteria. will treat with ABx for 3 days.  # LUE cephalic vein thrombophlebitis provoked by IV line, pt is asymptomatic, no ABx, no AC  # CAD - cont home meds, can resume ASA  # CKD3 - stable creatinine  # PAD - resume cilostazol    # HTN - cont current management    Pt is clinically stable for discharge to SNF for STR, daughter in agreement.

## 2019-05-17 NOTE — DISCHARGE NOTE PROVIDER - HOSPITAL COURSE
89 yo F with PMH of CAD s/p multiple stents, PVD left, Stage 4 CKD presented to the ED after mechanical fall. She fell while she was walking with the shopping cart. She denied any LOC, dizziness or palpitation. She was unable to stand after the fall. Workup showed left femur subtrochanteric fracture she went for surgery IMN. She was seen by cardio and her plavix was switched to Aspirin for surgery. Post op course was complicated by acute blood loss anemia from trauma and surgery, she received 5 units PRBCs and supplemental oxygen. She was off her plavix, cilastazol and dvt ppx for bleeding which were resovled once H/H became stable. She also had fever from superficial phlebitis of left cephalic vein vs UTI. She will go home on PO cipro for 3 days. She is medically stable for discharge and will follow up with orthopedics outpatient.

## 2019-05-17 NOTE — PROGRESS NOTE ADULT - SUBJECTIVE AND OBJECTIVE BOX
ESSIE ARMENDARIZ 89y Female  MRN#: [id number]   CODE STATUS: FULL      SUBJECTIVE  Patient is a 89y old Female who presents with a chief complaint of Hip fracture (17 May 2019 08:34)  Hospital course has been complicated by undergoing surgery for left femur fracture with IMN and requiring PRBC transfusions for drop in H/H.  Today is hospital day 9d, and this morning she is not having any new complaints. She denied hx of any discharge from the wound, fever, cough, SOB, nausea, vomiting, diarrhea or constipation.  She is still febrile with T max of 100.5.      OBJECTIVE  PAST MEDICAL & SURGICAL HISTORY  PVD (peripheral vascular disease)  Chronic kidney disease (CKD)  CAD (coronary artery disease)  S/P cholecystectomy    ALLERGIES:  No Known Allergies    MEDICATIONS:  STANDING MEDICATIONS  amLODIPine   Tablet 10 milliGRAM(s) Oral daily  aspirin  chewable 81 milliGRAM(s) Oral daily  atorvastatin 20 milliGRAM(s) Oral at bedtime  carvedilol 6.25 milliGRAM(s) Oral every 12 hours  chlorhexidine 4% Liquid 1 Application(s) Topical <User Schedule>  ferrous    sulfate 325 milliGRAM(s) Oral daily  pantoprazole    Tablet 40 milliGRAM(s) Oral before breakfast  potassium phosphate / sodium phosphate powder 1 Packet(s) Oral two times a day    PRN MEDICATIONS  acetaminophen   Tablet .. 650 milliGRAM(s) Oral every 6 hours PRN      VITAL SIGNS: Last 24 Hours  T(C): 36.7 (17 May 2019 07:39), Max: 38.1 (16 May 2019 23:00)  T(F): 98 (17 May 2019 07:39), Max: 100.5 (16 May 2019 23:00)  HR: 74 (17 May 2019 07:39) (74 - 82)  BP: 139/63 (17 May 2019 07:39) (133/63 - 139/63)  BP(mean): --  RR: 18 (17 May 2019 07:39) (18 - 18)  SpO2: --    LABS:                        8.3    8.28  )-----------( 205      ( 17 May 2019 04:43 )             24.8     05-17    140  |  108  |  27<H>  ----------------------------<  97  3.9   |  23  |  1.3    Ca    7.5<L>      17 May 2019 04:43  Phos  2.7     05-17  Mg     1.9     05-17    TPro  4.5<L>  /  Alb  2.3<L>  /  TBili  1.5<H>  /  DBili  x   /  AST  22  /  ALT  13  /  AlkPhos  40  05-17              Culture - Blood (collected 15 May 2019 08:11)  Source: .Blood None  Preliminary Report (16 May 2019 18:01):    No growth to date.    Culture - Blood (collected 14 May 2019 19:54)  Source: .Blood None  Preliminary Report (16 May 2019 03:01):    No growth to date.    Culture - Blood (collected 14 May 2019 19:54)  Source: .Blood None  Preliminary Report (16 May 2019 03:01):    No growth to date.          RADIOLOGY:  < from: [facility] Duplex Upper Ext Vein Scan, Left (05.15.19 @ 10:27) >  Impression:    Superficial thrombophlebitis of the left cephalic vein.    < end of copied text >      PHYSICAL EXAM:    GENERAL: NAD, AAOx3  HEENT:  Atraumatic, Normocephalic. EOMI, PERRLA, conjunctiva and sclera clear, No JVD  PULMONARY: Clear to auscultation bilaterally; No wheeze  CARDIOVASCULAR: Regular rate and rhythm; systolic murmur present no rubs, or gallops  GASTROINTESTINAL: Soft, Nontender, Nondistended; Bowel sounds present  MUSCULOSKELETAL:  2+ Peripheral Pulses, No clubbing, cyanosis. Left leg is swollen with warmth and tenderness. Left A/C fossa with erythema and palpable cord like structure.  NEUROLOGY: non-focal  SKIN: No rashes or lesions

## 2019-05-17 NOTE — PROGRESS NOTE ADULT - NSHPATTENDINGPLANDISCUSS_GEN_ALL_CORE
residents, nursing, , patient
residents, nursing, , patient
patient, resident, RN, CM
residents, nursing, , patient

## 2019-05-17 NOTE — DISCHARGE NOTE NURSING/CASE MANAGEMENT/SOCIAL WORK - NSDCDPATPORTLINK_GEN_ALL_CORE
You can access the [facility] Portal, offered by [facility], by registering with the following website: [email or web address]

## 2019-05-17 NOTE — PROGRESS NOTE ADULT - SUBJECTIVE AND OBJECTIVE BOX
Patient seen and examined during AM rounds. Resting comfortably, denies fevers, chills, SOB.     PE:   Left hip proximal dressings mild staining, dressings removed, staple line c/d/i          Compartments soft         NVID,  SILT               A/P: 89F POD # 6 s/p left femur IMN:    - Pain control  - DVT PPX  - IS  - TTWB LLE  - PT OT OOBTC  - No need for dressings  - Medical management per primary team  - Follow up with Dr. Nichols in 1-2 weeks upon discharge, call to make appointment

## 2019-05-17 NOTE — PROGRESS NOTE ADULT - REASON FOR ADMISSION
Hip fracture

## 2019-05-17 NOTE — DISCHARGE NOTE PROVIDER - CARE PROVIDER_API CALL
Luis Eduardo Moore)  Internal Medicine  [address] [place] [address]  Phone: [phone]  Fax: [phone]  Follow Up Time:     Farooq Nichols)  Orthopaedic Surgery  [address] [place] [id number]  Phone: [phone]  Fax: [phone]  Follow Up Time:

## 2019-05-17 NOTE — DISCHARGE NOTE PROVIDER - CARE PROVIDERS DIRECT ADDRESSES
[email or web address]

## 2019-05-17 NOTE — DISCHARGE NOTE PROVIDER - NSDCCPCAREPLAN_GEN_ALL_CORE_FT
PRINCIPAL DISCHARGE DIAGNOSIS  Diagnosis: Subtrochanteric fracture of left femur  Assessment and Plan of Treatment: you were admitted for fracture of your left femur and you had surgery for that. Continue taking medications as advised and continue with physical therapy, follow up with your orthopedic surgeon in 2 weeks.      SECONDARY DISCHARGE DIAGNOSES  Diagnosis: UTI (urinary tract infection), bacterial  Assessment and Plan of Treatment: you have urinary tract infection, continue taking medications as advised and follow up with your primary medical doctor.    Diagnosis: Peripheral vascular disease  Assessment and Plan of Treatment: continue taking medications as advised and follow up with your vascular surgeon.    Diagnosis: Acute blood loss anemia  Assessment and Plan of Treatment: you had drop in your hemoglobin/ red blood cells after the surgery. Continue taking medications as advised and and check your CBC after 3 days and then after 7 days. Follow up with your PMD.

## 2019-05-21 PROBLEM — I25.10 ATHEROSCLEROTIC HEART DISEASE OF NATIVE CORONARY ARTERY WITHOUT ANGINA PECTORIS: Chronic | Status: ACTIVE | Noted: 2019-01-01

## 2019-05-21 PROBLEM — I73.9 PERIPHERAL VASCULAR DISEASE, UNSPECIFIED: Chronic | Status: ACTIVE | Noted: 2019-01-01

## 2019-05-21 PROBLEM — N18.9 CHRONIC KIDNEY DISEASE, UNSPECIFIED: Chronic | Status: ACTIVE | Noted: 2019-01-01

## 2020-01-01 ENCOUNTER — TRANSCRIPTION ENCOUNTER (OUTPATIENT)
Age: 85
End: 2020-01-01

## 2020-01-01 ENCOUNTER — INPATIENT (INPATIENT)
Facility: HOSPITAL | Age: 85
LOS: 3 days | End: 2020-04-08
Attending: INTERNAL MEDICINE | Admitting: INTERNAL MEDICINE
Payer: MEDICARE

## 2020-01-01 ENCOUNTER — INPATIENT (INPATIENT)
Facility: HOSPITAL | Age: 85
LOS: 3 days | Discharge: HOME | End: 2020-03-29
Attending: STUDENT IN AN ORGANIZED HEALTH CARE EDUCATION/TRAINING PROGRAM | Admitting: STUDENT IN AN ORGANIZED HEALTH CARE EDUCATION/TRAINING PROGRAM
Payer: MEDICARE

## 2020-01-01 VITALS
HEIGHT: 60 IN | RESPIRATION RATE: 38 BRPM | OXYGEN SATURATION: 98 % | HEART RATE: 117 BPM | DIASTOLIC BLOOD PRESSURE: 62 MMHG | SYSTOLIC BLOOD PRESSURE: 136 MMHG

## 2020-01-01 VITALS
OXYGEN SATURATION: 74 % | DIASTOLIC BLOOD PRESSURE: 67 MMHG | HEART RATE: 87 BPM | RESPIRATION RATE: 30 BRPM | TEMPERATURE: 96 F | SYSTOLIC BLOOD PRESSURE: 119 MMHG

## 2020-01-01 VITALS
DIASTOLIC BLOOD PRESSURE: 60 MMHG | TEMPERATURE: 100 F | HEART RATE: 82 BPM | RESPIRATION RATE: 20 BRPM | SYSTOLIC BLOOD PRESSURE: 130 MMHG

## 2020-01-01 VITALS
RESPIRATION RATE: 18 BRPM | SYSTOLIC BLOOD PRESSURE: 125 MMHG | TEMPERATURE: 99 F | DIASTOLIC BLOOD PRESSURE: 58 MMHG | HEART RATE: 79 BPM | OXYGEN SATURATION: 97 %

## 2020-01-01 DIAGNOSIS — U07.1 COVID-19: ICD-10-CM

## 2020-01-01 DIAGNOSIS — J12.89 OTHER VIRAL PNEUMONIA: ICD-10-CM

## 2020-01-01 DIAGNOSIS — I12.9 HYPERTENSIVE CHRONIC KIDNEY DISEASE WITH STAGE 1 THROUGH STAGE 4 CHRONIC KIDNEY DISEASE, OR UNSPECIFIED CHRONIC KIDNEY DISEASE: ICD-10-CM

## 2020-01-01 DIAGNOSIS — F03.90 UNSPECIFIED DEMENTIA WITHOUT BEHAVIORAL DISTURBANCE: ICD-10-CM

## 2020-01-01 DIAGNOSIS — I25.10 ATHEROSCLEROTIC HEART DISEASE OF NATIVE CORONARY ARTERY WITHOUT ANGINA PECTORIS: ICD-10-CM

## 2020-01-01 DIAGNOSIS — N18.4 CHRONIC KIDNEY DISEASE, STAGE 4 (SEVERE): ICD-10-CM

## 2020-01-01 DIAGNOSIS — A41.89 OTHER SPECIFIED SEPSIS: ICD-10-CM

## 2020-01-01 DIAGNOSIS — N17.9 ACUTE KIDNEY FAILURE, UNSPECIFIED: ICD-10-CM

## 2020-01-01 DIAGNOSIS — R65.20 SEVERE SEPSIS WITHOUT SEPTIC SHOCK: ICD-10-CM

## 2020-01-01 DIAGNOSIS — Z90.49 ACQUIRED ABSENCE OF OTHER SPECIFIED PARTS OF DIGESTIVE TRACT: Chronic | ICD-10-CM

## 2020-01-01 DIAGNOSIS — Z90.49 ACQUIRED ABSENCE OF OTHER SPECIFIED PARTS OF DIGESTIVE TRACT: ICD-10-CM

## 2020-01-01 DIAGNOSIS — I73.9 PERIPHERAL VASCULAR DISEASE, UNSPECIFIED: ICD-10-CM

## 2020-01-01 DIAGNOSIS — Z79.82 LONG TERM (CURRENT) USE OF ASPIRIN: ICD-10-CM

## 2020-01-01 DIAGNOSIS — R06.02 SHORTNESS OF BREATH: ICD-10-CM

## 2020-01-01 DIAGNOSIS — Z66 DO NOT RESUSCITATE: ICD-10-CM

## 2020-01-01 DIAGNOSIS — J96.01 ACUTE RESPIRATORY FAILURE WITH HYPOXIA: ICD-10-CM

## 2020-01-01 DIAGNOSIS — E86.0 DEHYDRATION: ICD-10-CM

## 2020-01-01 DIAGNOSIS — E87.5 HYPERKALEMIA: ICD-10-CM

## 2020-01-01 DIAGNOSIS — E87.0 HYPEROSMOLALITY AND HYPERNATREMIA: ICD-10-CM

## 2020-01-01 DIAGNOSIS — B97.29 OTHER CORONAVIRUS AS THE CAUSE OF DISEASES CLASSIFIED ELSEWHERE: ICD-10-CM

## 2020-01-01 DIAGNOSIS — Z95.5 PRESENCE OF CORONARY ANGIOPLASTY IMPLANT AND GRAFT: ICD-10-CM

## 2020-01-01 DIAGNOSIS — Z51.5 ENCOUNTER FOR PALLIATIVE CARE: ICD-10-CM

## 2020-01-01 LAB
ALBUMIN SERPL ELPH-MCNC: 2.5 G/DL — LOW (ref 3.5–5.2)
ALBUMIN SERPL ELPH-MCNC: 2.6 G/DL — LOW (ref 3.5–5.2)
ALBUMIN SERPL ELPH-MCNC: 2.7 G/DL — LOW (ref 3.5–5.2)
ALBUMIN SERPL ELPH-MCNC: 2.9 G/DL — LOW (ref 3.5–5.2)
ALBUMIN SERPL ELPH-MCNC: 3 G/DL — LOW (ref 3.5–5.2)
ALBUMIN SERPL ELPH-MCNC: 3.3 G/DL — LOW (ref 3.5–5.2)
ALBUMIN SERPL ELPH-MCNC: 3.4 G/DL — LOW (ref 3.5–5.2)
ALP SERPL-CCNC: 104 U/L — SIGNIFICANT CHANGE UP (ref 30–115)
ALP SERPL-CCNC: 126 U/L — HIGH (ref 30–115)
ALP SERPL-CCNC: 52 U/L — SIGNIFICANT CHANGE UP (ref 30–115)
ALP SERPL-CCNC: 55 U/L — SIGNIFICANT CHANGE UP (ref 30–115)
ALP SERPL-CCNC: 56 U/L — SIGNIFICANT CHANGE UP (ref 30–115)
ALP SERPL-CCNC: 58 U/L — SIGNIFICANT CHANGE UP (ref 30–115)
ALP SERPL-CCNC: 61 U/L — SIGNIFICANT CHANGE UP (ref 30–115)
ALP SERPL-CCNC: 63 U/L — SIGNIFICANT CHANGE UP (ref 30–115)
ALP SERPL-CCNC: 67 U/L — SIGNIFICANT CHANGE UP (ref 30–115)
ALT FLD-CCNC: 20 U/L — SIGNIFICANT CHANGE UP (ref 0–41)
ALT FLD-CCNC: 23 U/L — SIGNIFICANT CHANGE UP (ref 0–41)
ALT FLD-CCNC: 25 U/L — SIGNIFICANT CHANGE UP (ref 0–41)
ALT FLD-CCNC: 38 U/L — SIGNIFICANT CHANGE UP (ref 0–41)
ALT FLD-CCNC: 40 U/L — SIGNIFICANT CHANGE UP (ref 0–41)
ALT FLD-CCNC: 44 U/L — HIGH (ref 0–41)
ALT FLD-CCNC: 51 U/L — HIGH (ref 0–41)
ANION GAP SERPL CALC-SCNC: 10 MMOL/L — SIGNIFICANT CHANGE UP (ref 7–14)
ANION GAP SERPL CALC-SCNC: 11 MMOL/L — SIGNIFICANT CHANGE UP (ref 7–14)
ANION GAP SERPL CALC-SCNC: 12 MMOL/L — SIGNIFICANT CHANGE UP (ref 7–14)
ANION GAP SERPL CALC-SCNC: 13 MMOL/L — SIGNIFICANT CHANGE UP (ref 7–14)
ANION GAP SERPL CALC-SCNC: 15 MMOL/L — HIGH (ref 7–14)
ANION GAP SERPL CALC-SCNC: 16 MMOL/L — HIGH (ref 7–14)
ANION GAP SERPL CALC-SCNC: 19 MMOL/L — HIGH (ref 7–14)
ANION GAP SERPL CALC-SCNC: 22 MMOL/L — HIGH (ref 7–14)
ANISOCYTOSIS BLD QL: SLIGHT — SIGNIFICANT CHANGE UP
APTT BLD: 31.9 SEC — SIGNIFICANT CHANGE UP (ref 27–39.2)
AST SERPL-CCNC: 125 U/L — HIGH (ref 0–41)
AST SERPL-CCNC: 36 U/L — SIGNIFICANT CHANGE UP (ref 0–41)
AST SERPL-CCNC: 37 U/L — SIGNIFICANT CHANGE UP (ref 0–41)
AST SERPL-CCNC: 38 U/L — SIGNIFICANT CHANGE UP (ref 0–41)
AST SERPL-CCNC: 39 U/L — SIGNIFICANT CHANGE UP (ref 0–41)
AST SERPL-CCNC: 39 U/L — SIGNIFICANT CHANGE UP (ref 0–41)
AST SERPL-CCNC: 61 U/L — HIGH (ref 0–41)
AST SERPL-CCNC: 64 U/L — HIGH (ref 0–41)
AST SERPL-CCNC: 89 U/L — HIGH (ref 0–41)
BASE EXCESS BLDV CALC-SCNC: -3.1 MMOL/L — LOW (ref -2–2)
BASOPHILS # BLD AUTO: 0.01 K/UL — SIGNIFICANT CHANGE UP (ref 0–0.2)
BASOPHILS # BLD AUTO: 0.02 K/UL — SIGNIFICANT CHANGE UP (ref 0–0.2)
BASOPHILS # BLD AUTO: 0.03 K/UL — SIGNIFICANT CHANGE UP (ref 0–0.2)
BASOPHILS # BLD AUTO: 0.05 K/UL — SIGNIFICANT CHANGE UP (ref 0–0.2)
BASOPHILS # BLD AUTO: 0.07 K/UL — SIGNIFICANT CHANGE UP (ref 0–0.2)
BASOPHILS NFR BLD AUTO: 0.1 % — SIGNIFICANT CHANGE UP (ref 0–1)
BASOPHILS NFR BLD AUTO: 0.2 % — SIGNIFICANT CHANGE UP (ref 0–1)
BASOPHILS NFR BLD AUTO: 0.3 % — SIGNIFICANT CHANGE UP (ref 0–1)
BASOPHILS NFR BLD AUTO: 0.4 % — SIGNIFICANT CHANGE UP (ref 0–1)
BASOPHILS NFR BLD AUTO: 0.5 % — SIGNIFICANT CHANGE UP (ref 0–1)
BASOPHILS NFR BLD AUTO: 0.7 % — SIGNIFICANT CHANGE UP (ref 0–1)
BASOPHILS NFR BLD AUTO: 0.7 % — SIGNIFICANT CHANGE UP (ref 0–1)
BASOPHILS NFR BLD AUTO: 1.8 % — HIGH (ref 0–1)
BILIRUB SERPL-MCNC: 0.3 MG/DL — SIGNIFICANT CHANGE UP (ref 0.2–1.2)
BILIRUB SERPL-MCNC: 0.3 MG/DL — SIGNIFICANT CHANGE UP (ref 0.2–1.2)
BILIRUB SERPL-MCNC: 0.4 MG/DL — SIGNIFICANT CHANGE UP (ref 0.2–1.2)
BILIRUB SERPL-MCNC: 0.5 MG/DL — SIGNIFICANT CHANGE UP (ref 0.2–1.2)
BILIRUB SERPL-MCNC: 0.7 MG/DL — SIGNIFICANT CHANGE UP (ref 0.2–1.2)
BILIRUB SERPL-MCNC: 0.7 MG/DL — SIGNIFICANT CHANGE UP (ref 0.2–1.2)
BILIRUB SERPL-MCNC: 0.9 MG/DL — SIGNIFICANT CHANGE UP (ref 0.2–1.2)
BUN SERPL-MCNC: 34 MG/DL — HIGH (ref 10–20)
BUN SERPL-MCNC: 44 MG/DL — HIGH (ref 10–20)
BUN SERPL-MCNC: 54 MG/DL — HIGH (ref 10–20)
BUN SERPL-MCNC: 54 MG/DL — HIGH (ref 10–20)
BUN SERPL-MCNC: 55 MG/DL — HIGH (ref 10–20)
BUN SERPL-MCNC: 56 MG/DL — HIGH (ref 10–20)
BUN SERPL-MCNC: 65 MG/DL — CRITICAL HIGH (ref 10–20)
BUN SERPL-MCNC: 69 MG/DL — CRITICAL HIGH (ref 10–20)
BUN SERPL-MCNC: 70 MG/DL — CRITICAL HIGH (ref 10–20)
BUN SERPL-MCNC: 84 MG/DL — CRITICAL HIGH (ref 10–20)
CA-I SERPL-SCNC: 1.13 MMOL/L — SIGNIFICANT CHANGE UP (ref 1.12–1.3)
CALCIUM SERPL-MCNC: 8 MG/DL — LOW (ref 8.5–10.1)
CALCIUM SERPL-MCNC: 8.1 MG/DL — LOW (ref 8.5–10.1)
CALCIUM SERPL-MCNC: 8.1 MG/DL — LOW (ref 8.5–10.1)
CALCIUM SERPL-MCNC: 8.2 MG/DL — LOW (ref 8.5–10.1)
CALCIUM SERPL-MCNC: 8.3 MG/DL — LOW (ref 8.5–10.1)
CALCIUM SERPL-MCNC: 8.3 MG/DL — LOW (ref 8.5–10.1)
CALCIUM SERPL-MCNC: 8.4 MG/DL — LOW (ref 8.5–10.1)
CALCIUM SERPL-MCNC: 8.5 MG/DL — SIGNIFICANT CHANGE UP (ref 8.5–10.1)
CALCIUM SERPL-MCNC: 8.6 MG/DL — SIGNIFICANT CHANGE UP (ref 8.5–10.1)
CALCIUM SERPL-MCNC: 8.7 MG/DL — SIGNIFICANT CHANGE UP (ref 8.5–10.1)
CHLORIDE SERPL-SCNC: 105 MMOL/L — SIGNIFICANT CHANGE UP (ref 98–110)
CHLORIDE SERPL-SCNC: 110 MMOL/L — SIGNIFICANT CHANGE UP (ref 98–110)
CHLORIDE SERPL-SCNC: 112 MMOL/L — HIGH (ref 98–110)
CHLORIDE SERPL-SCNC: 113 MMOL/L — HIGH (ref 98–110)
CHLORIDE SERPL-SCNC: 115 MMOL/L — HIGH (ref 98–110)
CHLORIDE SERPL-SCNC: 115 MMOL/L — HIGH (ref 98–110)
CHLORIDE SERPL-SCNC: 120 MMOL/L — HIGH (ref 98–110)
CHLORIDE SERPL-SCNC: 121 MMOL/L — HIGH (ref 98–110)
CHLORIDE SERPL-SCNC: 121 MMOL/L — HIGH (ref 98–110)
CHLORIDE SERPL-SCNC: 122 MMOL/L — HIGH (ref 98–110)
CO2 SERPL-SCNC: 15 MMOL/L — LOW (ref 17–32)
CO2 SERPL-SCNC: 17 MMOL/L — SIGNIFICANT CHANGE UP (ref 17–32)
CO2 SERPL-SCNC: 19 MMOL/L — SIGNIFICANT CHANGE UP (ref 17–32)
CO2 SERPL-SCNC: 20 MMOL/L — SIGNIFICANT CHANGE UP (ref 17–32)
CO2 SERPL-SCNC: 21 MMOL/L — SIGNIFICANT CHANGE UP (ref 17–32)
CO2 SERPL-SCNC: 21 MMOL/L — SIGNIFICANT CHANGE UP (ref 17–32)
CO2 SERPL-SCNC: 22 MMOL/L — SIGNIFICANT CHANGE UP (ref 17–32)
CREAT SERPL-MCNC: 1.7 MG/DL — HIGH (ref 0.7–1.5)
CREAT SERPL-MCNC: 1.8 MG/DL — HIGH (ref 0.7–1.5)
CREAT SERPL-MCNC: 1.9 MG/DL — HIGH (ref 0.7–1.5)
CREAT SERPL-MCNC: 1.9 MG/DL — HIGH (ref 0.7–1.5)
CREAT SERPL-MCNC: 2.2 MG/DL — HIGH (ref 0.7–1.5)
CREAT SERPL-MCNC: 2.3 MG/DL — HIGH (ref 0.7–1.5)
CREAT SERPL-MCNC: 2.6 MG/DL — HIGH (ref 0.7–1.5)
CREAT SERPL-MCNC: 2.6 MG/DL — HIGH (ref 0.7–1.5)
CREAT SERPL-MCNC: 2.9 MG/DL — HIGH (ref 0.7–1.5)
CREAT SERPL-MCNC: 4.2 MG/DL — CRITICAL HIGH (ref 0.7–1.5)
CRP SERPL-MCNC: 1.81 MG/DL — HIGH (ref 0–0.4)
CRP SERPL-MCNC: 19.32 MG/DL — HIGH (ref 0–0.4)
CRP SERPL-MCNC: 2.09 MG/DL — HIGH (ref 0–0.4)
CRP SERPL-MCNC: 22.05 MG/DL — HIGH (ref 0–0.4)
CULTURE RESULTS: SIGNIFICANT CHANGE UP
EOSINOPHIL # BLD AUTO: 0 K/UL — SIGNIFICANT CHANGE UP (ref 0–0.7)
EOSINOPHIL # BLD AUTO: 0.01 K/UL — SIGNIFICANT CHANGE UP (ref 0–0.7)
EOSINOPHIL # BLD AUTO: 0.02 K/UL — SIGNIFICANT CHANGE UP (ref 0–0.7)
EOSINOPHIL # BLD AUTO: 0.03 K/UL — SIGNIFICANT CHANGE UP (ref 0–0.7)
EOSINOPHIL NFR BLD AUTO: 0 % — SIGNIFICANT CHANGE UP (ref 0–8)
EOSINOPHIL NFR BLD AUTO: 0.1 % — SIGNIFICANT CHANGE UP (ref 0–8)
EOSINOPHIL NFR BLD AUTO: 0.2 % — SIGNIFICANT CHANGE UP (ref 0–8)
EOSINOPHIL NFR BLD AUTO: 0.2 % — SIGNIFICANT CHANGE UP (ref 0–8)
EOSINOPHIL NFR BLD AUTO: 0.3 % — SIGNIFICANT CHANGE UP (ref 0–8)
EOSINOPHIL NFR BLD AUTO: 0.7 % — SIGNIFICANT CHANGE UP (ref 0–8)
FLU A RESULT: NEGATIVE — SIGNIFICANT CHANGE UP
FLU A RESULT: NEGATIVE — SIGNIFICANT CHANGE UP
FLUAV AG NPH QL: NEGATIVE — SIGNIFICANT CHANGE UP
FLUBV AG NPH QL: NEGATIVE — SIGNIFICANT CHANGE UP
GAS PNL BLDV: 139 MMOL/L — SIGNIFICANT CHANGE UP (ref 136–145)
GAS PNL BLDV: SIGNIFICANT CHANGE UP
GIANT PLATELETS BLD QL SMEAR: PRESENT — SIGNIFICANT CHANGE UP
GLUCOSE BLDC GLUCOMTR-MCNC: 122 MG/DL — HIGH (ref 70–99)
GLUCOSE SERPL-MCNC: 104 MG/DL — HIGH (ref 70–99)
GLUCOSE SERPL-MCNC: 107 MG/DL — HIGH (ref 70–99)
GLUCOSE SERPL-MCNC: 109 MG/DL — HIGH (ref 70–99)
GLUCOSE SERPL-MCNC: 119 MG/DL — HIGH (ref 70–99)
GLUCOSE SERPL-MCNC: 124 MG/DL — HIGH (ref 70–99)
GLUCOSE SERPL-MCNC: 129 MG/DL — HIGH (ref 70–99)
GLUCOSE SERPL-MCNC: 131 MG/DL — HIGH (ref 70–99)
GLUCOSE SERPL-MCNC: 142 MG/DL — HIGH (ref 70–99)
GLUCOSE SERPL-MCNC: 91 MG/DL — SIGNIFICANT CHANGE UP (ref 70–99)
GLUCOSE SERPL-MCNC: 99 MG/DL — SIGNIFICANT CHANGE UP (ref 70–99)
GRAM STN FLD: SIGNIFICANT CHANGE UP
HCO3 BLDV-SCNC: 24 MMOL/L — SIGNIFICANT CHANGE UP (ref 22–29)
HCT VFR BLD CALC: 26.3 % — LOW (ref 37–47)
HCT VFR BLD CALC: 27 % — LOW (ref 37–47)
HCT VFR BLD CALC: 27.3 % — LOW (ref 37–47)
HCT VFR BLD CALC: 27.9 % — LOW (ref 37–47)
HCT VFR BLD CALC: 27.9 % — LOW (ref 37–47)
HCT VFR BLD CALC: 29.4 % — LOW (ref 37–47)
HCT VFR BLD CALC: 32.6 % — LOW (ref 37–47)
HCT VFR BLD CALC: 32.7 % — LOW (ref 37–47)
HCT VFR BLDA CALC: 51.7 % — HIGH (ref 34–44)
HGB BLD CALC-MCNC: 16.9 G/DL — SIGNIFICANT CHANGE UP (ref 14–18)
HGB BLD-MCNC: 10.3 G/DL — LOW (ref 12–16)
HGB BLD-MCNC: 10.5 G/DL — LOW (ref 12–16)
HGB BLD-MCNC: 11.4 G/DL — LOW (ref 12–16)
HGB BLD-MCNC: 12.1 G/DL — SIGNIFICANT CHANGE UP (ref 12–16)
HGB BLD-MCNC: 9.5 G/DL — LOW (ref 12–16)
HGB BLD-MCNC: 9.5 G/DL — LOW (ref 12–16)
HGB BLD-MCNC: 9.6 G/DL — LOW (ref 12–16)
HGB BLD-MCNC: 9.9 G/DL — LOW (ref 12–16)
IMM GRANULOCYTES NFR BLD AUTO: 0.2 % — SIGNIFICANT CHANGE UP (ref 0.1–0.3)
IMM GRANULOCYTES NFR BLD AUTO: 0.3 % — SIGNIFICANT CHANGE UP (ref 0.1–0.3)
IMM GRANULOCYTES NFR BLD AUTO: 0.7 % — HIGH (ref 0.1–0.3)
IMM GRANULOCYTES NFR BLD AUTO: 1 % — HIGH (ref 0.1–0.3)
IMM GRANULOCYTES NFR BLD AUTO: 1.7 % — HIGH (ref 0.1–0.3)
IMM GRANULOCYTES NFR BLD AUTO: 1.9 % — HIGH (ref 0.1–0.3)
IMM GRANULOCYTES NFR BLD AUTO: 2.3 % — HIGH (ref 0.1–0.3)
INR BLD: 1.03 RATIO — SIGNIFICANT CHANGE UP (ref 0.65–1.3)
LACTATE BLDV-MCNC: 0.9 MMOL/L — SIGNIFICANT CHANGE UP (ref 0.5–1.6)
LACTATE SERPL-SCNC: 1.7 MMOL/L — SIGNIFICANT CHANGE UP (ref 0.7–2)
LDH SERPL L TO P-CCNC: 1156 — HIGH (ref 50–242)
LYMPHOCYTES # BLD AUTO: 0.23 K/UL — LOW (ref 1.2–3.4)
LYMPHOCYTES # BLD AUTO: 0.32 K/UL — LOW (ref 1.2–3.4)
LYMPHOCYTES # BLD AUTO: 0.51 K/UL — LOW (ref 1.2–3.4)
LYMPHOCYTES # BLD AUTO: 0.56 K/UL — LOW (ref 1.2–3.4)
LYMPHOCYTES # BLD AUTO: 0.59 K/UL — LOW (ref 1.2–3.4)
LYMPHOCYTES # BLD AUTO: 0.6 K/UL — LOW (ref 1.2–3.4)
LYMPHOCYTES # BLD AUTO: 0.7 K/UL — LOW (ref 1.2–3.4)
LYMPHOCYTES # BLD AUTO: 0.7 K/UL — LOW (ref 1.2–3.4)
LYMPHOCYTES # BLD AUTO: 12.2 % — LOW (ref 20.5–51.1)
LYMPHOCYTES # BLD AUTO: 15.4 % — LOW (ref 20.5–51.1)
LYMPHOCYTES # BLD AUTO: 23.6 % — SIGNIFICANT CHANGE UP (ref 20.5–51.1)
LYMPHOCYTES # BLD AUTO: 3.8 % — LOW (ref 20.5–51.1)
LYMPHOCYTES # BLD AUTO: 5 % — LOW (ref 20.5–51.1)
LYMPHOCYTES # BLD AUTO: 5.3 % — LOW (ref 20.5–51.1)
LYMPHOCYTES # BLD AUTO: 6.2 % — LOW (ref 20.5–51.1)
LYMPHOCYTES # BLD AUTO: 9.7 % — LOW (ref 20.5–51.1)
MACROCYTES BLD QL: SLIGHT — SIGNIFICANT CHANGE UP
MAGNESIUM SERPL-MCNC: 1.7 MG/DL — LOW (ref 1.8–2.4)
MAGNESIUM SERPL-MCNC: 1.9 MG/DL — SIGNIFICANT CHANGE UP (ref 1.8–2.4)
MAGNESIUM SERPL-MCNC: 2 MG/DL — SIGNIFICANT CHANGE UP (ref 1.8–2.4)
MAGNESIUM SERPL-MCNC: 2 MG/DL — SIGNIFICANT CHANGE UP (ref 1.8–2.4)
MANUAL SMEAR VERIFICATION: SIGNIFICANT CHANGE UP
MCHC RBC-ENTMCNC: 28.8 PG — SIGNIFICANT CHANGE UP (ref 27–31)
MCHC RBC-ENTMCNC: 28.9 PG — SIGNIFICANT CHANGE UP (ref 27–31)
MCHC RBC-ENTMCNC: 29 PG — SIGNIFICANT CHANGE UP (ref 27–31)
MCHC RBC-ENTMCNC: 29.3 PG — SIGNIFICANT CHANGE UP (ref 27–31)
MCHC RBC-ENTMCNC: 29.9 PG — SIGNIFICANT CHANGE UP (ref 27–31)
MCHC RBC-ENTMCNC: 30.3 PG — SIGNIFICANT CHANGE UP (ref 27–31)
MCHC RBC-ENTMCNC: 30.5 PG — SIGNIFICANT CHANGE UP (ref 27–31)
MCHC RBC-ENTMCNC: 31.1 PG — HIGH (ref 27–31)
MCHC RBC-ENTMCNC: 35 G/DL — SIGNIFICANT CHANGE UP (ref 32–37)
MCHC RBC-ENTMCNC: 35.2 G/DL — SIGNIFICANT CHANGE UP (ref 32–37)
MCHC RBC-ENTMCNC: 35.2 G/DL — SIGNIFICANT CHANGE UP (ref 32–37)
MCHC RBC-ENTMCNC: 35.5 G/DL — SIGNIFICANT CHANGE UP (ref 32–37)
MCHC RBC-ENTMCNC: 35.7 G/DL — SIGNIFICANT CHANGE UP (ref 32–37)
MCHC RBC-ENTMCNC: 36.1 G/DL — SIGNIFICANT CHANGE UP (ref 32–37)
MCHC RBC-ENTMCNC: 36.9 G/DL — SIGNIFICANT CHANGE UP (ref 32–37)
MCHC RBC-ENTMCNC: 37 G/DL — SIGNIFICANT CHANGE UP (ref 32–37)
MCV RBC AUTO: 82.1 FL — SIGNIFICANT CHANGE UP (ref 81–99)
MCV RBC AUTO: 82.1 FL — SIGNIFICANT CHANGE UP (ref 81–99)
MCV RBC AUTO: 82.3 FL — SIGNIFICANT CHANGE UP (ref 81–99)
MCV RBC AUTO: 82.4 FL — SIGNIFICANT CHANGE UP (ref 81–99)
MCV RBC AUTO: 82.5 FL — SIGNIFICANT CHANGE UP (ref 81–99)
MCV RBC AUTO: 83.8 FL — SIGNIFICANT CHANGE UP (ref 81–99)
MCV RBC AUTO: 84.3 FL — SIGNIFICANT CHANGE UP (ref 81–99)
MCV RBC AUTO: 84.3 FL — SIGNIFICANT CHANGE UP (ref 81–99)
MONOCYTES # BLD AUTO: 0.29 K/UL — SIGNIFICANT CHANGE UP (ref 0.1–0.6)
MONOCYTES # BLD AUTO: 0.37 K/UL — SIGNIFICANT CHANGE UP (ref 0.1–0.6)
MONOCYTES # BLD AUTO: 0.4 K/UL — SIGNIFICANT CHANGE UP (ref 0.1–0.6)
MONOCYTES # BLD AUTO: 0.65 K/UL — HIGH (ref 0.1–0.6)
MONOCYTES # BLD AUTO: 0.67 K/UL — HIGH (ref 0.1–0.6)
MONOCYTES # BLD AUTO: 0.8 K/UL — HIGH (ref 0.1–0.6)
MONOCYTES # BLD AUTO: 0.84 K/UL — HIGH (ref 0.1–0.6)
MONOCYTES # BLD AUTO: 0.86 K/UL — HIGH (ref 0.1–0.6)
MONOCYTES NFR BLD AUTO: 13.2 % — HIGH (ref 1.7–9.3)
MONOCYTES NFR BLD AUTO: 14.3 % — HIGH (ref 1.7–9.3)
MONOCYTES NFR BLD AUTO: 14.6 % — HIGH (ref 1.7–9.3)
MONOCYTES NFR BLD AUTO: 22.1 % — HIGH (ref 1.7–9.3)
MONOCYTES NFR BLD AUTO: 29.1 % — HIGH (ref 1.7–9.3)
MONOCYTES NFR BLD AUTO: 3.4 % — SIGNIFICANT CHANGE UP (ref 1.7–9.3)
MONOCYTES NFR BLD AUTO: 3.5 % — SIGNIFICANT CHANGE UP (ref 1.7–9.3)
MONOCYTES NFR BLD AUTO: 3.8 % — SIGNIFICANT CHANGE UP (ref 1.7–9.3)
NEUTROPHILS # BLD AUTO: 1.35 K/UL — LOW (ref 1.4–6.5)
NEUTROPHILS # BLD AUTO: 10.64 K/UL — HIGH (ref 1.4–6.5)
NEUTROPHILS # BLD AUTO: 2.41 K/UL — SIGNIFICANT CHANGE UP (ref 1.4–6.5)
NEUTROPHILS # BLD AUTO: 3.14 K/UL — SIGNIFICANT CHANGE UP (ref 1.4–6.5)
NEUTROPHILS # BLD AUTO: 3.3 K/UL — SIGNIFICANT CHANGE UP (ref 1.4–6.5)
NEUTROPHILS # BLD AUTO: 4.59 K/UL — SIGNIFICANT CHANGE UP (ref 1.4–6.5)
NEUTROPHILS # BLD AUTO: 7.62 K/UL — HIGH (ref 1.4–6.5)
NEUTROPHILS # BLD AUTO: 8.52 K/UL — HIGH (ref 1.4–6.5)
NEUTROPHILS NFR BLD AUTO: 45.6 % — SIGNIFICANT CHANGE UP (ref 42.2–75.2)
NEUTROPHILS NFR BLD AUTO: 63.7 % — SIGNIFICANT CHANGE UP (ref 42.2–75.2)
NEUTROPHILS NFR BLD AUTO: 69.2 % — SIGNIFICANT CHANGE UP (ref 42.2–75.2)
NEUTROPHILS NFR BLD AUTO: 72.1 % — SIGNIFICANT CHANGE UP (ref 42.2–75.2)
NEUTROPHILS NFR BLD AUTO: 75.6 % — HIGH (ref 42.2–75.2)
NEUTROPHILS NFR BLD AUTO: 88.2 % — HIGH (ref 42.2–75.2)
NEUTROPHILS NFR BLD AUTO: 89.2 % — HIGH (ref 42.2–75.2)
NEUTROPHILS NFR BLD AUTO: 90.7 % — HIGH (ref 42.2–75.2)
NRBC # BLD: 0 /100 WBCS — SIGNIFICANT CHANGE UP (ref 0–0)
NRBC # BLD: 2 /100 WBCS — HIGH (ref 0–0)
NRBC # BLD: 3 /100 WBCS — HIGH (ref 0–0)
NRBC # BLD: 4 /100 WBCS — HIGH (ref 0–0)
NT-PROBNP SERPL-SCNC: 1004 PG/ML — HIGH (ref 0–300)
NT-PROBNP SERPL-SCNC: 1557 PG/ML — HIGH (ref 0–300)
NT-PROBNP SERPL-SCNC: 6 PG/ML — SIGNIFICANT CHANGE UP (ref 0–300)
PCO2 BLDV: 47 MMHG — SIGNIFICANT CHANGE UP (ref 41–51)
PH BLDV: 7.31 — SIGNIFICANT CHANGE UP (ref 7.26–7.43)
PHOSPHATE SERPL-MCNC: 3.8 MG/DL — SIGNIFICANT CHANGE UP (ref 2.1–4.9)
PLAT MORPH BLD: NORMAL — SIGNIFICANT CHANGE UP
PLATELET # BLD AUTO: 173 K/UL — SIGNIFICANT CHANGE UP (ref 130–400)
PLATELET # BLD AUTO: 197 K/UL — SIGNIFICANT CHANGE UP (ref 130–400)
PLATELET # BLD AUTO: 213 K/UL — SIGNIFICANT CHANGE UP (ref 130–400)
PLATELET # BLD AUTO: 221 K/UL — SIGNIFICANT CHANGE UP (ref 130–400)
PLATELET # BLD AUTO: 234 K/UL — SIGNIFICANT CHANGE UP (ref 130–400)
PLATELET # BLD AUTO: 276 K/UL — SIGNIFICANT CHANGE UP (ref 130–400)
PLATELET # BLD AUTO: 318 K/UL — SIGNIFICANT CHANGE UP (ref 130–400)
PLATELET # BLD AUTO: 347 K/UL — SIGNIFICANT CHANGE UP (ref 130–400)
PO2 BLDV: 28 MMHG — SIGNIFICANT CHANGE UP (ref 20–40)
POTASSIUM BLDV-SCNC: 3.6 MMOL/L — SIGNIFICANT CHANGE UP (ref 3.3–5.6)
POTASSIUM SERPL-MCNC: 3.8 MMOL/L — SIGNIFICANT CHANGE UP (ref 3.5–5)
POTASSIUM SERPL-MCNC: 3.8 MMOL/L — SIGNIFICANT CHANGE UP (ref 3.5–5)
POTASSIUM SERPL-MCNC: 3.9 MMOL/L — SIGNIFICANT CHANGE UP (ref 3.5–5)
POTASSIUM SERPL-MCNC: 4 MMOL/L — SIGNIFICANT CHANGE UP (ref 3.5–5)
POTASSIUM SERPL-MCNC: 4 MMOL/L — SIGNIFICANT CHANGE UP (ref 3.5–5)
POTASSIUM SERPL-MCNC: 4.3 MMOL/L — SIGNIFICANT CHANGE UP (ref 3.5–5)
POTASSIUM SERPL-MCNC: 4.4 MMOL/L — SIGNIFICANT CHANGE UP (ref 3.5–5)
POTASSIUM SERPL-MCNC: 4.4 MMOL/L — SIGNIFICANT CHANGE UP (ref 3.5–5)
POTASSIUM SERPL-MCNC: 4.5 MMOL/L — SIGNIFICANT CHANGE UP (ref 3.5–5)
POTASSIUM SERPL-MCNC: 5.5 MMOL/L — HIGH (ref 3.5–5)
POTASSIUM SERPL-SCNC: 3.8 MMOL/L — SIGNIFICANT CHANGE UP (ref 3.5–5)
POTASSIUM SERPL-SCNC: 3.8 MMOL/L — SIGNIFICANT CHANGE UP (ref 3.5–5)
POTASSIUM SERPL-SCNC: 3.9 MMOL/L — SIGNIFICANT CHANGE UP (ref 3.5–5)
POTASSIUM SERPL-SCNC: 4 MMOL/L — SIGNIFICANT CHANGE UP (ref 3.5–5)
POTASSIUM SERPL-SCNC: 4 MMOL/L — SIGNIFICANT CHANGE UP (ref 3.5–5)
POTASSIUM SERPL-SCNC: 4.3 MMOL/L — SIGNIFICANT CHANGE UP (ref 3.5–5)
POTASSIUM SERPL-SCNC: 4.4 MMOL/L — SIGNIFICANT CHANGE UP (ref 3.5–5)
POTASSIUM SERPL-SCNC: 4.4 MMOL/L — SIGNIFICANT CHANGE UP (ref 3.5–5)
POTASSIUM SERPL-SCNC: 4.5 MMOL/L — SIGNIFICANT CHANGE UP (ref 3.5–5)
POTASSIUM SERPL-SCNC: 5.5 MMOL/L — HIGH (ref 3.5–5)
PROCALCITONIN SERPL-MCNC: 0.08 NG/ML — SIGNIFICANT CHANGE UP (ref 0.02–0.1)
PROCALCITONIN SERPL-MCNC: 0.11 NG/ML — HIGH (ref 0.02–0.1)
PROCALCITONIN SERPL-MCNC: 1.05 NG/ML — HIGH (ref 0.02–0.1)
PROCALCITONIN SERPL-MCNC: 1.06 NG/ML — HIGH (ref 0.02–0.1)
PROT SERPL-MCNC: 5.7 G/DL — LOW (ref 6–8)
PROT SERPL-MCNC: 5.8 G/DL — LOW (ref 6–8)
PROT SERPL-MCNC: 5.9 G/DL — LOW (ref 6–8)
PROT SERPL-MCNC: 6.2 G/DL — SIGNIFICANT CHANGE UP (ref 6–8)
PROT SERPL-MCNC: 6.3 G/DL — SIGNIFICANT CHANGE UP (ref 6–8)
PROT SERPL-MCNC: 6.4 G/DL — SIGNIFICANT CHANGE UP (ref 6–8)
PROT SERPL-MCNC: 6.5 G/DL — SIGNIFICANT CHANGE UP (ref 6–8)
PROT SERPL-MCNC: 6.6 G/DL — SIGNIFICANT CHANGE UP (ref 6–8)
PROT SERPL-MCNC: 6.8 G/DL — SIGNIFICANT CHANGE UP (ref 6–8)
PROTHROM AB SERPL-ACNC: 11.8 SEC — SIGNIFICANT CHANGE UP (ref 9.95–12.87)
RBC # BLD: 3.14 M/UL — LOW (ref 4.2–5.4)
RBC # BLD: 3.29 M/UL — LOW (ref 4.2–5.4)
RBC # BLD: 3.31 M/UL — LOW (ref 4.2–5.4)
RBC # BLD: 3.58 M/UL — LOW (ref 4.2–5.4)
RBC # BLD: 3.96 M/UL — LOW (ref 4.2–5.4)
RBC # BLD: 3.97 M/UL — LOW (ref 4.2–5.4)
RBC # FLD: 13.7 % — SIGNIFICANT CHANGE UP (ref 11.5–14.5)
RBC # FLD: 13.8 % — SIGNIFICANT CHANGE UP (ref 11.5–14.5)
RBC # FLD: 14 % — SIGNIFICANT CHANGE UP (ref 11.5–14.5)
RBC # FLD: 14.1 % — SIGNIFICANT CHANGE UP (ref 11.5–14.5)
RBC # FLD: 14.3 % — SIGNIFICANT CHANGE UP (ref 11.5–14.5)
RBC # FLD: 14.6 % — HIGH (ref 11.5–14.5)
RBC BLD AUTO: ABNORMAL
RSV RESULT: NEGATIVE — SIGNIFICANT CHANGE UP
RSV RNA RESP QL NAA+PROBE: NEGATIVE — SIGNIFICANT CHANGE UP
SAO2 % BLDV: 41 % — SIGNIFICANT CHANGE UP
SARS-COV-2 RNA SPEC QL NAA+PROBE: DETECTED
SMUDGE CELLS # BLD: PRESENT — SIGNIFICANT CHANGE UP
SODIUM SERPL-SCNC: 137 MMOL/L — SIGNIFICANT CHANGE UP (ref 135–146)
SODIUM SERPL-SCNC: 142 MMOL/L — SIGNIFICANT CHANGE UP (ref 135–146)
SODIUM SERPL-SCNC: 143 MMOL/L — SIGNIFICANT CHANGE UP (ref 135–146)
SODIUM SERPL-SCNC: 146 MMOL/L — SIGNIFICANT CHANGE UP (ref 135–146)
SODIUM SERPL-SCNC: 146 MMOL/L — SIGNIFICANT CHANGE UP (ref 135–146)
SODIUM SERPL-SCNC: 150 MMOL/L — HIGH (ref 135–146)
SODIUM SERPL-SCNC: 156 MMOL/L — HIGH (ref 135–146)
SODIUM SERPL-SCNC: 157 MMOL/L — HIGH (ref 135–146)
SODIUM SERPL-SCNC: 157 MMOL/L — HIGH (ref 135–146)
SODIUM SERPL-SCNC: 159 MMOL/L — HIGH (ref 135–146)
SPECIMEN SOURCE: SIGNIFICANT CHANGE UP
TARGETS BLD QL SMEAR: SLIGHT — SIGNIFICANT CHANGE UP
VARIANT LYMPHS # BLD: 6.2 % — HIGH (ref 0–5)
WBC # BLD: 11.93 K/UL — HIGH (ref 4.8–10.8)
WBC # BLD: 2.96 K/UL — LOW (ref 4.8–10.8)
WBC # BLD: 3.79 K/UL — LOW (ref 4.8–10.8)
WBC # BLD: 4.54 K/UL — LOW (ref 4.8–10.8)
WBC # BLD: 4.58 K/UL — LOW (ref 4.8–10.8)
WBC # BLD: 6.07 K/UL — SIGNIFICANT CHANGE UP (ref 4.8–10.8)
WBC # BLD: 8.4 K/UL — SIGNIFICANT CHANGE UP (ref 4.8–10.8)
WBC # BLD: 9.66 K/UL — SIGNIFICANT CHANGE UP (ref 4.8–10.8)
WBC # FLD AUTO: 11.93 K/UL — HIGH (ref 4.8–10.8)
WBC # FLD AUTO: 2.96 K/UL — LOW (ref 4.8–10.8)
WBC # FLD AUTO: 3.79 K/UL — LOW (ref 4.8–10.8)
WBC # FLD AUTO: 4.54 K/UL — LOW (ref 4.8–10.8)
WBC # FLD AUTO: 4.58 K/UL — LOW (ref 4.8–10.8)
WBC # FLD AUTO: 6.07 K/UL — SIGNIFICANT CHANGE UP (ref 4.8–10.8)
WBC # FLD AUTO: 8.4 K/UL — SIGNIFICANT CHANGE UP (ref 4.8–10.8)
WBC # FLD AUTO: 9.66 K/UL — SIGNIFICANT CHANGE UP (ref 4.8–10.8)

## 2020-01-01 PROCEDURE — 99233 SBSQ HOSP IP/OBS HIGH 50: CPT

## 2020-01-01 PROCEDURE — 99285 EMERGENCY DEPT VISIT HI MDM: CPT

## 2020-01-01 PROCEDURE — 71045 X-RAY EXAM CHEST 1 VIEW: CPT | Mod: 26

## 2020-01-01 PROCEDURE — 93010 ELECTROCARDIOGRAM REPORT: CPT

## 2020-01-01 PROCEDURE — 99239 HOSP IP/OBS DSCHRG MGMT >30: CPT

## 2020-01-01 PROCEDURE — 99223 1ST HOSP IP/OBS HIGH 75: CPT

## 2020-01-01 PROCEDURE — 99497 ADVNCD CARE PLAN 30 MIN: CPT | Mod: 25

## 2020-01-01 PROCEDURE — 99221 1ST HOSP IP/OBS SF/LOW 40: CPT

## 2020-01-01 PROCEDURE — 99231 SBSQ HOSP IP/OBS SF/LOW 25: CPT

## 2020-01-01 PROCEDURE — 99291 CRITICAL CARE FIRST HOUR: CPT

## 2020-01-01 RX ORDER — AMLODIPINE BESYLATE 2.5 MG/1
5 TABLET ORAL DAILY
Refills: 0 | Status: DISCONTINUED | OUTPATIENT
Start: 2020-01-01 | End: 2020-01-01

## 2020-01-01 RX ORDER — MORPHINE SULFATE 50 MG/1
2 CAPSULE, EXTENDED RELEASE ORAL ONCE
Refills: 0 | Status: DISCONTINUED | OUTPATIENT
Start: 2020-01-01 | End: 2020-01-01

## 2020-01-01 RX ORDER — PANTOPRAZOLE SODIUM 20 MG/1
40 TABLET, DELAYED RELEASE ORAL
Refills: 0 | Status: DISCONTINUED | OUTPATIENT
Start: 2020-01-01 | End: 2020-01-01

## 2020-01-01 RX ORDER — CEFTRIAXONE 500 MG/1
1000 INJECTION, POWDER, FOR SOLUTION INTRAMUSCULAR; INTRAVENOUS ONCE
Refills: 0 | Status: COMPLETED | OUTPATIENT
Start: 2020-01-01 | End: 2020-01-01

## 2020-01-01 RX ORDER — HYDROXYCHLOROQUINE SULFATE 200 MG
400 TABLET ORAL EVERY 12 HOURS
Refills: 0 | Status: COMPLETED | OUTPATIENT
Start: 2020-01-01 | End: 2020-01-01

## 2020-01-01 RX ORDER — CEFTRIAXONE 500 MG/1
INJECTION, POWDER, FOR SOLUTION INTRAMUSCULAR; INTRAVENOUS
Refills: 0 | Status: DISCONTINUED | OUTPATIENT
Start: 2020-01-01 | End: 2020-01-01

## 2020-01-01 RX ORDER — ACETAMINOPHEN 500 MG
2 TABLET ORAL
Qty: 0 | Refills: 0 | DISCHARGE
Start: 2020-01-01

## 2020-01-01 RX ORDER — SODIUM CHLORIDE 9 MG/ML
1000 INJECTION, SOLUTION INTRAVENOUS
Refills: 0 | Status: DISCONTINUED | OUTPATIENT
Start: 2020-01-01 | End: 2020-01-01

## 2020-01-01 RX ORDER — AZITHROMYCIN 500 MG/1
500 TABLET, FILM COATED ORAL ONCE
Refills: 0 | Status: COMPLETED | OUTPATIENT
Start: 2020-01-01 | End: 2020-01-01

## 2020-01-01 RX ORDER — LATANOPROST 0.05 MG/ML
1 SOLUTION/ DROPS OPHTHALMIC; TOPICAL
Qty: 0 | Refills: 0 | DISCHARGE

## 2020-01-01 RX ORDER — SODIUM CHLORIDE 9 MG/ML
1000 INJECTION INTRAMUSCULAR; INTRAVENOUS; SUBCUTANEOUS
Refills: 0 | Status: DISCONTINUED | OUTPATIENT
Start: 2020-01-01 | End: 2020-01-01

## 2020-01-01 RX ORDER — ACETAMINOPHEN 500 MG
650 TABLET ORAL EVERY 6 HOURS
Refills: 0 | Status: DISCONTINUED | OUTPATIENT
Start: 2020-01-01 | End: 2020-01-01

## 2020-01-01 RX ORDER — ACETAMINOPHEN 500 MG
975 TABLET ORAL ONCE
Refills: 0 | Status: COMPLETED | OUTPATIENT
Start: 2020-01-01 | End: 2020-01-01

## 2020-01-01 RX ORDER — FERROUS SULFATE 325(65) MG
1 TABLET ORAL
Qty: 0 | Refills: 0 | DISCHARGE

## 2020-01-01 RX ORDER — AMLODIPINE BESYLATE 2.5 MG/1
1 TABLET ORAL
Qty: 0 | Refills: 0 | DISCHARGE

## 2020-01-01 RX ORDER — MAGNESIUM SULFATE 500 MG/ML
2 VIAL (ML) INJECTION ONCE
Refills: 0 | Status: COMPLETED | OUTPATIENT
Start: 2020-01-01 | End: 2020-01-01

## 2020-01-01 RX ORDER — SODIUM CHLORIDE 9 MG/ML
2000 INJECTION INTRAMUSCULAR; INTRAVENOUS; SUBCUTANEOUS ONCE
Refills: 0 | Status: COMPLETED | OUTPATIENT
Start: 2020-01-01 | End: 2020-01-01

## 2020-01-01 RX ORDER — MEMANTINE HYDROCHLORIDE 10 MG/1
1 TABLET ORAL
Qty: 0 | Refills: 0 | DISCHARGE

## 2020-01-01 RX ORDER — CILOSTAZOL 100 MG/1
100 TABLET ORAL
Refills: 0 | Status: DISCONTINUED | OUTPATIENT
Start: 2020-01-01 | End: 2020-01-01

## 2020-01-01 RX ORDER — CARVEDILOL PHOSPHATE 80 MG/1
1 CAPSULE, EXTENDED RELEASE ORAL
Qty: 0 | Refills: 0 | DISCHARGE

## 2020-01-01 RX ORDER — ATORVASTATIN CALCIUM 80 MG/1
1 TABLET, FILM COATED ORAL
Qty: 0 | Refills: 0 | DISCHARGE

## 2020-01-01 RX ORDER — GABAPENTIN 400 MG/1
100 CAPSULE ORAL DAILY
Refills: 0 | Status: DISCONTINUED | OUTPATIENT
Start: 2020-01-01 | End: 2020-01-01

## 2020-01-01 RX ORDER — MORPHINE SULFATE 50 MG/1
1 CAPSULE, EXTENDED RELEASE ORAL ONCE
Refills: 0 | Status: DISCONTINUED | OUTPATIENT
Start: 2020-01-01 | End: 2020-01-01

## 2020-01-01 RX ORDER — ATORVASTATIN CALCIUM 80 MG/1
20 TABLET, FILM COATED ORAL AT BEDTIME
Refills: 0 | Status: DISCONTINUED | OUTPATIENT
Start: 2020-01-01 | End: 2020-01-01

## 2020-01-01 RX ORDER — HYDROXYCHLOROQUINE SULFATE 200 MG
200 TABLET ORAL EVERY 12 HOURS
Refills: 0 | Status: DISCONTINUED | OUTPATIENT
Start: 2020-01-01 | End: 2020-01-01

## 2020-01-01 RX ORDER — PANTOPRAZOLE SODIUM 20 MG/1
40 TABLET, DELAYED RELEASE ORAL DAILY
Refills: 0 | Status: DISCONTINUED | OUTPATIENT
Start: 2020-01-01 | End: 2020-01-01

## 2020-01-01 RX ORDER — AZITHROMYCIN 500 MG/1
TABLET, FILM COATED ORAL
Refills: 0 | Status: DISCONTINUED | OUTPATIENT
Start: 2020-01-01 | End: 2020-01-01

## 2020-01-01 RX ORDER — CARVEDILOL PHOSPHATE 80 MG/1
25 CAPSULE, EXTENDED RELEASE ORAL EVERY 12 HOURS
Refills: 0 | Status: DISCONTINUED | OUTPATIENT
Start: 2020-01-01 | End: 2020-01-01

## 2020-01-01 RX ORDER — CEFTRIAXONE 500 MG/1
1000 INJECTION, POWDER, FOR SOLUTION INTRAMUSCULAR; INTRAVENOUS EVERY 24 HOURS
Refills: 0 | Status: DISCONTINUED | OUTPATIENT
Start: 2020-01-01 | End: 2020-01-01

## 2020-01-01 RX ORDER — MORPHINE SULFATE 50 MG/1
2 CAPSULE, EXTENDED RELEASE ORAL
Refills: 0 | Status: DISCONTINUED | OUTPATIENT
Start: 2020-01-01 | End: 2020-01-01

## 2020-01-01 RX ORDER — HEPARIN SODIUM 5000 [USP'U]/ML
5000 INJECTION INTRAVENOUS; SUBCUTANEOUS EVERY 12 HOURS
Refills: 0 | Status: DISCONTINUED | OUTPATIENT
Start: 2020-01-01 | End: 2020-01-01

## 2020-01-01 RX ORDER — MEMANTINE HYDROCHLORIDE 10 MG/1
10 TABLET ORAL
Refills: 0 | Status: DISCONTINUED | OUTPATIENT
Start: 2020-01-01 | End: 2020-01-01

## 2020-01-01 RX ORDER — GABAPENTIN 400 MG/1
1 CAPSULE ORAL
Qty: 0 | Refills: 0 | DISCHARGE

## 2020-01-01 RX ORDER — HYDROXYCHLOROQUINE SULFATE 200 MG
TABLET ORAL
Refills: 0 | Status: DISCONTINUED | OUTPATIENT
Start: 2020-01-01 | End: 2020-01-01

## 2020-01-01 RX ORDER — CIPROFLOXACIN LACTATE 400MG/40ML
1 VIAL (ML) INTRAVENOUS
Qty: 0 | Refills: 0 | DISCHARGE

## 2020-01-01 RX ORDER — HEPARIN SODIUM 5000 [USP'U]/ML
5000 INJECTION INTRAVENOUS; SUBCUTANEOUS
Refills: 0 | Status: DISCONTINUED | OUTPATIENT
Start: 2020-01-01 | End: 2020-01-01

## 2020-01-01 RX ORDER — AZITHROMYCIN 500 MG/1
250 TABLET, FILM COATED ORAL EVERY 24 HOURS
Refills: 0 | Status: DISCONTINUED | OUTPATIENT
Start: 2020-01-01 | End: 2020-01-01

## 2020-01-01 RX ORDER — CILOSTAZOL 100 MG/1
1 TABLET ORAL
Qty: 0 | Refills: 0 | DISCHARGE

## 2020-01-01 RX ORDER — MORPHINE SULFATE 50 MG/1
2 CAPSULE, EXTENDED RELEASE ORAL
Qty: 100 | Refills: 0 | Status: DISCONTINUED | OUTPATIENT
Start: 2020-01-01 | End: 2020-01-01

## 2020-01-01 RX ADMIN — CILOSTAZOL 100 MILLIGRAM(S): 100 TABLET ORAL at 05:59

## 2020-01-01 RX ADMIN — CILOSTAZOL 100 MILLIGRAM(S): 100 TABLET ORAL at 17:16

## 2020-01-01 RX ADMIN — CEFTRIAXONE 100 MILLIGRAM(S): 500 INJECTION, POWDER, FOR SOLUTION INTRAMUSCULAR; INTRAVENOUS at 11:27

## 2020-01-01 RX ADMIN — PANTOPRAZOLE SODIUM 40 MILLIGRAM(S): 20 TABLET, DELAYED RELEASE ORAL at 14:28

## 2020-01-01 RX ADMIN — HEPARIN SODIUM 5000 UNIT(S): 5000 INJECTION INTRAVENOUS; SUBCUTANEOUS at 18:07

## 2020-01-01 RX ADMIN — CARVEDILOL PHOSPHATE 25 MILLIGRAM(S): 80 CAPSULE, EXTENDED RELEASE ORAL at 05:49

## 2020-01-01 RX ADMIN — MORPHINE SULFATE 2 MILLIGRAM(S): 50 CAPSULE, EXTENDED RELEASE ORAL at 11:27

## 2020-01-01 RX ADMIN — CILOSTAZOL 100 MILLIGRAM(S): 100 TABLET ORAL at 16:57

## 2020-01-01 RX ADMIN — ATORVASTATIN CALCIUM 20 MILLIGRAM(S): 80 TABLET, FILM COATED ORAL at 21:44

## 2020-01-01 RX ADMIN — MEMANTINE HYDROCHLORIDE 10 MILLIGRAM(S): 10 TABLET ORAL at 05:53

## 2020-01-01 RX ADMIN — HEPARIN SODIUM 5000 UNIT(S): 5000 INJECTION INTRAVENOUS; SUBCUTANEOUS at 18:56

## 2020-01-01 RX ADMIN — HEPARIN SODIUM 5000 UNIT(S): 5000 INJECTION INTRAVENOUS; SUBCUTANEOUS at 06:17

## 2020-01-01 RX ADMIN — PANTOPRAZOLE SODIUM 40 MILLIGRAM(S): 20 TABLET, DELAYED RELEASE ORAL at 05:15

## 2020-01-01 RX ADMIN — PANTOPRAZOLE SODIUM 40 MILLIGRAM(S): 20 TABLET, DELAYED RELEASE ORAL at 11:26

## 2020-01-01 RX ADMIN — Medication 400 MILLIGRAM(S): at 23:36

## 2020-01-01 RX ADMIN — MEMANTINE HYDROCHLORIDE 10 MILLIGRAM(S): 10 TABLET ORAL at 18:53

## 2020-01-01 RX ADMIN — PANTOPRAZOLE SODIUM 40 MILLIGRAM(S): 20 TABLET, DELAYED RELEASE ORAL at 05:49

## 2020-01-01 RX ADMIN — CARVEDILOL PHOSPHATE 25 MILLIGRAM(S): 80 CAPSULE, EXTENDED RELEASE ORAL at 17:41

## 2020-01-01 RX ADMIN — MEMANTINE HYDROCHLORIDE 10 MILLIGRAM(S): 10 TABLET ORAL at 05:15

## 2020-01-01 RX ADMIN — CILOSTAZOL 100 MILLIGRAM(S): 100 TABLET ORAL at 05:15

## 2020-01-01 RX ADMIN — CEFTRIAXONE 100 MILLIGRAM(S): 500 INJECTION, POWDER, FOR SOLUTION INTRAMUSCULAR; INTRAVENOUS at 11:14

## 2020-01-01 RX ADMIN — CILOSTAZOL 100 MILLIGRAM(S): 100 TABLET ORAL at 06:17

## 2020-01-01 RX ADMIN — MEMANTINE HYDROCHLORIDE 10 MILLIGRAM(S): 10 TABLET ORAL at 06:17

## 2020-01-01 RX ADMIN — MEMANTINE HYDROCHLORIDE 10 MILLIGRAM(S): 10 TABLET ORAL at 18:06

## 2020-01-01 RX ADMIN — CARVEDILOL PHOSPHATE 25 MILLIGRAM(S): 80 CAPSULE, EXTENDED RELEASE ORAL at 06:15

## 2020-01-01 RX ADMIN — CILOSTAZOL 100 MILLIGRAM(S): 100 TABLET ORAL at 18:06

## 2020-01-01 RX ADMIN — Medication 975 MILLIGRAM(S): at 14:22

## 2020-01-01 RX ADMIN — ATORVASTATIN CALCIUM 20 MILLIGRAM(S): 80 TABLET, FILM COATED ORAL at 21:57

## 2020-01-01 RX ADMIN — SODIUM CHLORIDE 75 MILLILITER(S): 9 INJECTION INTRAMUSCULAR; INTRAVENOUS; SUBCUTANEOUS at 00:15

## 2020-01-01 RX ADMIN — HEPARIN SODIUM 5000 UNIT(S): 5000 INJECTION INTRAVENOUS; SUBCUTANEOUS at 05:15

## 2020-01-01 RX ADMIN — Medication 25 GRAM(S): at 12:44

## 2020-01-01 RX ADMIN — CARVEDILOL PHOSPHATE 25 MILLIGRAM(S): 80 CAPSULE, EXTENDED RELEASE ORAL at 05:15

## 2020-01-01 RX ADMIN — ATORVASTATIN CALCIUM 20 MILLIGRAM(S): 80 TABLET, FILM COATED ORAL at 22:03

## 2020-01-01 RX ADMIN — Medication 200 MILLIGRAM(S): at 22:59

## 2020-01-01 RX ADMIN — CILOSTAZOL 100 MILLIGRAM(S): 100 TABLET ORAL at 18:53

## 2020-01-01 RX ADMIN — CARVEDILOL PHOSPHATE 25 MILLIGRAM(S): 80 CAPSULE, EXTENDED RELEASE ORAL at 05:59

## 2020-01-01 RX ADMIN — CARVEDILOL PHOSPHATE 25 MILLIGRAM(S): 80 CAPSULE, EXTENDED RELEASE ORAL at 16:57

## 2020-01-01 RX ADMIN — Medication 200 MILLIGRAM(S): at 21:57

## 2020-01-01 RX ADMIN — CILOSTAZOL 100 MILLIGRAM(S): 100 TABLET ORAL at 05:40

## 2020-01-01 RX ADMIN — MEMANTINE HYDROCHLORIDE 10 MILLIGRAM(S): 10 TABLET ORAL at 05:40

## 2020-01-01 RX ADMIN — MEMANTINE HYDROCHLORIDE 10 MILLIGRAM(S): 10 TABLET ORAL at 05:59

## 2020-01-01 RX ADMIN — CEFTRIAXONE 100 MILLIGRAM(S): 500 INJECTION, POWDER, FOR SOLUTION INTRAMUSCULAR; INTRAVENOUS at 12:46

## 2020-01-01 RX ADMIN — MEMANTINE HYDROCHLORIDE 10 MILLIGRAM(S): 10 TABLET ORAL at 05:49

## 2020-01-01 RX ADMIN — SODIUM CHLORIDE 2000 MILLILITER(S): 9 INJECTION INTRAMUSCULAR; INTRAVENOUS; SUBCUTANEOUS at 12:02

## 2020-01-01 RX ADMIN — MEMANTINE HYDROCHLORIDE 10 MILLIGRAM(S): 10 TABLET ORAL at 17:42

## 2020-01-01 RX ADMIN — HEPARIN SODIUM 5000 UNIT(S): 5000 INJECTION INTRAVENOUS; SUBCUTANEOUS at 05:59

## 2020-01-01 RX ADMIN — AMLODIPINE BESYLATE 5 MILLIGRAM(S): 2.5 TABLET ORAL at 05:59

## 2020-01-01 RX ADMIN — CARVEDILOL PHOSPHATE 25 MILLIGRAM(S): 80 CAPSULE, EXTENDED RELEASE ORAL at 18:06

## 2020-01-01 RX ADMIN — ATORVASTATIN CALCIUM 20 MILLIGRAM(S): 80 TABLET, FILM COATED ORAL at 23:34

## 2020-01-01 RX ADMIN — CILOSTAZOL 100 MILLIGRAM(S): 100 TABLET ORAL at 06:15

## 2020-01-01 RX ADMIN — MEMANTINE HYDROCHLORIDE 10 MILLIGRAM(S): 10 TABLET ORAL at 16:57

## 2020-01-01 RX ADMIN — PANTOPRAZOLE SODIUM 40 MILLIGRAM(S): 20 TABLET, DELAYED RELEASE ORAL at 05:54

## 2020-01-01 RX ADMIN — HEPARIN SODIUM 5000 UNIT(S): 5000 INJECTION INTRAVENOUS; SUBCUTANEOUS at 05:49

## 2020-01-01 RX ADMIN — PANTOPRAZOLE SODIUM 40 MILLIGRAM(S): 20 TABLET, DELAYED RELEASE ORAL at 06:00

## 2020-01-01 RX ADMIN — CARVEDILOL PHOSPHATE 25 MILLIGRAM(S): 80 CAPSULE, EXTENDED RELEASE ORAL at 18:07

## 2020-01-01 RX ADMIN — CARVEDILOL PHOSPHATE 25 MILLIGRAM(S): 80 CAPSULE, EXTENDED RELEASE ORAL at 18:53

## 2020-01-01 RX ADMIN — HEPARIN SODIUM 5000 UNIT(S): 5000 INJECTION INTRAVENOUS; SUBCUTANEOUS at 16:57

## 2020-01-01 RX ADMIN — ATORVASTATIN CALCIUM 20 MILLIGRAM(S): 80 TABLET, FILM COATED ORAL at 21:37

## 2020-01-01 RX ADMIN — PANTOPRAZOLE SODIUM 40 MILLIGRAM(S): 20 TABLET, DELAYED RELEASE ORAL at 06:17

## 2020-01-01 RX ADMIN — SODIUM CHLORIDE 50 MILLILITER(S): 9 INJECTION INTRAMUSCULAR; INTRAVENOUS; SUBCUTANEOUS at 15:50

## 2020-01-01 RX ADMIN — SODIUM CHLORIDE 100 MILLILITER(S): 9 INJECTION INTRAMUSCULAR; INTRAVENOUS; SUBCUTANEOUS at 05:27

## 2020-01-01 RX ADMIN — SODIUM CHLORIDE 75 MILLILITER(S): 9 INJECTION INTRAMUSCULAR; INTRAVENOUS; SUBCUTANEOUS at 18:56

## 2020-01-01 RX ADMIN — HEPARIN SODIUM 5000 UNIT(S): 5000 INJECTION INTRAVENOUS; SUBCUTANEOUS at 05:40

## 2020-01-01 RX ADMIN — MORPHINE SULFATE 1 MILLIGRAM(S): 50 CAPSULE, EXTENDED RELEASE ORAL at 18:11

## 2020-01-01 RX ADMIN — ATORVASTATIN CALCIUM 20 MILLIGRAM(S): 80 TABLET, FILM COATED ORAL at 21:38

## 2020-01-01 RX ADMIN — MEMANTINE HYDROCHLORIDE 10 MILLIGRAM(S): 10 TABLET ORAL at 06:15

## 2020-01-01 RX ADMIN — AZITHROMYCIN 255 MILLIGRAM(S): 500 TABLET, FILM COATED ORAL at 11:13

## 2020-01-01 RX ADMIN — CEFTRIAXONE 100 MILLIGRAM(S): 500 INJECTION, POWDER, FOR SOLUTION INTRAMUSCULAR; INTRAVENOUS at 14:24

## 2020-01-01 RX ADMIN — MEMANTINE HYDROCHLORIDE 10 MILLIGRAM(S): 10 TABLET ORAL at 17:16

## 2020-01-01 RX ADMIN — HEPARIN SODIUM 5000 UNIT(S): 5000 INJECTION INTRAVENOUS; SUBCUTANEOUS at 17:42

## 2020-01-01 RX ADMIN — PANTOPRAZOLE SODIUM 40 MILLIGRAM(S): 20 TABLET, DELAYED RELEASE ORAL at 05:40

## 2020-01-01 RX ADMIN — AMLODIPINE BESYLATE 5 MILLIGRAM(S): 2.5 TABLET ORAL at 06:17

## 2020-01-01 RX ADMIN — GABAPENTIN 100 MILLIGRAM(S): 400 CAPSULE ORAL at 11:14

## 2020-01-01 RX ADMIN — AZITHROMYCIN 250 MILLIGRAM(S): 500 TABLET, FILM COATED ORAL at 07:56

## 2020-01-01 RX ADMIN — CARVEDILOL PHOSPHATE 25 MILLIGRAM(S): 80 CAPSULE, EXTENDED RELEASE ORAL at 17:15

## 2020-01-01 RX ADMIN — AMLODIPINE BESYLATE 5 MILLIGRAM(S): 2.5 TABLET ORAL at 06:15

## 2020-01-01 RX ADMIN — CARVEDILOL PHOSPHATE 25 MILLIGRAM(S): 80 CAPSULE, EXTENDED RELEASE ORAL at 05:54

## 2020-01-01 RX ADMIN — SODIUM CHLORIDE 50 MILLILITER(S): 9 INJECTION, SOLUTION INTRAVENOUS at 17:15

## 2020-01-01 RX ADMIN — AMLODIPINE BESYLATE 5 MILLIGRAM(S): 2.5 TABLET ORAL at 05:53

## 2020-01-01 RX ADMIN — CILOSTAZOL 100 MILLIGRAM(S): 100 TABLET ORAL at 05:53

## 2020-01-01 RX ADMIN — AMLODIPINE BESYLATE 5 MILLIGRAM(S): 2.5 TABLET ORAL at 05:15

## 2020-01-01 RX ADMIN — HEPARIN SODIUM 5000 UNIT(S): 5000 INJECTION INTRAVENOUS; SUBCUTANEOUS at 18:06

## 2020-01-01 RX ADMIN — AMLODIPINE BESYLATE 5 MILLIGRAM(S): 2.5 TABLET ORAL at 05:40

## 2020-01-01 RX ADMIN — Medication 400 MILLIGRAM(S): at 13:57

## 2020-01-01 RX ADMIN — CILOSTAZOL 100 MILLIGRAM(S): 100 TABLET ORAL at 17:41

## 2020-01-01 RX ADMIN — HEPARIN SODIUM 5000 UNIT(S): 5000 INJECTION INTRAVENOUS; SUBCUTANEOUS at 17:17

## 2020-01-01 RX ADMIN — AZITHROMYCIN 250 MILLIGRAM(S): 500 TABLET, FILM COATED ORAL at 12:14

## 2020-01-01 RX ADMIN — HEPARIN SODIUM 5000 UNIT(S): 5000 INJECTION INTRAVENOUS; SUBCUTANEOUS at 16:28

## 2020-01-01 RX ADMIN — CILOSTAZOL 100 MILLIGRAM(S): 100 TABLET ORAL at 05:49

## 2020-01-01 RX ADMIN — CILOSTAZOL 100 MILLIGRAM(S): 100 TABLET ORAL at 18:07

## 2020-01-01 RX ADMIN — CARVEDILOL PHOSPHATE 25 MILLIGRAM(S): 80 CAPSULE, EXTENDED RELEASE ORAL at 05:40

## 2020-01-01 RX ADMIN — AMLODIPINE BESYLATE 5 MILLIGRAM(S): 2.5 TABLET ORAL at 05:49

## 2020-01-01 RX ADMIN — GABAPENTIN 100 MILLIGRAM(S): 400 CAPSULE ORAL at 12:55

## 2020-01-01 RX ADMIN — HEPARIN SODIUM 5000 UNIT(S): 5000 INJECTION INTRAVENOUS; SUBCUTANEOUS at 05:54

## 2020-01-01 RX ADMIN — Medication 200 MILLIGRAM(S): at 11:14

## 2020-01-01 RX ADMIN — PANTOPRAZOLE SODIUM 40 MILLIGRAM(S): 20 TABLET, DELAYED RELEASE ORAL at 06:15

## 2020-01-01 RX ADMIN — CARVEDILOL PHOSPHATE 25 MILLIGRAM(S): 80 CAPSULE, EXTENDED RELEASE ORAL at 06:17

## 2020-03-25 NOTE — ED ADULT NURSE NOTE - OBJECTIVE STATEMENT
as per home health aide with patient, patient has been more lethargic over the past few days and has been having low blood pressure. aide states b/p this morning was 89/43. aide also states patient is having some difficulty walking.

## 2020-03-25 NOTE — ED PROVIDER NOTE - PHYSICAL EXAMINATION
GENERAL: Well-nourished, Well-developed. NAD.  Eyes: PERRLA, EOMI. Pink conjunctiva B/L. No asymmetry. No nystagmus. No conjunctival injection. Non-icteric sclera.  ENMT: MMM.   Neck: FROM  CVS: No reproducible chest wall tenderness. Normal S1,S2. No murmurs appreciated on auscultation   RESP: No use of accessory muscles. Chest rise symmetrical with good expansion. Lungs clear to auscultation B/L. No wheezing, rales, or rhonchi auscultated.  GI: Normal auscultation of bowel sounds in all 4 quadrants. Soft, Nontender,Nondistended. No guarding   MSK: FROM of upper and lower extremities B/L. No midline spinal TTP.   Skin: Warm, Dry. No rashes or lesions. Good cap refill < 2 sec B/L.  EXT: Radial and pedal pulses present B/L.   Neuro: AA&O x 3. CNs II-XII grossly intact. Mini mental status exam intact. Speaking in full sentences. No slurring of speech. No facial droop. No tremors. Sensation grossly intact. Strength 5/5 B/L.   Psych:  Appropriate mood and affect. Cooperative. Calm

## 2020-03-25 NOTE — ED PROVIDER NOTE - CLINICAL SUMMARY MEDICAL DECISION MAKING FREE TEXT BOX
89yo F history of HTN CAD PCI CKD 4 presenting with generalized weakness, cough x2d getting worse to today, difficulty getting out of bed. Family also notes low BP at home. No nausea/vomiting/diarrhea abdominal pain, chest pain. labs ekg imaging reviewed. will admit

## 2020-03-25 NOTE — ED ADULT NURSE NOTE - NSIMPLEMENTINTERV_GEN_ALL_ED
Implemented All Fall with Harm Risk Interventions:  [place] to call system. Call bell, personal items and telephone within reach. Instruct patient to call for assistance. Room bathroom lighting operational. Non-slip footwear when patient is off stretcher. Physically safe environment: no spills, clutter or unnecessary equipment. Stretcher in lowest position, wheels locked, appropriate side rails in place. Provide visual cue, wrist band, yellow gown, etc. Monitor gait and stability. Monitor for mental status changes and reorient to person, place, and time. Review medications for side effects contributing to fall risk. Reinforce activity limits and safety measures with patient and family. Provide visual clues: red socks.

## 2020-03-25 NOTE — H&P ADULT - ASSESSMENT
89 yo F with PMH of CAD s/p PCI, PVD, HTN, CKD4 presented to ED complaining of generalized weakness and cough of 2 days duration.    #) Weakness + cough + fever 2/2 suspected COVID-19  - Flu + RVP -ve  - CXR=mild increase pulmonary vascular markings + mild bibasilar atelectasis  - COVID swab sent - pending  - Airborne + contact precautions until above returns  - saturating well on RA  - will check CRP + procalcitonin    #) MARILU on CKD4  - likely pre-renal 2/2 decreased PO intake  - s/p 2L in ED  - will check urine lytes  - c/w IVF overnight NS @ 75cc/hr  - repeat BMP in AM    #) CAD s/p PCI - c/w ASA + BB + statin    #) PVD - c/w Cilostazol    #) HTN - BP stable in ED, c/w home meds (BB + Norvasc)    DVT ppx: Heparin subQ  Diet: DASH/renal  Activity: OOBTC  Code status: FULL  Dispo: pending - from home 89 yo F with PMH of CAD s/p PCI, PVD, HTN, CKD4 presented to ED complaining of generalized weakness and cough of 2 days duration.    #) Weakness + cough + fever 2/2 suspected COVID-19  - Flu + RVP -ve  - CXR=mild increase pulmonary vascular markings + mild bibasilar atelectasis  - COVID swab sent - pending  - Airborne + contact precautions until above returns  - saturating well on RA  - will check CRP + procalcitonin    #) MARILU on CKD4  - likely pre-renal 2/2 decreased PO intake  - s/p 2L in ED  - will check urine lytes  - c/w IVF overnight NS @ 75cc/hr  - repeat BMP in AM    #) CAD s/p PCI - c/w BB + statin, not on ASA?    #) PVD - c/w Cilostazol    #) HTN - BP stable in ED, will hold ACE + HCTZ for now, c/w Norvasc + BB    DVT ppx: Heparin subQ  Diet: DASH/renal  Activity: OOBTC  Code status: FULL  Dispo: pending - from home    PLEASE INFORM DAUGHTER DI OF ANY UPDATES/CHANGES IN CLINICAL STATUS - PHONE #[phone] 89 yo F with PMH of CAD s/p PCI, PVD, HTN, CKD4 presented to ED complaining of generalized weakness and cough of 2 days duration.    #) Weakness + cough + fever 2/2 suspected COVID-19  - Flu + RVP -ve  - CXR=mild increase pulmonary vascular markings + mild bibasilar atelectasis  - COVID swab sent - pending  - Airborne + contact precautions until above returns  - saturating well on RA  - will check CRP + procalcitonin    #) MARILU on CKD4  - likely pre-renal 2/2 decreased PO intake  - s/p 2L in ED  - will check urine lytes  - c/w IVF overnight NS @ 75cc/hr  - repeat BMP in AM    #) CAD s/p PCI - c/w BB + statin, not on ASA    #) PVD - c/w Cilostazol    #) HTN - BP stable in ED, will hold ACE + HCTZ for now, c/w Norvasc + BB    DVT ppx: Heparin subQ  Diet: DASH/renal  Activity: OOBTC  Code status: FULL  Dispo: pending - from home    PLEASE INFORM DAUGHTER DI OF ANY UPDATES/CHANGES IN CLINICAL STATUS - PHONE #[phone] 89 yo F with PMH of CAD s/p PCI, PVD, HTN, CKD4 presented to ED complaining of generalized weakness and cough of 2 days duration.    #) Weakness + cough + fever 2/2 suspected COVID-19  - Flu + RVP -ve  - CXR=mild increase pulmonary vascular markings + mild bibasilar atelectasis  - COVID swab sent - pending  - Airborne + contact precautions until above returns  - saturating well on RA  - will check CRP + procalcitonin    #) MARILU on CKD4  - likely pre-renal 2/2 decreased PO intake  - s/p 2L in ED  - will check urine lytes  - c/w IVF overnight NS @ 75cc/hr  - repeat BMP in AM    #) CAD s/p PCI - c/w BB + statin, not on ASA    #) PVD - c/w Cilostazol    #) HTN - BP stable in ED, will hold ACE + HCTZ for now, c/w Norvasc + BB    DVT ppx: Heparin subQ  Diet: DASH/renal  Activity: OOBTC  Code status: DNR/DNI - discussed in detail with patient, MOLST form filled out and placed in chart  Dispo: pending - from home    PLEASE INFORM DAUGHTER DI OF ANY UPDATES/CHANGES IN CLINICAL STATUS - PHONE #[phone]

## 2020-03-25 NOTE — ED PROVIDER NOTE - NS ED ROS FT
Constitutional: (-) fever (+) malaise (-) diaphoresis (-) chills  ENMT: (-) nasal or chest congestion (-) sore throat  Cardiac: (-) chest pain  (-) palpitations (-) syncope  Respiratory: (+) cough (-) hemoptysis (-) history of asthma or COPD (-) SOB (-) BOOTHE  GI: (-) nausea (-) vomiting (-) diarrhea (-) abdominal pain   : (-) dysuria   MS: (-) back pain (+) myalgia  Neuro: (-) headache (-) dizziness (-) numbness/tingling to extremities B/L (+) generalized weakness   Skin: (-) rash   Except as documented in the HPI, all other systems are negative.

## 2020-03-25 NOTE — ED PROVIDER NOTE - ATTENDING CONTRIBUTION TO CARE
89yo F history of HTN CAD PCI CKD 4 presenting with generalized weakness, cough x2d getting worse to today, difficulty getting out of bed. Family also notes low BP at home. No nausea/vomiting/diarrhea abdominal pain, chest pain.   Constitutional:  Non toxic.   Eyes: PERRLA. Extraocular movements intact, no entrapment. Conjunctiva normal.   ENT: No nasal discharge. dry mucus membranes.  Neck: Supple, non tender, full range of motion.  CV: RRR no murmurs, rubs, or gallops. +S1S2.   Pulm: rhonchi bilaterally. Normal work of breathing.  Abd: soft NT ND +BS.   Ext: Warm and well perfused x4, moving all extremities, no edema.   Psy: Cooperative, appropriate.   Skin: Warm, dry, no rash  Neuro: CN2-12 grossly intact no sensory or motor deficits throughout, no drift

## 2020-03-25 NOTE — ED PROVIDER NOTE - OBJECTIVE STATEMENT
89 yo female with PMH of CKD, 3 cardiac stent, HTN presents for evaluation of fluctuating lower blood pressure, generalized weakness, cough x 89 yo female with PMH of CKD, 3 cardiac stent, HTN presents for evaluation of fluctuating lower blood pressure, generalized weakness, cough x  gradual onset, moderate. 89 yo female with PMH of CKD, 3 cardiac stent, HTN presents for evaluation of fluctuating lower blood pressure, generalized weakness, cough x 1 week. Aide states patient has been more tired then normal and sleeping more often.   Last two checks of BP yesterday were 80s/40s.  Normal BP is 110-140/50-60s. Aide states BP has been fluctuating. PCP: Dr. Moore. Denies fever, chills, chest pain, SOB, known COVID exposure. Has been taking delsym at home  gradual onset, moderate.

## 2020-03-25 NOTE — H&P ADULT - HISTORY OF PRESENT ILLNESS
89 yo F with PMH of CAD s/p PCI, PVD, HTN, CKD4 presented to ED complaining of generalized weakness and cough of 2 days duration. Reports difficulty ambulating and getting out of bed. Family also reports low BP at home. Deny any SOB, N/V/C/D, chest pain, dysuria, or other complaint. 89 yo F with PMH of CAD s/p PCI, PVD, HTN, CKD4 presented to ED complaining of generalized weakness and dry cough of 2 days duration. Collateral history obtained from daughter over phone. Reports difficulty ambulating and getting out of bed. All symptoms started 2 days ago. Family also reports low BP at home and one episode of diarrhea. Deny any SOB, chills, myalgias N/V/C, chest pain, dysuria, changes in taste or smell, or other complaint. Deny fevers but also state they did not check temperature (patient did not feel febrile). Report nearly normal PO and fluid intake.  Has HHA 7 hours daily. No known exposure to COVID, no recent travel. 89 yo F with PMH of CAD s/p PCI, PVD, HTN, CKD4 presented to ED complaining of generalized weakness and dry cough of 2 days duration. Collateral history obtained from daughter over phone. Reports difficulty ambulating and getting out of bed. All symptoms started 2 days ago. Family also reports low BP at home and multiple episodes of diarrhea. Deny any SOB, chills, myalgias N/V/C, chest pain, dysuria, changes in taste or smell, or other complaint. Deny fevers but also state they did not check temperature (patient did not feel febrile). Report nearly normal PO and fluid intake.  Has HHA 7 hours daily. No known exposure to COVID, no recent travel.

## 2020-03-25 NOTE — H&P ADULT - NSHPLABSRESULTS_GEN_ALL_CORE
9.9    3.79  )-----------( 197      ( 25 Mar 2020 12:18 )             27.9     03-25    137  |  105  |  84<HH>  ----------------------------<  129<H>  4.5   |  20  |  4.2<HH>    Ca    8.3<L>      25 Mar 2020 12:18    TPro  6.5  /  Alb  3.3<L>  /  TBili  0.4  /  DBili  x   /  AST  36  /  ALT  20  /  AlkPhos  63  03-25        PT/INR - ( 25 Mar 2020 12:18 )   PT: 11.80 sec;   INR: 1.03 ratio      PTT - ( 25 Mar 2020 12:18 )  PTT:31.9 sec    < from: Xray Chest 1 View-PORTABLE IMMEDIATE (03.25.20 @ 13:14) >    Impression:      Mild increase in pulmonary vascular markings and mild bibasilar atelectasis.

## 2020-03-25 NOTE — H&P ADULT - ATTENDING COMMENTS
Patient seen and examined independently. Resident's H & P reviewed. Agree with the findings and plan of care except,    A 89 years old male presented to the ED with generalized weakness and dry cough for the last two days. As per family pt had multiple episodes of diarrhea and low BP at home. No fever.     WBC: 2.96  No Lymphopenia  BUN/Cr. 84/4.2  Flu & RSV: Negative.   EKG: NSR @ 82/min. PVC'S. (Interpreted by me. )    ASSESSMENT:     1. MARILU Patient seen and examined independently. Resident's H & P reviewed. Agree with the findings and plan of care except,    A 89 years old male presented to the ED with generalized weakness and dry cough for the last two days. As per family pt had multiple episodes of diarrhea and low BP at home. No fever. No sob.   Pt states that she had two loose BM today.     WBC: 2.96  No Lymphopenia  BUN/Cr. 84/4.2  Flu & RSV: Negative.   EKG: NSR @ 82/min. PVC'S. (Interpreted by me. )  COVID-19 positive    ASSESSMENT:     1. MARILU likely prerenal / CKD-4  2. Viral PNA due to COVID-19  3. PVD  4. CAD  5. HTN  6. Diarrhea due to COVID-19    PLAN:    . Mild disease. Hold on starting Hydroxychloroquine  . Will call for ID eval  . Increase IVF to  cc/hr  . Check i's and o's   . Monitor renal function  . Repeat CXR in AM  . Cont her home meds.   . Called her daughter and informed her about pt's condition

## 2020-03-26 NOTE — PROGRESS NOTE ADULT - SUBJECTIVE AND OBJECTIVE BOX
SUBJECTIVE:    Patient is a 89y old Female who presents with a chief complaint of MARILU on CKD (25 Mar 2020 13:52)    Currently admitted to medicine with the primary diagnosis of Dehydration     Today is hospital day 1d. No acute events overnight. Patient afebrile and continues to saturate well on room air.     PAST MEDICAL & SURGICAL HISTORY  PVD (peripheral vascular disease)  Chronic kidney disease (CKD)  CAD (coronary artery disease)  S/P cholecystectomy    SOCIAL HISTORY:  Negative for smoking/alcohol/drug use.     ALLERGIES:  No Known Allergies    MEDICATIONS:  STANDING MEDICATIONS  acetaminophen   Tablet .. 650 milliGRAM(s) Oral every 6 hours PRN Temp greater or equal to 38.5C (101.3F)  amLODIPine   Tablet 5 milliGRAM(s) Oral daily  atorvastatin 20 milliGRAM(s) Oral at bedtime  carvedilol 25 milliGRAM(s) Oral every 12 hours  cilostazol 100 milliGRAM(s) Oral two times a day  heparin  Injectable 5000 Unit(s) SubCutaneous two times a day  memantine 10 milliGRAM(s) Oral two times a day  pantoprazole    Tablet 40 milliGRAM(s) Oral before breakfast  sodium chloride 0.9%. 1000 milliLiter(s) (100 mL/Hr) IV Continuous <Continuous>    PRN MEDICATIONS  acetaminophen   Tablet .. 650 milliGRAM(s) Oral every 6 hours PRN    VITALS:   T(F): 97.8  HR: 69 (69 - 88)  BP: 119/59 (119/59 - 136/62)  RR: 18 (18 - 18)  SpO2: 96% (96% - 96%)    LABS:                        9.5    2.96  )-----------( 173      ( 26 Mar 2020 05:40 )             26.3     03-26    143  |  110  |  69<HH>  ----------------------------<  91  4.4   |  20  |  2.9<H>    Ca    8.1<L>      26 Mar 2020 05:40  Phos  3.8     03-26  Mg     2.0     03-26    TPro  5.7<L>  /  Alb  2.9<L>  /  TBili  0.4  /  DBili  x   /  AST  39  /  ALT  20  /  AlkPhos  55  03-26    PT/INR - ( 25 Mar 2020 12:18 )   PT: 11.80 sec;   INR: 1.03 ratio         PTT - ( 25 Mar 2020 12:18 )  PTT:31.9 sec      Serum Pro-Brain Natriuretic Peptide: 6 pg/mL (03-25-20 @ 12:18)      PHYSICAL EXAM per attending physician.

## 2020-03-26 NOTE — PROGRESS NOTE ADULT - ASSESSMENT
89 yo F with PMH of CAD s/p PCI, PVD, HTN, CKD4 presented to ED complaining of generalized weakness and cough of 2 days duration.    #) Weakness + cough + fever 2/2 suspected COVID-19  - Flu + RVP -ve  - CXR on admission: mild increase pulmonary vascular markings + mild bibasilar atelectasis  - CXR 3/26 increased R sided opacities  - COVID swab sent - pending  - Airborne + contact precautions until above returns  - saturating well on RA  - will check CRP + procalcitonin - pending  - blood cultures pending   - BNP 6  - no hydroxychloroquine for now    #) MARILU on CKD4; improving w/ fluids   - likely pre-renal 2/2 decreased PO intake  - s/p 2L in ED  - will check urine lytes  - c/w IVF NS @ 100cc/hr  - CR improved today - continue to monitor daily     #) CAD s/p PCI   - c/w BB + statin, not on ASA    #) PVD   - c/w Cilostazol    #) HTN   - BP stable in ED, will hold ACE + HCTZ for now  - c/w Norvasc + BB    DVT ppx: Heparin subQ  Diet: DASH/renal  Activity: OOBTC  Code status: DNR/DNI   Dispo: pending - from home    PLEASE INFORM DAUGHTER DI OF ANY UPDATES/CHANGES IN CLINICAL STATUS - PHONE #[phone]

## 2020-03-27 NOTE — CONSULT NOTE ADULT - ASSESSMENT
ASSESSMENT  89 yo F with PMH of CAD s/p PCI, PVD, HTN, CKD4 presented to ED complaining of generalized weakness and dry cough of 2 days duration and diarrhea      IMPRESSION  # COVID-19 PNA    CXR Increasing right-sided interstitial/airspace opacities.    low ABS lymphocytes  #BCX GPR 1/2 possible contaminant   #Diarrhea  #QTC Calculation(Bezet) 472 ms  #Sepsis ruled out on admission     RECOMMENDATIONS  - REPEAT blood cultures  - f/u GPR speciation  - if any worsening hypoxemia, start plaquenil    Spectra [id number]

## 2020-03-27 NOTE — DIETITIAN INITIAL EVALUATION ADULT. - DIET TYPE
Unable to obtain nutr hx, pt. on strict isolation precautions d/t COVID 19+. No answer from phone call to daughter. Per H&P normal PO intake PTP.  No weight loss noted when compare weights to previous admit. NKFA per EMR.

## 2020-03-27 NOTE — PROGRESS NOTE ADULT - SUBJECTIVE AND OBJECTIVE BOX
ESSIE ARMENDARIZ  89y Female    CHIEF COMPLAINT:    Patient is a 89y old  Female who presents with a chief complaint of MARILU on CKD (27 Mar 2020 14:27)      INTERVAL HPI/OVERNIGHT EVENTS:    Patient seen and examined. Had one loose BM today. No fever. No sob. No cough.     ROS: All other systems are negative.    Vital Signs:    T(F): 98.7 (20 @ 13:40), Max: 99 (20 @ 06:06)  HR: 117 (20 @ 14:24) (72 - 117)  BP: 149/70 (20 @ 13:40) (116/59 - 162/62)  RR: 18 (20 @ 13:40) (16 - 18)  SpO2: 96% (20 @ 09:00) (94% - 96%)  I&O's Summary    26 Mar 2020 07:  -  27 Mar 2020 07:00  --------------------------------------------------------  IN: 1525 mL / OUT: 900 mL / NET: 625 mL    27 Mar 2020 07:01  -  27 Mar 2020 16:08  --------------------------------------------------------  IN: 1280 mL / OUT: 500 mL / NET: 780 mL      Daily Height in cm: 152.4 (27 Mar 2020 13:32)    Daily Weight in k.4 (27 Mar 2020 06:06)  CAPILLARY BLOOD GLUCOSE          PHYSICAL EXAM:    GENERAL:  NAD  SKIN: No rashes or lesions  HENT: Atraumatic Normocephalic. PERRL. Moist membranes.  NECK: Supple, No JVD. No lymphadenopathy.  PULMONARY: CTA B/L. No wheezing. No rales  CVS: Normal S1, S2. Rate and Rhythm are regular. No murmurs.  ABDOMEN/GI: Soft, Nontender, Nondistended; BS present  EXTREMITIES: Peripheral pulses intact. No edema B/L LE.  NEUROLOGIC:  No motor or sensory deficit.  PSYCH: Alert & oriented x 3    Consultant(s) Notes Reviewed:  [x ] YES  [ ] NO  Care Discussed with Consultants/Other Providers [ x] YES  [ ] NO    EKG reviewed  Telemetry reviewed    LABS:                        9.6    4.54  )-----------( 213      ( 27 Mar 2020 05:04 )             27.3     03-27    142  |  113<H>  |  54<H>  ----------------------------<  99  4.0   |  19  |  2.3<H>    Ca    8.3<L>      27 Mar 2020 05:04  Phos  3.8       Mg     1.9         TPro  5.8<L>  /  Alb  2.9<L>  /  TBili  0.3  /  DBili  x   /  AST  37  /  ALT  20  /  AlkPhos  61        Serum Pro-Brain Natriuretic Peptide: 1004 pg/mL (20 @ 05:04)  Serum Pro-Brain Natriuretic Peptide: 1557 pg/mL (20 @ 11:28)  Serum Pro-Brain Natriuretic Peptide: 6 pg/mL (20 @ 12:18)        Culture - Blood (collected 25 Mar 2020 12:06)  Source: .Blood Blood-Peripheral  Preliminary Report (26 Mar 2020 22:01):    No growth to date.    Culture - Blood (collected 25 Mar 2020 12:06)  Source: .Blood Blood-Peripheral  Gram Stain (27 Mar 2020 11:58):    Growth in aerobic bottle:    Gram Positive Rods  Preliminary Report (27 Mar 2020 11:59):    Growth in aerobic bottle:    Gram Positive Rods        RADIOLOGY & ADDITIONAL TESTS:    < from: Xray Chest 1 View- PORTABLE-Routine (20 @ 08:33) >    Impression:      Stable right-sided interstitial/airspace opacities.      < end of copied text >    Imaging or report Personally Reviewed:  [ ] YES  [ ] NO    Medications:  Standing  amLODIPine   Tablet 5 milliGRAM(s) Oral daily  atorvastatin 20 milliGRAM(s) Oral at bedtime  carvedilol 25 milliGRAM(s) Oral every 12 hours  cilostazol 100 milliGRAM(s) Oral two times a day  heparin  Injectable 5000 Unit(s) SubCutaneous two times a day  memantine 10 milliGRAM(s) Oral two times a day  pantoprazole    Tablet 40 milliGRAM(s) Oral before breakfast  sodium chloride 0.9%. 1000 milliLiter(s) IV Continuous <Continuous>    PRN Meds  acetaminophen   Tablet .. 650 milliGRAM(s) Oral every 6 hours PRN      Case discussed with resident    Care discussed with pt/family

## 2020-03-27 NOTE — PROGRESS NOTE ADULT - ASSESSMENT
A 89 years old male presented to the ED with generalized weakness and dry cough for the last two days. As per family pt had multiple episodes of diarrhea and low BP at home. No fever. No sob.   Pt states that she had two loose BM today.     WBC: 2.96  No Lymphopenia  BUN/Cr. 84/4.2  Flu & RSV: Negative.   EKG: NSR @ 82/min. PVC'S. (Interpreted by me. )  COVID-19 positive    ASSESSMENT:     1. MARILU likely prerenal / CKD-4  2. Viral PNA due to COVID-19  3. PVD  4. CAD  5. HTN  6. Diarrhea due to COVID-19  7. Gram negative bacteremia likely contamination  8. No sepsis on admission         PLAN:    ·	CXR showed Rt sided airspace/interstial opacities.   ·	Pt is asymptomatic. No sob. No cough. No fever. Having mild disease  ·	Will hold on Hydroxychloroquine. If the symptoms get worse will start her on Hydroxychloroquine.   ·	Blood cx growing GNR. Likely contamination. ID eval noted. Recommended to repeat blood cx  ·	Decrease IVF to 75 cc/hr  ·	Renal function improving. Monitor BMP   ·	Repeat CXR in AM  ·	Cont her home meds  ·	Called pt's daughter and gave her update about pt's condition and possible D/C tomorrow.

## 2020-03-27 NOTE — DIETITIAN INITIAL EVALUATION ADULT. - OTHER INFO
P/w: MARILU on CKD, diarrhea, dehydration. ) Weakness + cough + fever 2/2 COVID-19. MARILU on CKD4; improving w/ fluids. HTN: stable. DNR/DNI. Pending d/c home.

## 2020-03-27 NOTE — PROGRESS NOTE ADULT - SUBJECTIVE AND OBJECTIVE BOX
SUBJECTIVE:    Patient is a 89y old Female who presents with a chief complaint of MARILU on CKD (26 Mar 2020 11:01)    Currently admitted to medicine with the primary diagnosis of Dehydration     Today is hospital day 2d. Patient was afebrile overnight, and has been saturating well on room air. She denies any SOB, abdominal pain, cough. Her diarrhea has improved.     PAST MEDICAL & SURGICAL HISTORY  PVD (peripheral vascular disease)  Chronic kidney disease (CKD)  CAD (coronary artery disease)  S/P cholecystectomy    SOCIAL HISTORY:  Negative for smoking/alcohol/drug use.     ALLERGIES:  No Known Allergies    MEDICATIONS:  STANDING MEDICATIONS  acetaminophen   Tablet .. 650 milliGRAM(s) Oral every 6 hours PRN Temp greater or equal to 38.5C (101.3F)  amLODIPine   Tablet 5 milliGRAM(s) Oral daily  atorvastatin 20 milliGRAM(s) Oral at bedtime  carvedilol 25 milliGRAM(s) Oral every 12 hours  cilostazol 100 milliGRAM(s) Oral two times a day  heparin  Injectable 5000 Unit(s) SubCutaneous two times a day  memantine 10 milliGRAM(s) Oral two times a day  pantoprazole    Tablet 40 milliGRAM(s) Oral before breakfast  sodium chloride 0.9%. 1000 milliLiter(s) (100 mL/Hr) IV Continuous <Continuous>    PRN MEDICATIONS  acetaminophen   Tablet .. 650 milliGRAM(s) Oral every 6 hours PRN    VITALS:   T(F): 98.9  HR: 76 (68 - 76)  BP: 145/62 (107/53 - 162/62)  RR: 18 (16 - 18)  SpO2: 94% (94% - 96%)    LABS:                        9.6    4.54  )-----------( 213      ( 27 Mar 2020 05:04 )             27.3     03-27    142  |  113<H>  |  54<H>  ----------------------------<  99  4.0   |  19  |  2.3<H>    Ca    8.3<L>      27 Mar 2020 05:04  Phos  3.8     03-26  Mg     1.9     03-27    TPro  5.8<L>  /  Alb  2.9<L>  /  TBili  0.3  /  DBili  x   /  AST  37  /  ALT  20  /  AlkPhos  61  03-27    PT/INR - ( 25 Mar 2020 12:18 )   PT: 11.80 sec;   INR: 1.03 ratio         PTT - ( 25 Mar 2020 12:18 )  PTT:31.9 sec          Culture - Blood (collected 25 Mar 2020 12:06)  Source: .Blood Blood-Peripheral  Preliminary Report (26 Mar 2020 22:01):    No growth to date.    Culture - Blood (collected 25 Mar 2020 12:06)  Source: .Blood Blood-Peripheral  Preliminary Report (26 Mar 2020 22:01):    No growth to date.          Serum Pro-Brain Natriuretic Peptide: 1004 pg/mL (03-27-20 @ 05:04)  Serum Pro-Brain Natriuretic Peptide: 1557 pg/mL (03-26-20 @ 11:28)  Serum Pro-Brain Natriuretic Peptide: 6 pg/mL (03-25-20 @ 12:18)

## 2020-03-27 NOTE — DIETITIAN INITIAL EVALUATION ADULT. - NUTRITIONGOAL OUTCOME1
In 4 days pt. to consume at least 75% PO and supplement In 4 days pt. to consume at least 50-75% PO and supplement

## 2020-03-27 NOTE — CONSULT NOTE ADULT - SUBJECTIVE AND OBJECTIVE BOX
ESSIE ARMENDARIZ  89y, Female  Allergy: No Known Allergies      CHIEF COMPLAINT: MARILU on CKD (27 Mar 2020 09:29)      LOS  2d    HPI:  89 yo F with PMH of CAD s/p PCI, PVD, HTN, CKD4 presented to ED complaining of generalized weakness and dry cough of 2 days duration. Collateral history obtained from daughter over phone. Reports difficulty ambulating and getting out of bed. All symptoms started 2 days ago. Family also reports low BP at home and multiple episodes of diarrhea. Deny any SOB, chills, myalgias N/V/C, chest pain, dysuria, changes in taste or smell, or other complaint. Deny fevers but also state they did not check temperature (patient did not feel febrile). Report nearly normal PO and fluid intake.  Has HHA 7 hours daily. No known exposure to COVID, no recent travel. (25 Mar 2020 13:52)      INFECTIOUS DISEASE HISTORY:  bcx with gpr  COVID-19 +     PAST MEDICAL & SURGICAL HISTORY:  PVD (peripheral vascular disease)  Chronic kidney disease (CKD)  CAD (coronary artery disease)  S/P cholecystectomy      FAMILY HISTORY      SOCIAL HISTORY  Social History:  Denies alcohol, tobacco, or illicit drug use (25 Mar 2020 13:52)        ROS  General: Denies rigors, nightsweats  HEENT: Denies headache, rhinorrhea, sore throat, eye pain  CV: Denies CP, palpitations  PULM: Denies wheezing, hemoptysis  GI: Denies hematemesis, hematochezia, melena  : Denies discharge, hematuria  MSK: Denies arthralgias, myalgias  SKIN: Denies rash, lesions  NEURO: Denies paresthesias, weakness  PSYCH: Denies depression, anxiety    VITALS:  T(F): 98.7, Max: 99 (03-27-20 @ 06:06)  HR: 117  BP: 149/70  RR: 18Vital Signs Last 24 Hrs  T(C): 37.1 (27 Mar 2020 13:40), Max: 37.2 (27 Mar 2020 06:06)  T(F): 98.7 (27 Mar 2020 13:40), Max: 99 (27 Mar 2020 06:06)  HR: 117 (27 Mar 2020 14:24) (72 - 117)  BP: 149/70 (27 Mar 2020 13:40) (116/59 - 162/62)  BP(mean): --  RR: 18 (27 Mar 2020 13:40) (16 - 18)  SpO2: 96% (27 Mar 2020 09:00) (94% - 96%)    PHYSICAL EXAM:  Gen: Elderly F on NC  HEENT: NCAT  Resp: b/l chest expansion  Neuro: nonfocal     TESTS & MEASUREMENTS:                        9.6    4.54  )-----------( 213      ( 27 Mar 2020 05:04 )             27.3     03-27    142  |  113<H>  |  54<H>  ----------------------------<  99  4.0   |  19  |  2.3<H>    Ca    8.3<L>      27 Mar 2020 05:04  Phos  3.8     03-26  Mg     1.9     03-27    TPro  5.8<L>  /  Alb  2.9<L>  /  TBili  0.3  /  DBili  x   /  AST  37  /  ALT  20  /  AlkPhos  61  03-27    eGFR if Non African American: 18 mL/min/1.73M2 (03-27-20 @ 05:04)  eGFR if African American: 21 mL/min/1.73M2 (03-27-20 @ 05:04)    LIVER FUNCTIONS - ( 27 Mar 2020 05:04 )  Alb: 2.9 g/dL / Pro: 5.8 g/dL / ALK PHOS: 61 U/L / ALT: 20 U/L / AST: 37 U/L / GGT: x               Culture - Blood (collected 03-25-20 @ 12:06)  Source: .Blood Blood-Peripheral  Preliminary Report (03-26-20 @ 22:01):    No growth to date.    Culture - Blood (collected 03-25-20 @ 12:06)  Source: .Blood Blood-Peripheral  Gram Stain (03-27-20 @ 11:58):    Growth in aerobic bottle:    Gram Positive Rods  Preliminary Report (03-27-20 @ 11:59):    Growth in aerobic bottle:    Gram Positive Rods    Culture - Urine (collected 05-17-19 @ 08:05)  Source: .Urine Catheterized  Final Report (05-19-19 @ 17:16):    >100,000 CFU/ml Pseudomonas aeruginosa  Organism: Pseudomonas aeruginosa (05-19-19 @ 17:16)  Organism: Pseudomonas aeruginosa (05-19-19 @ 17:16)      -  Amikacin: S <=16      -  Aztreonam: S <=4      -  Cefepime: S <=4      -  Ceftazidime: S 4      -  Ciprofloxacin: S <=1      -  Gentamicin: S <=4      -  Imipenem: S 2      -  Levofloxacin: S <=2      -  Meropenem: S <=1      -  Piperacillin/Tazobactam: S <=16      -  Tobramycin: S <=4      Method Type: REBECA    Culture - Blood (collected 05-15-19 @ 08:11)  Source: .Blood None  Final Report (05-20-19 @ 18:01):    No growth at 5 days.    Culture - Blood (collected 05-14-19 @ 19:54)  Source: .Blood None  Final Report (05-20-19 @ 03:01):    No growth at 5 days.    Culture - Blood (collected 05-14-19 @ 19:54)  Source: .Blood None  Final Report (05-20-19 @ 03:01):    No growth at 5 days.        Blood Gas Venous - Lactate: 0.9 mmoL/L (03-25-20 @ 12:25)      INFECTIOUS DISEASES TESTING  Procalcitonin, Serum: 0.11 ng/mL (03-26-20 @ 05:40)  COVID-19 PCR: Detected (03-25-20 @ 12:06)      RADIOLOGY & ADDITIONAL TESTS:  I have personally reviewed the last Chest xray  CXR  Xray Chest 1 View- PORTABLE-Urgent:   EXAM:  XR CHEST PORTABLE URGENT 1V            PROCEDURE DATE:  03/26/2020            INTERPRETATION:  Clinical History / Reason for exam: Shortness of breath    Comparison : Chest radiograph March 25, 2020.    Technique/Positioning: Single AP view of the chest.    Findings:    Support devices: None.    Cardiac/mediastinum/hilum: Unchanged.    Lung parenchyma/Pleura: Increasing right-sided interstitial/airspace opacities. No pneumothorax.    Skeleton/soft tissues: Unchanged.    Impression:      Increasing right-sided interstitial/airspace opacities.                      ELLIOT LANDAU M.D., ATTENDING RADIOLOGIST  This document has been electronically signed. Mar 26 2020  9:37AM             (03-26-20 @ 10:14)      CT      CARDIOLOGY TESTING  12 Lead ECG:   Ventricular Rate 82 BPM    Atrial Rate 82 BPM    P-R Interval 142 ms    QRS Duration 74 ms    Q-T Interval 404 ms    QTC Calculation(Bezet) 472 ms    P Axis 64 degrees    R Axis 54 degrees    T Axis 71 degrees    Diagnosis Line Sinus rhythm with Premature atrial complexes with Aberrant conduction  Otherwise normal ECG    Confirmed by Christo Brenner ([id number]) on 3/25/2020 2:28:27 PM (03-25-20 @ 13:19)      MEDICATIONS  amLODIPine   Tablet 5  atorvastatin 20  carvedilol 25  cilostazol 100  heparin  Injectable 5000  memantine 10  pantoprazole    Tablet 40  sodium chloride 0.9%. 1000      Weight  Weight (kg): 55.9 (03-25-20 @ 16:20)    ANTIBIOTICS:      ALLERGIES:  No Known Allergies

## 2020-03-27 NOTE — PROGRESS NOTE ADULT - ASSESSMENT
89 yo F with PMH of CAD s/p PCI, PVD, HTN, CKD4 presented to ED complaining of generalized weakness and cough of 2 days duration.    #) Weakness + cough + fever 2/2 suspected COVID-19  - Flu + RVP -ve  - CXR on admission: mild increase pulmonary vascular markings + mild bibasilar atelectasis  - CXR 3/26 increased R sided opacities ; appears improved 3/27  - COVID swab sent - positive  - Airborne + contact precautions until above returns  - saturating well on RA  - CRP 2.09  + procalcitonin 0.11  - blood cultures negative   - BNP 1557 ; 1004  - no hydroxychloroquine for now  - ID consult pending     #) MARILU on CKD4; improving w/ fluids   - likely pre-renal 2/2 decreased PO intake and diarrhea  - s/p 2L in ED  - will check urine lytes  - c/w IVF NS @ 100cc/hr  - CR improved today - continue to monitor daily     #) CAD s/p PCI   - c/w BB + statin, not on ASA    #) PVD   - c/w Cilostazol    #) HTN   - BP stable in ED, will hold ACE + HCTZ for now  - c/w Norvasc + BB    DVT ppx: Heparin subQ  Diet: DASH/renal  Activity: OOBTC  Code status: DNR/DNI   Dispo: pending - from home; possible DC tomorrow    PLEASE INFORM DAUGHTER DI OF ANY UPDATES/CHANGES IN CLINICAL STATUS - PHONE #[phone] 89 yo F with PMH of CAD s/p PCI, PVD, HTN, CKD4 presented to ED complaining of generalized weakness and cough of 2 days duration.    #) Weakness + cough + fever 2/2 suspected COVID-19  - Flu + RVP -ve  - CXR on admission: mild increase pulmonary vascular markings + mild bibasilar atelectasis  - CXR 3/26 increased R sided opacities ; appears improved 3/27  - COVID swab sent - positive  - Airborne + contact precautions until above returns  - saturating well on RA  - CRP 2.09  + procalcitonin 0.11  - blood cultures - prelim gram positive rods -  FU ID   - BNP 1557 ; 1004  - no hydroxychloroquine for now  - ID consult pending     #) MARILU on CKD4; improving w/ fluids   - likely pre-renal 2/2 decreased PO intake and diarrhea  - s/p 2L in ED  - will check urine lytes  - c/w IVF NS @ 100cc/hr  - CR improved today - continue to monitor daily     #) CAD s/p PCI   - c/w BB + statin, not on ASA    #) PVD   - c/w Cilostazol    #) HTN   - BP stable in ED, will hold ACE + HCTZ for now  - c/w Norvasc + BB    DVT ppx: Heparin subQ  Diet: DASH/renal  Activity: OOBTC  Code status: DNR/DNI   Dispo: pending - from home; possible DC tomorrow    PLEASE INFORM DAUGHTER DI OF ANY UPDATES/CHANGES IN CLINICAL STATUS - PHONE #[phone]

## 2020-03-28 NOTE — DISCHARGE NOTE PROVIDER - NSDCFUADDAPPT_GEN_ALL_CORE_FT
Please follow the instructions from the provided handouts. Please monitor your symptoms and continue to quarantine as instructed. Follow up with primary care doctor and discuss your hospitalization.

## 2020-03-28 NOTE — DISCHARGE NOTE PROVIDER - HOSPITAL COURSE
89 yo F with PMH of CAD s/p PCI, PVD, HTN, CKD4 presented to ED complaining of generalized weakness and dry cough of 2 days duration. Collateral history obtained from daughter over phone. Reports difficulty ambulating and getting out of bed. All symptoms started 2 days ago. Family also reports low BP at home and multiple episodes of diarrhea. Deny any SOB, chills, myalgias N/V/C, chest pain, dysuria, changes in taste or smell, or other complaint. Deny fevers but also state they did not check temperature (patient did not feel febrile). Report nearly normal PO and fluid intake.  Has HHA 7 hours daily. No known exposure to COVID, no recent travel.        Patient tested positive for COVID19. She was never started on hydroxychloroquine, as her O2 sat remained % on room air and her CXR remained unchanged.         She was also found to have MARILU on admission likely due to dehydration. This improved with fluid resusciatation as well as with cessation of diarrhea episodes.         She remained hemodynamically stable during admission and will be discharged home with home health aide.

## 2020-03-28 NOTE — PROGRESS NOTE ADULT - SUBJECTIVE AND OBJECTIVE BOX
SUBJECTIVE:    Patient is a 89y old Female who presents with a chief complaint of MARILU on CKD (27 Mar 2020 16:07)    Currently admitted to medicine with the primary diagnosis of Dehydration     Today is hospital day 3d. Overnight, patient was febrile to 100.4; VSS. She has decreased appetite today. Still maintained O2 saturation on room air.     PAST MEDICAL & SURGICAL HISTORY  PVD (peripheral vascular disease)  Chronic kidney disease (CKD)  CAD (coronary artery disease)  S/P cholecystectomy    SOCIAL HISTORY:  Negative for smoking/alcohol/drug use.     ALLERGIES:  No Known Allergies    MEDICATIONS:  STANDING MEDICATIONS  acetaminophen   Tablet .. 650 milliGRAM(s) Oral every 6 hours PRN Temp greater or equal to 38.5C (101.3F)  amLODIPine   Tablet 5 milliGRAM(s) Oral daily  atorvastatin 20 milliGRAM(s) Oral at bedtime  carvedilol 25 milliGRAM(s) Oral every 12 hours  cilostazol 100 milliGRAM(s) Oral two times a day  heparin  Injectable 5000 Unit(s) SubCutaneous two times a day  magnesium sulfate  IVPB 2 Gram(s) IV Intermittent once  memantine 10 milliGRAM(s) Oral two times a day  pantoprazole    Tablet 40 milliGRAM(s) Oral before breakfast  sodium chloride 0.9%. 1000 milliLiter(s) (75 mL/Hr) IV Continuous <Continuous>    PRN MEDICATIONS  acetaminophen   Tablet .. 650 milliGRAM(s) Oral every 6 hours PRN    VITALS:   T(F): 99.3  HR: 86 (77 - 117)  BP: 161/70 (149/70 - 161/70)  RR: 18 (18 - 18)  SpO2: --    LABS:                        10.5   6.07  )-----------( 234      ( 28 Mar 2020 06:05 )             29.4     03-28    146  |  112<H>  |  44<H>  ----------------------------<  109<H>  3.8   |  21  |  1.8<H>    Ca    8.5      28 Mar 2020 06:05  Mg     1.7     03-28    TPro  6.6  /  Alb  3.4<L>  /  TBili  0.3  /  DBili  x   /  AST  38  /  ALT  23  /  AlkPhos  67  03-28              Culture - Blood (collected 25 Mar 2020 12:06)  Source: .Blood Blood-Peripheral  Preliminary Report (26 Mar 2020 22:01):    No growth to date.    Culture - Blood (collected 25 Mar 2020 12:06)  Source: .Blood Blood-Peripheral  Gram Stain (27 Mar 2020 11:58):    Growth in aerobic bottle:    Gram Positive Rods  Preliminary Report (27 Mar 2020 11:59):    Growth in aerobic bottle:    Gram Positive Rods      Serum Pro-Brain Natriuretic Peptide: 1004 pg/mL (03-27-20 @ 05:04)  Serum Pro-Brain Natriuretic Peptide: 1557 pg/mL (03-26-20 @ 11:28)  Serum Pro-Brain Natriuretic Peptide: 6 pg/mL (03-25-20 @ 12:18)

## 2020-03-28 NOTE — DISCHARGE NOTE PROVIDER - NSDCCPCAREPLAN_GEN_ALL_CORE_FT
PRINCIPAL DISCHARGE DIAGNOSIS  Diagnosis: Infection due to 2019 novel coronavirus  Assessment and Plan of Treatment: Coronavirus disease 2019 (COVID-19) is a respiratory illness  that can spread from person to person. The virus that causes  COVID-19 is a novel coronavirus that was first identified during  an investigation into an outbreak in [place], [place].  The virus that causes COVID-19 probably emerged from an  animal source, but is now spreading from person to person.  The virus is thought to spread mainly between people who  are in close contact with one another (within about 6 feet)  through respiratory droplets produced when an infected  person coughs or sneezes. It also may be possible that a person  can get COVID-19 by touching a surface or object that has  the virus on it and then touching their own mouth, nose, or  possibly their eyes, but this is not thought to be the main  way the virus spreads.  Patients with COVID-19 have had mild to severe respiratory  illness with symptoms of  • fever  • cough  • shortness of breath  People can help protect themselves from respiratory illness with  everyday preventive actions.    • Avoid close contact with people who are sick.  • Avoid touching your eyes, nose, and mouth with  unwashed hands.  • Wash your hands often with soap and water for at least 20   seconds. Use an alcohol-based hand  that contains at  least 60% alcohol if soap and water are not available.   Stay home when you are sick.  • Cover your cough or sneeze with a tissue, then throw the  tissue in the trash.  • Clean and disinfect frequently touched objects  and surfaces.  For fever, please use tylenol only. Refrain from NSAIDs at this time.   Call 911 and inform them you are covid positive before you decide to go to the emergency room if you have chest pain, difficulty breathing, high fevers, worsening of your symptoms, feel unwell or have nausea and vomiting.        SECONDARY DISCHARGE DIAGNOSES  Diagnosis: MARILU (acute kidney injury)  Assessment and Plan of Treatment: You were found to have decreased kidney function during admission from dehydration and diarrhea. This improved with intravenous fluids and cessation of your diarrhea. It is important to continue to increased your oral intake of fluid and hydration at home to prevent recurrence of kidney function and dehydration. PRINCIPAL DISCHARGE DIAGNOSIS  Diagnosis: Infection due to 2019 novel coronavirus  Assessment and Plan of Treatment: Coronavirus disease 2019 (COVID-19) is a respiratory illness  that can spread from person to person. The virus that causes  COVID-19 is a novel coronavirus that was first identified during  an investigation into an outbreak in [place], [place].  The virus that causes COVID-19 probably emerged from an  animal source, but is now spreading from person to person.  The virus is thought to spread mainly between people who  are in close contact with one another (within about 6 feet)  through respiratory droplets produced when an infected  person coughs or sneezes. It also may be possible that a person  can get COVID-19 by touching a surface or object that has  the virus on it and then touching their own mouth, nose, or  possibly their eyes, but this is not thought to be the main  way the virus spreads.  Patients with COVID-19 have had mild to severe respiratory  illness with symptoms of  • fever  • cough  • shortness of breath  People can help protect themselves from respiratory illness with  everyday preventive actions.    • Avoid close contact with people who are sick.  • Avoid touching your eyes, nose, and mouth with  unwashed hands.  • Wash your hands often with soap and water for at least 20   seconds. Use an alcohol-based hand  that contains at  least 60% alcohol if soap and water are not available.   Stay home when you are sick.  • Cover your cough or sneeze with a tissue, then throw the  tissue in the trash.  • Clean and disinfect frequently touched objects  and surfaces.  For fever, please use tylenol only. Refrain from NSAIDs at this time.   Call 911 and inform them you are covid positive before you decide to go to the emergency room if you have chest pain, difficulty breathing, high fevers, worsening of your symptoms, feel unwell or have nausea and vomiting.        SECONDARY DISCHARGE DIAGNOSES  Diagnosis: MARILU (acute kidney injury)  Assessment and Plan of Treatment: You were found to have decreased kidney function during admission from dehydration and diarrhea. This improved with intravenous fluids and cessation of your diarrhea. It is important to continue to increased your oral intake of fluid and hydration at home to prevent recurrence of kidney function and dehydration. your meds HCTZ and enalapril have been discontinued for now to prevent worsening of MARILU. fu PMD for repeat bmp to trend renal function before restarting these 2 meds.

## 2020-03-28 NOTE — PROGRESS NOTE ADULT - SUBJECTIVE AND OBJECTIVE BOX
Progress Note:  Provider Speciality                            Hospitalist      ESSIE ARMENDARIZ MRN-[id number] 89y Female     CHIEF PRESENTING COMPLAINT:  Patient is a 89y old  Female who presents with a chief complaint of MARILU on CKD (28 Mar 2020 09:50)        SUBJECTIVE:  Patient was seen and examined at bedside.   Reports doing ok/ denies sob/ cough/ reports no further diarrhea/ confirmed with PCA- no diarrhea today.   No significant overnight events reported.     HISTORY OF PRESENTING ILLNESS:  HPI:  89 yo F with PMH of CAD s/p PCI, PVD, HTN, CKD4 presented to ED complaining of generalized weakness and dry cough of 2 days duration. Collateral history obtained from daughter over phone. Reports difficulty ambulating and getting out of bed. All symptoms started 2 days ago. Family also reports low BP at home and multiple episodes of diarrhea. Deny any SOB, chills, myalgias N/V/C, chest pain, dysuria, changes in taste or smell, or other complaint. Deny fevers but also state they did not check temperature (patient did not feel febrile). Report nearly normal PO and fluid intake.  Has HHA 7 hours daily. No known exposure to COVID, no recent travel. (25 Mar 2020 13:52)        REVIEW OF SYSTEMS:    At least 10 systems were reviewed in ROS. All systems reviewed  are within normal limits except for the complaints as described in Subjective.    PAST MEDICAL & SURGICAL HISTORY:  PAST MEDICAL & SURGICAL HISTORY:  PVD (peripheral vascular disease)  Chronic kidney disease (CKD)  CAD (coronary artery disease)  S/P cholecystectomy          VITAL SIGNS:  Vital Signs Last 24 Hrs  T(C): 37.4 (28 Mar 2020 06:30), Max: 38 (27 Mar 2020 19:50)  T(F): 99.3 (28 Mar 2020 06:30), Max: 100.4 (27 Mar 2020 19:50)  HR: 86 (28 Mar 2020 06:30) (77 - 117)  BP: 161/70 (28 Mar 2020 06:30) (149/70 - 161/70)  BP(mean): --  RR: 18 (28 Mar 2020 06:30) (18 - 18)  SpO2: 100% (28 Mar 2020 11:22) (100% - 100%)          PHYSICAL EXAMINATION:    General: AAO, Not in acute distress  HEENT:   EOMI, atraumatic  Heart: S1+S2 audible, no murmur  Lungs: bilateral  fair air entry, no wheezing, no crepitations.  Abdomen: Soft, non-tender, non-distended   CNS: AAO  , no focal deficits.  Extremities:  No edema            CONSULTS:  Consultant(s) Notes Reviewed by me.   Care Discussed with Consultants/Other Providers where required.        MEDICATIONS:  MEDICATIONS  (STANDING):  amLODIPine   Tablet 5 milliGRAM(s) Oral daily  atorvastatin 20 milliGRAM(s) Oral at bedtime  carvedilol 25 milliGRAM(s) Oral every 12 hours  cilostazol 100 milliGRAM(s) Oral two times a day  heparin  Injectable 5000 Unit(s) SubCutaneous two times a day  memantine 10 milliGRAM(s) Oral two times a day  pantoprazole    Tablet 40 milliGRAM(s) Oral before breakfast  sodium chloride 0.9%. 1000 milliLiter(s) (75 mL/Hr) IV Continuous <Continuous>    MEDICATIONS  (PRN):  acetaminophen   Tablet .. 650 milliGRAM(s) Oral every 6 hours PRN Temp greater or equal to 38.5C (101.3F)      [facility]:                        10.5   6.07  )-----------( 234      ( 28 Mar 2020 06:05 )             29.4     03-28    146  |  112<H>  |  44<H>  ----------------------------<  109<H>  3.8   |  21  |  1.8<H>    Ca    8.5      28 Mar 2020 06:05  Mg     1.7     03-28    TPro  6.6  /  Alb  3.4<L>  /  TBili  0.3  /  DBili  x   /  AST  38  /  ALT  23  /  AlkPhos  67  03-28        CAPILLARY BLOOD GLUCOSE                    03-27-20 @ 07:01  -  03-28-20 @ 07:00  --------------------------------------------------------  IN: 2420 mL / OUT: 500 mL / NET: 1920 mL              ASSESSMENT:  A 89 years old male presented to the ED with generalized weakness and dry cough for the last two days. As per family pt had multiple episodes of diarrhea and low BP at home. No fever. No sob.   Pt states that she had two loose BM today.         1. MARILU likely prerenal / CKD-4  2. Viral PNA due to COVID-19  3. PVD  4. CAD  5. HTN  6. Diarrhea due to COVID-19  7. Gram negative bacteremia likely contamination  8. No sepsis on admission    PLAN:    ·	denies respiratory symptoms.   ·	cxr shows stable R sided opacities.   ·	SPO2 100% on RA  ·	off hydroxychloroquine as disease is mild.    ·	BCX 1 bottle from 3/25 growing GPC/ ID evalauted- repeat BCX from 3/27 pending.   ·	Decrease IVF to 50cc/hr if not getting discharged today.   ·	Renal function improving. Monitor BMP   ·	Cont home meds for chronic medical conditions.   ·	no further diarrhea today/    resident discussed with patient's daughter in detail today about possible discharge although patient seemed reluctant going today considering rainy weather outside.       Progress note handoff:   pending: further improvement of MARILU on IVF hydration with poor oral intake/ repeat BCX from 3/27  disposition: home   discussion: resident discussed plan of care in detail with patient daughter over the phone

## 2020-03-28 NOTE — DISCHARGE NOTE PROVIDER - NSDCMRMEDTOKEN_GEN_ALL_CORE_FT
amLODIPine 5 mg oral tablet: 1 tab(s) orally once a day  carvedilol 25 mg oral tablet: 1 tab(s) orally 2 times a day  cilostazol 100 mg oral tablet: 1 tab(s) orally 2 times a day  enalapril 10 mg oral tablet: 1 tab(s) orally 2 times a day  gabapentin 100 mg oral tablet: 1 tab(s) orally once a day  hydroCHLOROthiazide 25 mg oral tablet: 1 tab(s) orally once a day  Lipitor 20 mg oral tablet: 1 tab(s) orally once a day  memantine 10 mg oral tablet: 1 tab(s) orally 2 times a day  pantoprazole 40 mg oral delayed release tablet: 1 tab(s) orally once a day (before a meal) acetaminophen 325 mg oral tablet: 2 tab(s) orally every 6 hours, As needed, Temp greater or equal to 38.5C (101.3F)  amLODIPine 5 mg oral tablet: 1 tab(s) orally once a day  carvedilol 25 mg oral tablet: 1 tab(s) orally 2 times a day  cilostazol 100 mg oral tablet: 1 tab(s) orally 2 times a day  gabapentin 100 mg oral tablet: 1 tab(s) orally once a day  Lipitor 20 mg oral tablet: 1 tab(s) orally once a day  memantine 10 mg oral tablet: 1 tab(s) orally 2 times a day  pantoprazole 40 mg oral delayed release tablet: 1 tab(s) orally once a day (before a meal)

## 2020-03-28 NOTE — PROGRESS NOTE ADULT - ASSESSMENT
89 yo F with PMH of CAD s/p PCI, PVD, HTN, CKD4 presented to ED complaining of generalized weakness and cough of 2 days duration.    #) Weakness + cough + fever 2/2 suspected COVID-19  - Flu + RVP -ve  - CXR on admission: mild increase pulmonary vascular markings + mild bibasilar atelectasis  - CXR 3/26 increased R sided opacities ; appears improved 3/27  - COVID swab sent - positive  - Airborne + contact precautions   - saturating well on RA  - CRP 2.09  + procalcitonin 0.11; repeat to be sent 3/29 AM  - blood cultures - prelim gram positive rods - likely a contaminant; repeat blood cultures sent 3/27 and 3/28  - BNP 1557 ; 1004  - no hydroxychloroquine for now  - ID on board     #) MARILU on CKD4; continues to improve w/ fluids   - likely pre-renal 2/2 decreased PO intake and diarrhea  - s/p 2L in ED  - c/w IVF NS @ 75cc/hr  - CR continue to improve today - continue to monitor daily     #) CAD s/p PCI   - c/w BB + statin, not on ASA    #) PVD   - c/w Cilostazol    #) HTN   - BP stable in ED, will hold ACE + HCTZ for now  - c/w Norvasc + BB    DVT ppx: Heparin subQ  Diet: DASH/renal  Activity: OOBTC  Code status: DNR/DNI   Dispo: pending - from home; possible DC tomorrow - pending increase in oral intake.     Daughter, Bridgette, was informed [phone] this morning.

## 2020-03-28 NOTE — DISCHARGE NOTE PROVIDER - NSDCFUADDINST_GEN_ALL_CORE_FT
If your symptoms return or persist, please call your primary care provider and the emergency department and discuss your symptoms to determine next level of care during this pandemic

## 2020-03-28 NOTE — DISCHARGE NOTE PROVIDER - CARE PROVIDER_API CALL
Luis Eduardo Moore)  Internal Medicine  [address] [place] [address]  Phone: [phone]  Fax: [phone]  Follow Up Time: 1-3 days

## 2020-03-29 NOTE — DISCHARGE NOTE NURSING/CASE MANAGEMENT/SOCIAL WORK - PATIENT PORTAL LINK FT
You can access the FollowMyHealth Patient Portal offered by [facility] by registering at the following website: [email or web address]. By joining [employer]’s FollowMyHealth portal, you will also be able to view your health information using other applications (apps) compatible with our system.

## 2020-03-29 NOTE — PROGRESS NOTE ADULT - SUBJECTIVE AND OBJECTIVE BOX
Progress Note:  Provider Speciality                            Hospitalist      ESSIE ARMENDARIZ [id number] 89y Female     CHIEF PRESENTING COMPLAINT:  Patient is a 89y old  Female who presents with a chief complaint of MARILU on CKD (28 Mar 2020 09:50)        SUBJECTIVE:  Patient was seen and examined at bedside.   Reports doing ok/ denies sob/ cough/ no further diarrhea reportedly. patient wants to go home.   No significant overnight events reported.     HISTORY OF PRESENTING ILLNESS:  HPI:  89 yo F with PMH of CAD s/p PCI, PVD, HTN, CKD4 presented to ED complaining of generalized weakness and dry cough of 2 days duration. Collateral history obtained from daughter over phone. Reports difficulty ambulating and getting out of bed. All symptoms started 2 days ago. Family also reports low BP at home and multiple episodes of diarrhea. Deny any SOB, chills, myalgias N/V/C, chest pain, dysuria, changes in taste or smell, or other complaint. Deny fevers but also state they did not check temperature (patient did not feel febrile). Report nearly normal PO and fluid intake.  Has HHA 7 hours daily. No known exposure to COVID, no recent travel. (25 Mar 2020 13:52)        REVIEW OF SYSTEMS:    At least 10 systems were reviewed in ROS. All systems reviewed  are within normal limits except for the complaints as described in Subjective.    PAST MEDICAL & SURGICAL HISTORY:  PAST MEDICAL & SURGICAL HISTORY:  PVD (peripheral vascular disease)  Chronic kidney disease (CKD)  CAD (coronary artery disease)  S/P cholecystectomy          VITAL SIGNS:  Vital Signs Last 24 Hrs  T(C): 38 (29 Mar 2020 13:55), Max: 38.2 (29 Mar 2020 06:57)  T(F): 100.4 (29 Mar 2020 13:55), Max: 100.8 (29 Mar 2020 06:57)  HR: 82 (29 Mar 2020 13:55) (82 - 87)  BP: 130/60 (29 Mar 2020 13:55) (130/60 - 182/87)  BP(mean): --  RR: 20 (29 Mar 2020 13:55) (18 - 20)  SpO2: 97% (29 Mar 2020 08:20) (97% - 100%)          PHYSICAL EXAMINATION:    General: AAO, Not in acute distress  HEENT:   EOMI, atraumatic  Heart: S1+S2 audible, no murmur  Lungs: bilateral  fair air entry, no wheezing, no crepitations.  Abdomen: Soft, non-tender, non-distended   CNS: AAO  , no focal deficits.  Extremities:  No edema            CONSULTS:  Consultant(s) Notes Reviewed by me.   Care Discussed with Consultants/Other Providers where required.        MEDICATIONS:  MEDICATIONS  (STANDING):  amLODIPine   Tablet 5 milliGRAM(s) Oral daily  atorvastatin 20 milliGRAM(s) Oral at bedtime  carvedilol 25 milliGRAM(s) Oral every 12 hours  cilostazol 100 milliGRAM(s) Oral two times a day  heparin  Injectable 5000 Unit(s) SubCutaneous two times a day  memantine 10 milliGRAM(s) Oral two times a day  pantoprazole    Tablet 40 milliGRAM(s) Oral before breakfast  sodium chloride 0.9%. 1000 milliLiter(s) (75 mL/Hr) IV Continuous <Continuous>    MEDICATIONS  (PRN):  acetaminophen   Tablet .. 650 milliGRAM(s) Oral every 6 hours PRN Temp greater or equal to 38.5C (101.3F)      [facility]:                        10.5   6.07  )-----------( 234      ( 28 Mar 2020 06:05 )             29.4     03-28    146  |  112<H>  |  44<H>  ----------------------------<  109<H>  3.8   |  21  |  1.8<H>    Ca    8.5      28 Mar 2020 06:05  Mg     1.7     03-28    TPro  6.6  /  Alb  3.4<L>  /  TBili  0.3  /  DBili  x   /  AST  38  /  ALT  23  /  AlkPhos  67  03-28        CAPILLARY BLOOD GLUCOSE                    03-27-20 @ 07:01  -  03-28-20 @ 07:00  --------------------------------------------------------  IN: 2420 mL / OUT: 500 mL / NET: 1920 mL              ASSESSMENT:  A 89 years old male presented to the ED with generalized weakness and dry cough for the last two days. As per family pt had multiple episodes of diarrhea and low BP at home. No fever. No sob.   Pt states that she had two loose BM today.         1. MARILU likely prerenal / CKD-4  2. Viral PNA due to COVID-19  3. PVD  4. CAD  5. HTN  6. Diarrhea due to COVID-19  7. Gram negative bacteremia likely contamination  8. No sepsis on admission  9.deconditioning.    PLAN:    respiratory status is stable with stable SPO2 on RA.    off hydroxychloroquine as disease is mild.    has low grade temp/ can continue to have fever for 2 weeks with Covid infection/monitor for high grade temp  tylenol prn for fever.   BCX 1 bottle from 3/25 growing GPC/ ID evaluated- repeat BCX from 3/27 NGTD.   Renal function improving. advised to keep holding HCTZ/ enalapril until repeat bmp with PMD with trend down of cr.   has poor oral intake/ advised to daughter to encourage oral intake and hydration.   Cont home meds for chronic medical conditions.   no further diarrhea reported.  discussed with daughter on [phone] about patient's gen weakness and if she would like rehab eval but daughter persistently refused to send patient to rehab and wants to take her home and reports she has a wheelchair at home and she is confident she would be able to transfer patient to and from the wheelchair by herself.           Progress note handoff:   pending:  none / home today.  disposition: home   discussion: plan of discharge discussed with daughter over the phone

## 2020-04-01 NOTE — PROVIDER CONTACT NOTE (OTHER) - RECOMMENDATIONS
Intervention needed please
Please consult w/ pt's daughter, Bridgette prior to surgery.
Skin normal color for race, warm, dry and intact. No evidence of rash.

## 2020-04-04 NOTE — H&P ADULT - NSHPREVIEWOFSYSTEMS_GEN_ALL_CORE
REVIEW OF SYSTEMS:    CONSTITUTIONAL: +fatigue and fevers  EYES/ENT: No visual changes;  No vertigo or throat pain   NECK: No pain or stiffness  RESPIRATORY: +cough, no wheezing or hemoptysis; having SOB over last two weeks but denies SOB currently  CARDIOVASCULAR: No chest pain or palpitations  GASTROINTESTINAL: No abdominal or epigastric pain. No nausea, vomiting, or hematemesis; No melena or hematochezia.  GENITOURINARY: No dysuria, frequency or hematuria  NEUROLOGICAL: No numbness or weakness

## 2020-04-04 NOTE — H&P ADULT - ASSESSMENT
89 yo F with PMH of CAD s/p PCI, PVD, HTN, CKD4 presented to ED cough, SOB, fever    #) Weakness + cough + fever secondary COVID-19   - symptoms started 2 weeks ago, pt tested positive on 3/25/2020  - CXR today shows worsening bilateral opacities since prior  - Airborne + contact precautions   - signout from ER: pt had sats in low 70s on room air, was put on NRB mask on 15L on admission sats went up to 98%, oxygen titrated down to 6L on NC, sats now 93%  - Plaquenil 400mg po q12 x2 doses, then 200mg po q12 x 8 doses .  QTc 477 on EKG, QT monitoring not required given baseline QTc  - f/u procal and CRP  - tylenol prn for fever     #) MARILU on CKD4, hypernatremic on bmp  - likely pre-renal secondary decreased PO intake  - IVF overnight NS @ 75cc/hr    #) CAD s/p PCI - c/w BB + statin, not on ASA    #) PVD - c/w Cilostazol    #) HTN -c/w Norvasc + BB (HR 95)    DVT ppx: Heparin subQ  Diet: DASH/renal  Code status: DNR/DNI

## 2020-04-04 NOTE — ED PROVIDER NOTE - ATTENDING CONTRIBUTION TO CARE
89 y/o F pmh as noted, p/w sob fever cough x 2wsk. Seen for similar w/ admission. had + COVID test. + decreased PO. ROS PE as noted. IMP: COVID vs PNA; dehydration vs metabolic. P: labs, cxr, ivf, O2, reassess.

## 2020-04-04 NOTE — ED PROVIDER NOTE - CARE PLAN
Principal Discharge DX:	SOB (shortness of breath)  Secondary Diagnosis:	Hypoxia  Secondary Diagnosis:	Respiratory distress

## 2020-04-04 NOTE — ED PROVIDER NOTE - NS ED ROS FT
Constitutional:  See HPI  Eyes:  No visual changes  ENMT: No neck pain or stiffness  Cardiac:  No chest pain  Respiratory:  See HPI  GI:  No nausea, vomiting, diarrhea or abdominal pain.  :  No dysuria, frequency or burning.  MS:  No back pain.  Neuro:  No headache   Skin:  No skin rash  Except as documented in the HPI,  all other systems are negative

## 2020-04-04 NOTE — H&P ADULT - NSHPLABSRESULTS_GEN_ALL_CORE
Labs:                        11.4   11.93 )-----------( 347      ( 04 Apr 2020 13:14 )             32.6             04-04    150<H>  |  115<H>  |  54<H>  ----------------------------<  124<H>  5.5<H>   |  20  |  2.6<H>    Ca    8.1<L>      04 Apr 2020 13:14    TPro  6.8  /  Alb  2.9<L>  /  TBili  0.9  /  DBili  x   /  AST  125<H>  /  ALT  51<H>  /  AlkPhos  52  04-04    LIVER FUNCTIONS - ( 04 Apr 2020 13:14 )  Alb: 2.9 g/dL / Pro: 6.8 g/dL / ALK PHOS: 52 U/L / ALT: 51 U/L / AST: 125 U/L / GGT: x

## 2020-04-04 NOTE — ED PROVIDER NOTE - OBJECTIVE STATEMENT
Pt is a 89yo Female with hx of CKD  cough , fever , SOB x 1 day ,  Associated with decreased and fatigue/ 89 yo F with PMH of CAD s/p PCI, PVD, HTN, CKD4 presenting with  cough , fever , SOB x ~2 weeks. Of note pt had a recent admission for similar presentation and diagnosed with COVID at this time.  Associated with decreased appetite  and fatigue.

## 2020-04-04 NOTE — ED PROVIDER NOTE - CRITICAL CARE PROVIDED
interpretation of diagnostic studies/documentation/direct patient care (not related to procedure)/additional history taking/conducted a detailed discussion of DNR status

## 2020-04-04 NOTE — ED PROVIDER NOTE - PHYSICAL EXAMINATION
CONSTITUTIONAL: tired, ill appearing  SKIN: Warm dry  HEAD: NCAT  EYES: NL inspection  ENT: Dry MM  NECK: Supple; non tender.  CARD: RRR  RESP: + tachypnea, + accessory MM use  ABD: S/NT no R/G  EXT: no pedal edema  NEURO: moves all extrem  PSYCH: AOx1

## 2020-04-04 NOTE — H&P ADULT - ATTENDING COMMENTS
89 yr old female returned back to ER with c/o worsening SOB and dehydration.  VITAL SIGNS (Last 24 hrs):  T(C): 37.1 (04-05-20 @ 12:27), Max: 37.1 (04-05-20 @ 12:27)  HR: 89 (04-05-20 @ 12:27) (86 - 95)  BP: 138/63 (04-05-20 @ 12:27) (112/60 - 138/63)  RR: 20 (04-05-20 @ 12:27) (17 - 20)  SpO2: 98% (04-05-20 @ 12:27) (87% - 98%)  Wt(kg): --  Daily     Daily     I&O's Summary    04 Apr 2020 07:01  -  05 Apr 2020 07:00  --------------------------------------------------------  IN: 600 mL / OUT: 0 mL / NET: 600 mL                          11.4   11.93 )-----------( 347      ( 04 Apr 2020 13:14 )             32.6   04-04    150<H>  |  115<H>  |  54<H>  ----------------------------<  124<H>  5.5<H>   |  20  |  2.6<H>    Ca    8.1<L>      04 Apr 2020 13:14    TPro  6.8  /  Alb  2.9<L>  /  TBili  0.9  /  DBili  x   /  AST  125<H>  /  ALT  51<H>  /  AlkPhos  52  04-04  ekg-NSR rate 96/min with LVH  o/e  aaox1  chest--b/l air entry  [id number]  abd/gi--soft, bs+  no edema  Assessment and plan:  #COVID on ceftriaxone and chloroquine-- on nasal cannula-- mumbling but looks comfortable  # MARILU-- on iv fluids-- dehydrated  #hypernatremia-- has been getting saline --would like to change to dextrose-- repeat labs pending  #hyperkalemia-- labs pending-- likely due to MARILU  #pvd-- on cilatazol  daughter Kaitlynn-- agreed to DNR and DNI

## 2020-04-04 NOTE — H&P ADULT - NSHPPHYSICALEXAM_GEN_ALL_CORE
PHYSICAL EXAM:  GENERAL: NAD, giving short few word answers, no signs of respiratory distress, AO x 1  HEAD:  Atraumatic, Normocephalic  EYES: EOMI, PERRLA, conjunctiva and sclera clear  NECK: Supple, No JVD  CHEST/LUNG: No wheeze; No crackles; No accessory muscles used  HEART: Regular rate and rhythm; No murmurs;   ABDOMEN: Soft, Nontender, Nondistended; Bowel sounds present; No guarding  EXTREMITIES:  No cyanosis or edema

## 2020-04-04 NOTE — H&P ADULT - HISTORY OF PRESENT ILLNESS
89 yo F with PMH of CAD s/p PCI, PVD, HTN, CKD4     presented to ER with cough, fever and SOB for two weeks.  Tested positive for COVID on 3/25/2020.  Was discharged 3/29/2020 but symptoms worsened at home.  Worsening fatigue at appetitie at home.  Patient AO x 1 (knowns name).  On 6L NC currently, sitting up in bed, not in respiratory distress.  AO x 1, answering some simple yes/no questions.  Denies any current SOB, chest pain, abdominal pain, headache, neck pain, nausea/vomiting, myalgia.

## 2020-04-04 NOTE — ED PROVIDER NOTE - CLINICAL SUMMARY MEDICAL DECISION MAKING FREE TEXT BOX
Pt w/ cough and fever. clinical picture favors worsening COVID19. case discussed w/ health care proxy, MOLST filled out. Pt is DNR/DNI. will maintain pt on O2, admit to medicine for IVF, O2, specialist consult.

## 2020-04-04 NOTE — PATIENT PROFILE ADULT - DISASTER - NSPRESCRALCFREQ_GEN_A_NUR
Health Maintenance Due   Topic Date Due   • DTaP/Tdap/Td Vaccine (1 - Tdap) 05/01/1964   • Colorectal Cancer Screening-Colonoscopy  05/01/1995   • Shingles Vaccine (1 of 2) 05/01/1995   • Lung Cancer Screening  05/01/2000   • Abdominal Aortic Aneurysm (AAA) Screening  05/01/2010   • Medicare Wellness 65+  05/01/2010   • Pneumococcal Vaccine 65+ (1 of 2 - PCV13) 05/01/2010   • Diabetes Eye Exam  11/01/2018   • Diabetes Foot Exam  03/16/2019   • Influenza Vaccine (1) 09/01/2019   • Diabetes A1C  10/12/2019       Patient is due for topics listed above, he wishes to proceed with Diabetes A1C, diabetic eye exam but is not proceeding with Immunization(s) Influenza, tdap, pneumonia, shingles lung cancer screening aaa screening, or colon cancer screening at this time.     Patient will call for eye exam.  Patient goes to [facility].    Patient declines all other health maintenance.        unable to assess PT AMS

## 2020-04-05 NOTE — SWALLOW BEDSIDE ASSESSMENT ADULT - ASR SWALLOW ASPIRATION MONITOR
upper respiratory infection/oral hygiene/throat clearing/fever/pneumonia/change of breathing pattern/position upright (89Y)/cough/gurgly voice

## 2020-04-05 NOTE — PROGRESS NOTE ADULT - SUBJECTIVE AND OBJECTIVE BOX
ESSIE ARMENDARIZ 89y Female  MRN#: [id number]   CODE STATUS:______DNR DNI__      SUBJECTIVE  Patient is a 89y old Female who presents with a chief complaint of Currently admitted to medicine with the primary diagnosis of SOB (shortness of breath)    satting 92% on 6L  no acute events    OBJECTIVE  PAST MEDICAL & SURGICAL HISTORY  PVD (peripheral vascular disease)  Chronic kidney disease (CKD)  CAD (coronary artery disease)  S/P cholecystectomy    ALLERGIES:  No Known Allergies    MEDICATIONS:  STANDING MEDICATIONS  amLODIPine   Tablet 5 milliGRAM(s) Oral daily  atorvastatin 20 milliGRAM(s) Oral at bedtime  carvedilol 25 milliGRAM(s) Oral every 12 hours  cilostazol 100 milliGRAM(s) Oral two times a day  gabapentin 100 milliGRAM(s) Oral daily  heparin  Injectable 5000 Unit(s) SubCutaneous every 12 hours  hydroxychloroquine 400 milliGRAM(s) Oral every 12 hours  hydroxychloroquine 200 milliGRAM(s) Oral every 12 hours  hydroxychloroquine   Oral   memantine 10 milliGRAM(s) Oral two times a day  pantoprazole    Tablet 40 milliGRAM(s) Oral before breakfast  sodium chloride 0.9%. 1000 milliLiter(s) IV Continuous <Continuous>    PRN MEDICATIONS  acetaminophen   Tablet .. 650 milliGRAM(s) Oral every 6 hours PRN      VITAL SIGNS: Last 24 Hours  T(C): 36.3 (05 Apr 2020 06:01), Max: 39.4 (04 Apr 2020 12:58)  T(F): 97.4 (05 Apr 2020 06:01), Max: 103 (04 Apr 2020 12:58)  HR: 86 (05 Apr 2020 06:01) (86 - 117)  BP: 132/60 (05 Apr 2020 06:01) (112/60 - 136/62)  BP(mean): --  RR: 20 (05 Apr 2020 01:30) (17 - 38)  SpO2: 92% (05 Apr 2020 06:01) (92% - 98%)    LABS:                        11.4   11.93 )-----------( 347      ( 04 Apr 2020 13:14 )             32.6     04-04    150<H>  |  115<H>  |  54<H>  ----------------------------<  124<H>  5.5<H>   |  20  |  2.6<H>    Ca    8.1<L>      04 Apr 2020 13:14    TPro  6.8  /  Alb  2.9<L>  /  TBili  0.9  /  DBili  x   /  AST  125<H>  /  ALT  51<H>  /  AlkPhos  52  04-04          Lactate, Blood: 1.7 mmol/L (04-04-20 @ 16:32)          RADIOLOGY:      PHYSICAL EXAM:    GENERAL: NAD, well-developed, AAOx1  HEENT:  Atraumatic, Normocephalic. EOMI, PERRLA, conjunctiva and sclera clear, No JVD  PULMONARY: Clear to auscultation bilaterally; No wheeze  CARDIOVASCULAR: Regular rate and rhythm; No murmurs, rubs, or gallops  GASTROINTESTINAL: Soft, Nontender, Nondistended; Bowel sounds present  MUSCULOSKELETAL:  2+ Peripheral Pulses, No clubbing, cyanosis, or edema  NEUROLOGY: non-focal  SKIN: No rashes or lesions      ADMISSION SUMMARY  Patient is a 89y old Female who presents with a chief complaint of Currently admitted to medicine with the primary diagnosis of SOB (shortness of breath)      ASSESSMENT & PLAN    89 yo F with PMH of CAD s/p PCI, PVD, HTN, CKD4 presented to ED cough, SOB, fever    #) Weakness + cough + fever secondary COVID-19   - symptoms started 2 weeks ago, pt tested positive on 3/25/2020  - CXR shows worsening bilateral opacities   - COVID-19 +, cont airborne + contact precautions   - in the ED, pt put on NRB mask on 15L and sats went up to 98%, oxygen titrated down to 6L on NC, now satting 92%  - Plaquenil 400mg po q12 x2 doses, then 200mg po q12 x 8 doses .  QTc 477 on EKG, QT monitoring not required given baseline QTc  - procal 1.06, crp 19  - tylenol prn for fever   - blood cx, UA pending    #) MARILU on CKD4, hypernatremic on bmp  - likely pre-renal secondary to decreased PO intake  - IVF overnight NS @ 75cc/hr  - monitor BMP    #) CAD s/p PCI   - c/w BB + statin, not on ASA    #) PVD   - c/w Cilostazol    #) HTN   -c/w norvasc and beta blocker    DVT ppx Heparin subQ  Diet DASH/renal  DNR/DNI ESSIE ARMENDARIZ 89y Female  MRN#: [id number]   CODE STATUS:______DNR DNI__      SUBJECTIVE  Patient is a 89y old Female who presents with a chief complaint of Currently admitted to medicine with the primary diagnosis of SOB (shortness of breath)    satting 92% on 6L  no acute events    OBJECTIVE  PAST MEDICAL & SURGICAL HISTORY  PVD (peripheral vascular disease)  Chronic kidney disease (CKD)  CAD (coronary artery disease)  S/P cholecystectomy    ALLERGIES:  No Known Allergies    MEDICATIONS:  STANDING MEDICATIONS  amLODIPine   Tablet 5 milliGRAM(s) Oral daily  atorvastatin 20 milliGRAM(s) Oral at bedtime  carvedilol 25 milliGRAM(s) Oral every 12 hours  cilostazol 100 milliGRAM(s) Oral two times a day  gabapentin 100 milliGRAM(s) Oral daily  heparin  Injectable 5000 Unit(s) SubCutaneous every 12 hours  hydroxychloroquine 400 milliGRAM(s) Oral every 12 hours  hydroxychloroquine 200 milliGRAM(s) Oral every 12 hours  hydroxychloroquine   Oral   memantine 10 milliGRAM(s) Oral two times a day  pantoprazole    Tablet 40 milliGRAM(s) Oral before breakfast  sodium chloride 0.9%. 1000 milliLiter(s) IV Continuous <Continuous>    PRN MEDICATIONS  acetaminophen   Tablet .. 650 milliGRAM(s) Oral every 6 hours PRN      VITAL SIGNS: Last 24 Hours  T(C): 36.3 (05 Apr 2020 06:01), Max: 39.4 (04 Apr 2020 12:58)  T(F): 97.4 (05 Apr 2020 06:01), Max: 103 (04 Apr 2020 12:58)  HR: 86 (05 Apr 2020 06:01) (86 - 117)  BP: 132/60 (05 Apr 2020 06:01) (112/60 - 136/62)  BP(mean): --  RR: 20 (05 Apr 2020 01:30) (17 - 38)  SpO2: 92% (05 Apr 2020 06:01) (92% - 98%)    LABS:                        11.4   11.93 )-----------( 347      ( 04 Apr 2020 13:14 )             32.6     04-04    150<H>  |  115<H>  |  54<H>  ----------------------------<  124<H>  5.5<H>   |  20  |  2.6<H>    Ca    8.1<L>      04 Apr 2020 13:14    TPro  6.8  /  Alb  2.9<L>  /  TBili  0.9  /  DBili  x   /  AST  125<H>  /  ALT  51<H>  /  AlkPhos  52  04-04          Lactate, Blood: 1.7 mmol/L (04-04-20 @ 16:32)          RADIOLOGY:      PHYSICAL EXAM:    GENERAL: NAD, well-developed, AAOx1  HEENT:  Atraumatic, Normocephalic. EOMI, PERRLA, conjunctiva and sclera clear, No JVD  PULMONARY: Clear to auscultation bilaterally; No wheeze  CARDIOVASCULAR: Regular rate and rhythm; No murmurs, rubs, or gallops  GASTROINTESTINAL: Soft, Nontender, Nondistended; Bowel sounds present  MUSCULOSKELETAL:  2+ Peripheral Pulses, No clubbing, cyanosis, or edema  NEUROLOGY: non-focal  SKIN: No rashes or lesions      ADMISSION SUMMARY  Patient is a 89y old Female who presents with a chief complaint of Currently admitted to medicine with the primary diagnosis of SOB (shortness of breath)      ASSESSMENT & PLAN    89 yo F with PMH of CAD s/p PCI, PVD, HTN, CKD4 presented to ED cough, SOB, fever    #) Weakness + cough + fever secondary COVID-19   - symptoms started 2 weeks ago, pt tested positive on 3/25/2020  - CXR shows worsening bilateral opacities   - leukocytosis and lymphopenia  - COVID-19 +, cont airborne + contact precautions   - in the ED, pt put on NRB mask on 15L and sats went up to 98%, oxygen titrated down to 6L on NC, now satting 92%  - Plaquenil 400mg po q12 x2 doses, then 200mg po q12 x 8 doses .  QTc 477 on EKG, QT monitoring not required given baseline QTc  - procal 1.06, crp 19  - tylenol prn for fever   - blood cx, UA pending  - ID consult    #) MARILU on CKD4, hypernatremic on bmp  - likely pre-renal secondary to decreased PO intake  - IVF overnight NS @ 75cc/hr  - monitor BMP    #) CAD s/p PCI   - c/w BB + statin, not on ASA    #) PVD   - c/w Cilostazol    #) HTN   -c/w norvasc and beta blocker    DVT ppx Heparin subQ  Diet DASH/renal  DNR/DNI ESSIE ARMENDARIZ 89y Female  MRN#: [id number]   CODE STATUS:______DNR DNI__      SUBJECTIVE  Patient is a 89y old Female who presents with a chief complaint of Currently admitted to medicine with the primary diagnosis of SOB (shortness of breath)    satting 92% on 6L  no acute events    OBJECTIVE  PAST MEDICAL & SURGICAL HISTORY  PVD (peripheral vascular disease)  Chronic kidney disease (CKD)  CAD (coronary artery disease)  S/P cholecystectomy    ALLERGIES:  No Known Allergies    MEDICATIONS:  STANDING MEDICATIONS  amLODIPine   Tablet 5 milliGRAM(s) Oral daily  atorvastatin 20 milliGRAM(s) Oral at bedtime  carvedilol 25 milliGRAM(s) Oral every 12 hours  cilostazol 100 milliGRAM(s) Oral two times a day  gabapentin 100 milliGRAM(s) Oral daily  heparin  Injectable 5000 Unit(s) SubCutaneous every 12 hours  hydroxychloroquine 400 milliGRAM(s) Oral every 12 hours  hydroxychloroquine 200 milliGRAM(s) Oral every 12 hours  hydroxychloroquine   Oral   memantine 10 milliGRAM(s) Oral two times a day  pantoprazole    Tablet 40 milliGRAM(s) Oral before breakfast  sodium chloride 0.9%. 1000 milliLiter(s) IV Continuous <Continuous>    PRN MEDICATIONS  acetaminophen   Tablet .. 650 milliGRAM(s) Oral every 6 hours PRN      VITAL SIGNS: Last 24 Hours  T(C): 36.3 (05 Apr 2020 06:01), Max: 39.4 (04 Apr 2020 12:58)  T(F): 97.4 (05 Apr 2020 06:01), Max: 103 (04 Apr 2020 12:58)  HR: 86 (05 Apr 2020 06:01) (86 - 117)  BP: 132/60 (05 Apr 2020 06:01) (112/60 - 136/62)  BP(mean): --  RR: 20 (05 Apr 2020 01:30) (17 - 38)  SpO2: 92% (05 Apr 2020 06:01) (92% - 98%)    LABS:                        11.4   11.93 )-----------( 347      ( 04 Apr 2020 13:14 )             32.6     04-04    150<H>  |  115<H>  |  54<H>  ----------------------------<  124<H>  5.5<H>   |  20  |  2.6<H>    Ca    8.1<L>      04 Apr 2020 13:14    TPro  6.8  /  Alb  2.9<L>  /  TBili  0.9  /  DBili  x   /  AST  125<H>  /  ALT  51<H>  /  AlkPhos  52  04-04          Lactate, Blood: 1.7 mmol/L (04-04-20 @ 16:32)          RADIOLOGY:      PHYSICAL EXAM:    GENERAL: NAD, well-developed, AAOx1  HEENT:  Atraumatic, Normocephalic. EOMI, PERRLA, conjunctiva and sclera clear, No JVD  PULMONARY: Clear to auscultation bilaterally; No wheeze  CARDIOVASCULAR: Regular rate and rhythm; No murmurs, rubs, or gallops  GASTROINTESTINAL: Soft, Nontender, Nondistended; Bowel sounds present  MUSCULOSKELETAL:  2+ Peripheral Pulses, No clubbing, cyanosis, or edema  NEUROLOGY: non-focal  SKIN: No rashes or lesions      ADMISSION SUMMARY  Patient is a 89y old Female who presents with a chief complaint of Currently admitted to medicine with the primary diagnosis of SOB (shortness of breath)      ASSESSMENT & PLAN    89 yo F with PMH of CAD s/p PCI, PVD, HTN, CKD4 presented to ED cough, SOB, fever    #) Weakness + cough + fever secondary COVID-19   - symptoms started 2 weeks ago, pt tested positive on 3/25/2020  - CXR shows worsening bilateral opacities   - leukocytosis and lymphopenia  - COVID-19 +, cont airborne + contact precautions   - in the ED, pt put on NRB mask on 15L and sats went up to 98%, oxygen titrated down to 6L on NC, now satting 92%  - Plaquenil 400mg po q12 x2 doses, then 200mg po q12 x 8 doses .  QTc 477 on EKG, QT monitoring not required given baseline QTc  - procal 1.06, crp 19  - procal>.5, will start rocephin and get ID consult  - tylenol prn for fever   - blood cx, UA pending  - ID consult    #) MARILU on CKD4, hypernatremic on bmp  - likely pre-renal secondary to decreased PO intake  - IVF overnight NS @ 75cc/hr  - monitor BMP    #) CAD s/p PCI   - c/w BB + statin, not on ASA    #) PVD   - c/w Cilostazol    #) HTN   -c/w norvasc and beta blocker    DVT ppx Heparin subQ  Diet DASH/renal  DNR/DNI

## 2020-04-05 NOTE — SWALLOW BEDSIDE ASSESSMENT ADULT - ORAL PHASE
Delayed oral transit time/Decreased anterior-posterior movement of the bolus Delayed oral transit time/Stasis in lateral sulci/Decreased anterior-posterior movement of the bolus/Lingual stasis

## 2020-04-05 NOTE — SWALLOW BEDSIDE ASSESSMENT ADULT - SLP PERTINENT HISTORY OF CURRENT PROBLEM
Pt p/w cough, fever, SOb - recent admission tested positive for COVId on 3/25, was d/c 3/29- worsened at home, Pt currently on 6L NC - not in respiratory distress.  RN reports decreased intake, difficulty with meds, and regurgitation.

## 2020-04-06 NOTE — CONSULT NOTE ADULT - SUBJECTIVE AND OBJECTIVE BOX
ESSIE ARMENDARIZ  89y, Female  Allergy: No Known Allergies      CHIEF COMPLAINT:     LOS  2d    HPI:  89 yo F with PMH of CAD s/p PCI, PVD, HTN, CKD4 presented to ER with cough, fever and SOB for two weeks.  Tested positive for COVID on 3/25/2020.  Was discharged 3/29/2020 but symptoms worsened at home.  Worsening fatigue at appetitie at home.  Patient AO x 1 (knowns name).  On 6L NC currently, sitting up in bed, not in respiratory distress.  AO x 1, answering some simple yes/no questions.  Denies any current SOB, chest pain, abdominal pain, headache, neck pain, nausea/vomiting, myalgia. (04 Apr 2020 21:37)      INFECTIOUS DISEASE HISTORY:  readmitted for COVID-19     PAST MEDICAL & SURGICAL HISTORY:  PVD (peripheral vascular disease)  Chronic kidney disease (CKD)  CAD (coronary artery disease)  S/P cholecystectomy      FAMILY HISTORY  non-contributory     SOCIAL HISTORY  Social History:  no alcohol, tobacco, or illicit drug use (04 Apr 2020 21:37)        ROS  General: Denies rigors, nightsweats  HEENT: Denies headache, rhinorrhea, sore throat, eye pain  CV: Denies CP, palpitations  PULM: as noted above   GI: Denies hematemesis, hematochezia, melena  : Denies discharge, hematuria  MSK: Denies arthralgias, myalgias  SKIN: Denies rash, lesions  NEURO: Denies paresthesias, weakness  PSYCH: Denies depression, anxiety    VITALS:  T(F): 97.6, Max: 98.7 (04-05-20 @ 12:27)  HR: 76  BP: 134/61  RR: 22Vital Signs Last 24 Hrs  T(C): 36.4 (06 Apr 2020 06:00), Max: 37.1 (05 Apr 2020 12:27)  T(F): 97.6 (06 Apr 2020 06:00), Max: 98.7 (05 Apr 2020 12:27)  HR: 76 (06 Apr 2020 06:00) (76 - 100)  BP: 134/61 (06 Apr 2020 06:00) (122/87 - 151/70)  BP(mean): --  RR: 22 (06 Apr 2020 06:00) (19 - 22)  SpO2: 94% (06 Apr 2020 06:00) (87% - 98%)    PHYSICAL EXAM:  Gen: Elderly F on NRB  HEENT: NCAT  Resp: b/l chest expansion  Neuro: nonfocal     TESTS & MEASUREMENTS:                        11.4   11.93 )-----------( 347      ( 04 Apr 2020 13:14 )             32.6     04-05    159<H>  |  122<H>  |  70<HH>  ----------------------------<  104<H>  4.4   |  15<L>  |  2.6<H>    Ca    8.0<L>      05 Apr 2020 17:00    TPro  6.2  /  Alb  2.7<L>  /  TBili  0.5  /  DBili  x   /  AST  89<H>  /  ALT  44<H>  /  AlkPhos  56  04-05    eGFR if Non African American: 15 mL/min/1.73M2 (04-05-20 @ 17:00)  eGFR if African American: 18 mL/min/1.73M2 (04-05-20 @ 17:00)    LIVER FUNCTIONS - ( 05 Apr 2020 17:00 )  Alb: 2.7 g/dL / Pro: 6.2 g/dL / ALK PHOS: 56 U/L / ALT: 44 U/L / AST: 89 U/L / GGT: x               Culture - Blood (collected 03-28-20 @ 06:05)  Source: .Blood None  Final Report (04-01-20 @ 20:00):    No Growth Final    Culture - Blood (collected 03-27-20 @ 16:00)  Source: .Blood Blood  Final Report (04-01-20 @ 01:00):    No Growth Final    Culture - Blood (collected 03-25-20 @ 12:06)  Source: .Blood Blood-Peripheral  Final Report (04-01-20 @ 14:00):    No Growth Final    Culture - Blood (collected 03-25-20 @ 12:06)  Source: .Blood Blood-Peripheral  Gram Stain (03-27-20 @ 11:58):    Growth in aerobic bottle:    Gram Positive Rods  Final Report (03-28-20 @ 13:30):    Growth in aerobic bottle: Corynebacterium species    Corynebacterium sp.  presumptively not jeikeium    Culture - Urine (collected 05-17-19 @ 08:05)  Source: .Urine Catheterized  Final Report (05-19-19 @ 17:16):    >100,000 CFU/ml Pseudomonas aeruginosa  Organism: Pseudomonas aeruginosa (05-19-19 @ 17:16)  Organism: Pseudomonas aeruginosa (05-19-19 @ 17:16)      -  Amikacin: S <=16      -  Aztreonam: S <=4      -  Cefepime: S <=4      -  Ceftazidime: S 4      -  Ciprofloxacin: S <=1      -  Gentamicin: S <=4      -  Imipenem: S 2      -  Levofloxacin: S <=2      -  Meropenem: S <=1      -  Piperacillin/Tazobactam: S <=16      -  Tobramycin: S <=4      Method Type: REBECA    Culture - Blood (collected 05-15-19 @ 08:11)  Source: .Blood None  Final Report (05-20-19 @ 18:01):    No growth at 5 days.    Culture - Blood (collected 05-14-19 @ 19:54)  Source: .Blood None  Final Report (05-20-19 @ 03:01):    No growth at 5 days.    Culture - Blood (collected 05-14-19 @ 19:54)  Source: .Blood None  Final Report (05-20-19 @ 03:01):    No growth at 5 days.        Lactate, Blood: 1.7 mmol/L (04-04-20 @ 16:32)      INFECTIOUS DISEASES TESTING  Procalcitonin, Serum: 1.06 ng/mL (04-04-20 @ 16:32)  Procalcitonin, Serum: 0.08 ng/mL (03-29-20 @ 05:41)  Procalcitonin, Serum: 0.11 ng/mL (03-26-20 @ 05:40)  COVID-19 PCR: Detected (03-25-20 @ 12:06)      RADIOLOGY & ADDITIONAL TESTS:  I have personally reviewed the last Chest xray  CXR      CT      CARDIOLOGY TESTING  12 Lead ECG:   Ventricular Rate 96 BPM    Atrial Rate 96 BPM    P-R Interval 126 ms    QRS Duration 66 ms    Q-T Interval 378 ms    QTC Calculation(Bezet) 477 ms    P Axis 33 degrees    R Axis -19 degrees    T Axis -5 degrees    Diagnosis Line Normal sinus rhythm  Voltage criteria for left ventricular hypertrophy  Inferior infarct , age undetermined  Abnormal ECG    Confirmed by Gissell Montalvo MD ([id number]) on 4/4/2020 8:33:29 PM (04-04-20 @ 17:16)  12 Lead ECG:   Ventricular Rate 66 BPM    Atrial Rate 66 BPM    P-R Interval 146 ms    QRS Duration 72 ms    Q-T Interval 410 ms    QTC Calculation(Bezet) 429 ms    P Axis 55 degrees    R Axis 14 degrees    T Axis 58 degrees    Diagnosis Line Normal sinus rhythm  Low voltage QRS  Borderline ECG    Confirmed by MIRELA FLORIAN MD [id number]) on 3/30/2020 2:22:25 PM (03-26-20 @ 15:58)      MEDICATIONS  amLODIPine   Tablet 5  atorvastatin 20  carvedilol 25  cefTRIAXone   IVPB   cefTRIAXone   IVPB 1000  cilostazol 100  dextrose 5%. 1000  gabapentin 100  heparin  Injectable 5000  hydroxychloroquine 200  hydroxychloroquine   memantine 10  pantoprazole    Tablet 40      Weight  Weight (kg): 55.9 (03-25-20 @ 16:20)    ANTIBIOTICS:  cefTRIAXone   IVPB      cefTRIAXone   IVPB 1000 milliGRAM(s) IV Intermittent every 24 hours  hydroxychloroquine 200 milliGRAM(s) Oral every 12 hours  hydroxychloroquine   Oral       ALLERGIES:  No Known Allergies

## 2020-04-06 NOTE — PROGRESS NOTE ADULT - ASSESSMENT
89 yo F with PMH of CAD s/p PCI, PVD, HTN, CKD4 presented to ED cough, SOB, fever    #) Weakness + cough + fever secondary COVID-19   - symptoms started 2 weeks ago, pt tested positive on 3/25/2020  - CXR shows worsening bilateral opacities   -  ID consult-- has seen patient-- she is not responding  on nonrebreather-- family resended DNR and DNI-- will see if palliative care can convince them. I spoke with daughter yesterday and she had reuctantly agreed to DNR and DNI and today she told the intern that she was resending it and feels that we are forcing her to make this decision.    #) MARILU on CKD4, hypernatremic on bmp  - likely pre-renal secondary to decreased PO intake  - IVF   - monitor BMP  # hypernatremia-- started on dextrose since yesterday-- minimal improvement.    #) CAD s/p PCI   - c/w BB + statin, not on ASA    #) PVD   - c/w Cilostazol    #) HTN   -c/w norvasc and beta blocker    very poor prognosis--

## 2020-04-06 NOTE — PROGRESS NOTE ADULT - SUBJECTIVE AND OBJECTIVE BOX
SUBJECTIVE:    Patient is a 89y old Female who presents with a chief complaint of sob (06 Apr 2020 14:08)    Currently admitted to medicine with the primary diagnosis of SOB (shortness of breath)     Today is hospital day 2d.     PAST MEDICAL & SURGICAL HISTORY  PVD (peripheral vascular disease)  Chronic kidney disease (CKD)  CAD (coronary artery disease)  S/P cholecystectomy    ALLERGIES:  No Known Allergies    MEDICATIONS:  STANDING MEDICATIONS  amLODIPine   Tablet 5 milliGRAM(s) Oral daily  atorvastatin 20 milliGRAM(s) Oral at bedtime  azithromycin  IVPB      carvedilol 25 milliGRAM(s) Oral every 12 hours  cefTRIAXone   IVPB      cefTRIAXone   IVPB 1000 milliGRAM(s) IV Intermittent every 24 hours  cilostazol 100 milliGRAM(s) Oral two times a day  dextrose 5%. 1000 milliLiter(s) IV Continuous <Continuous>  gabapentin 100 milliGRAM(s) Oral daily  heparin  Injectable 5000 Unit(s) SubCutaneous every 12 hours  hydroxychloroquine 200 milliGRAM(s) Oral every 12 hours  hydroxychloroquine   Oral   memantine 10 milliGRAM(s) Oral two times a day  pantoprazole    Tablet 40 milliGRAM(s) Oral before breakfast    PRN MEDICATIONS  acetaminophen   Tablet .. 650 milliGRAM(s) Oral every 6 hours PRN    VITALS:   T(F): 97.8  HR: 87  BP: 151/63  RR: 22  SpO2: 94%    LABS:                        10.3   8.40  )-----------( 318      ( 06 Apr 2020 04:30 )             27.9     04-06    156<H>  |  121<H>  |  65<HH>  ----------------------------<  142<H>  3.9   |  22  |  2.2<H>    Ca    8.2<L>      06 Apr 2020 04:30    TPro  6.2  /  Alb  2.7<L>  /  TBili  0.5  /  DBili  x   /  AST  89<H>  /  ALT  44<H>  /  AlkPhos  56  04-05                  RADIOLOGY:    PHYSICAL EXAM:  GEN: No acute distress  LUNGS: Clear to auscultation bilaterally   HEART: S1/S2 present. RRR.   ABD/ GI: Soft, non-tender, non-distended. Bowel sounds present  EXT: NC/NC/NE/2+PP/PALENCIA  NEURO: AAOX0

## 2020-04-06 NOTE — CONSULT NOTE ADULT - ASSESSMENT
89yFemale being evaluated for goals of care      Spoke to pt's daughter (Azul).  Palliative Care services introduced. Clinical condition reviewed, overall poor prognosis understood.  Family understand that invasive measures will likely cause more physical harm than clinical benefit and intubation will likely not change the overall outcome.  Family agreeable to DNR/DNI status.  Wish to continue conservative med mngmnt.  All questions answered in detail.    Recommendations:  reinstate DNR/DNI  cont med mngmnt      We will follow 89yFemale being evaluated for goals of care in setting of advanced dementia, COVID+ pneumonia with AHRF.  Hospital D2  Labs and imaging reviewed, case d/w Hospitalist.  Pt currently unresponsive on NRB.    Spoke to pt's daughter (Azul).  Palliative Care services introduced. Clinical condition reviewed, overall poor prognosis understood.  Family understand that invasive measures will likely cause more physical harm than clinical benefit and intubation will likely not change the overall outcome.  Family agreeable to DNR/DNI status.  Wish to continue conservative med mngmnt.  All questions answered in detail.    Pt will likely  in hospital.    Recommendations:  reinstate DNR/DNI  cont med mngmnt      We will follow  [phone]

## 2020-04-06 NOTE — SWALLOW BEDSIDE ASSESSMENT ADULT - SWALLOW EVAL: RECOMMENDED DIET
NPO with alternate means of nutrition/hydration
NPO by mouth - allow sips of thin liquids for pleasure via straw sip .

## 2020-04-06 NOTE — PROGRESS NOTE ADULT - SUBJECTIVE AND OBJECTIVE BOX
Patients family not called by communication team as notes show patient is not doing well and housestaff is to call family regarding DNR/ DNI status.

## 2020-04-06 NOTE — GOALS OF CARE CONVERSATION - ADVANCED CARE PLANNING - CONVERSATION DETAILS
Spoke to pt's daughter (Azul).  Palliative Care services introduced. Clinical condition reviewed, overall poor prognosis understood.  Family understand that invasive measures will likely cause more physical harm than clinical benefit and intubation will likely not change the overall outcome.  Family agreeable to DNR/DNI status.  Wish to continue conservative med mngmnt.  All questions answered in detail.

## 2020-04-06 NOTE — PROGRESS NOTE ADULT - ASSESSMENT
89 yo F with PMH of CAD s/p PCI, PVD, HTN, CKD4 presented to ED cough, SOB, fever    #) Weakness + cough + fever secondary COVID-19   - symptoms started 2 weeks ago, pt tested positive on 3/25/2020  - CXR shows worsening bilateral opacities   - leukocytosis and lymphopenia  - COVID-19 +, cont airborne + contact precautions   - in the ED, pt put on NRB mask on 15L and sats went up to 98%, oxygen titrated down to 6L on NC, now satting 92%  - Plaquenil 400mg po q12 x2 doses, then 200mg po q12 x 8 doses .  QTc 477 on EKG, QT monitoring not required given baseline QTc  - procal 1.06, crp 19  - procal>.5, will start rocephin and get ID consult  - tylenol prn for fever   - blood cx, UA pending  - ID consult    #) MARILU on CKD4, hypernatremic on bmp  - likely pre-renal secondary to decreased PO intake  - IVF overnight NS @ 75cc/hr  - monitor BMP    #) CAD s/p PCI   - c/w BB + statin, not on ASA    #) PVD   - c/w Cilostazol    #) HTN   -c/w norvasc and beta blocker    DVT ppx Heparin subQ  Diet DASH/renal  DNR/DNI 89 yo F with PMH of CAD s/p PCI, PVD, HTN, CKD4 presented to ED cough, SOB, fever    #) Viral pneumonia secondary COVID-19   - symptoms started 2 weeks ago, pt tested positive on 3/25/2020  - CXR shows worsening bilateral opacities   - leukocytosis and lymphopenia  - COVID-19 +, cont airborne + contact precautions   - in the ED, pt put on NRB mask on 15L and sats went up to 98%; currently saturating 92% on NRB  - Plaquenil 400mg po q12 x2 doses, then 200mg po q12 x 8 doses .  QTc 477 on EKG, QT monitoring not required given baseline QTc  - procal 1.06, crp 19  - if procal > 0.5, will start rocephin and get ID consult  - tylenol prn for fever   - Poor prognosis  - Per family discussion, DNR/DNI  - Palliative consult    # MARILU on CKD4, hypernatremic on bmp  - likely pre-renal secondary to decreased PO intake  - D5 @ 50cc/hr  - monitor BMP    # CAD s/p PCI   - continue BB + statin, not on ASA    # PVD   - continue Cilostazol    # HTN   - continue norvasc and beta blocker    DVT ppx Heparin subQ  Diet DASH/renal  DNR/DNI

## 2020-04-06 NOTE — CONSULT NOTE ADULT - SUBJECTIVE AND OBJECTIVE BOX
REQUESTED OF:     CLINICAL ISSUE TO BE EVALUATED BY CONSULTANT:        ESSIE ARMENDARIZ 89yFemale  HPI:  89 yo F with PMH of CAD s/p PCI, PVD, HTN, CKD4     presented to ER with cough, fever and SOB for two weeks.  Tested positive for COVID on 3/25/2020.  Was discharged 3/29/2020 but symptoms worsened at home.  Worsening fatigue at appetitie at home.  Patient AO x 1 (knowns name).  On 6L NC currently, sitting up in bed, not in respiratory distress.  AO x 1, answering some simple yes/no questions.  Denies any current SOB, chest pain, abdominal pain, headache, neck pain, nausea/vomiting, myalgia. (04 Apr 2020 21:37)        PAST MEDICAL & SURGICAL HISTORY:  PVD (peripheral vascular disease)  Chronic kidney disease (CKD)  CAD (coronary artery disease)  S/P cholecystectomy        PHYSICAL EXAM:      Constitutional:    Eyes:    ENMT:    Neck:    Breasts:    Back:    Respiratory:    Cardiovascular:    Gastrointestinal:    Genitourinary:    Rectal:    Extremities:    Vascular:    Neurological:    Skin:    Lymph Nodes:    Musculoskeletal:    Psychiatric:        T(C): 36.6, Max: 37.1 (17:20)  HR: 87 (76 - 100)  BP: 151/63 (115/55 - 151/70)  RR: 22 (19 - 22)  SpO2: 94% (88% - 95%)      LABS/STUDIES:  04-06    156<H>  |  121<H>  |  65<HH>  ----------------------------<  142<H>  3.9   |  22  |  2.2<H>    Ca    8.2<L>      06 Apr 2020 04:30    TPro  6.2  /  Alb  2.7<L>  /  TBili  0.5  /  DBili  x   /  AST  89<H>  /  ALT  44<H>  /  AlkPhos  56  04-05                            10.3   8.40  )-----------( 318      ( 06 Apr 2020 04:30 )             27.9       MEDICATIONS  (STANDING):  amLODIPine   Tablet 5 milliGRAM(s) Oral daily  atorvastatin 20 milliGRAM(s) Oral at bedtime  azithromycin  IVPB      carvedilol 25 milliGRAM(s) Oral every 12 hours  cefTRIAXone   IVPB      cefTRIAXone   IVPB 1000 milliGRAM(s) IV Intermittent every 24 hours  cilostazol 100 milliGRAM(s) Oral two times a day  dextrose 5%. 1000 milliLiter(s) (50 mL/Hr) IV Continuous <Continuous>  gabapentin 100 milliGRAM(s) Oral daily  heparin  Injectable 5000 Unit(s) SubCutaneous every 12 hours  hydroxychloroquine 200 milliGRAM(s) Oral every 12 hours  hydroxychloroquine   Oral   memantine 10 milliGRAM(s) Oral two times a day  pantoprazole    Tablet 40 milliGRAM(s) Oral before breakfast    MEDICATIONS  (PRN):  acetaminophen   Tablet .. 650 milliGRAM(s) Oral every 6 hours PRN Temp greater or equal to 38C (100.4F)        [place] Symptom Assesment Scale      PPS (Palliative Performance Scale): REQUESTED OF: DR Zambrano	    CLINICAL ISSUE TO BE EVALUATED BY CONSULTANT: Goals of care        ESSIE ARMENDARIZ 89yFemale  HPI:  89 yo F with PMH of CAD s/p PCI, PVD, HTN, CKD4     presented to ER with cough, fever and SOB for two weeks.  Tested positive for COVID on 3/25/2020.  Was discharged 3/29/2020 but symptoms worsened at home.  Worsening fatigue at appetitie at home.  Patient AO x 1 (knowns name).  On 6L NC currently, sitting up in bed, not in respiratory distress.  AO x 1, answering some simple yes/no questions.  Denies any current SOB, chest pain, abdominal pain, headache, neck pain, nausea/vomiting, myalgia. (04 Apr 2020 21:37)        PAST MEDICAL & SURGICAL HISTORY:  PVD (peripheral vascular disease)  Chronic kidney disease (CKD)  CAD (coronary artery disease)  S/P cholecystectomy        PHYSICAL EXAM:  DEFERRED    T(C): 36.6, Max: 37.1 (17:20)  HR: 87 (76 - 100)  BP: 151/63 (115/55 - 151/70)  RR: 22 (19 - 22)  SpO2: 94% (88% - 95%)      LABS/STUDIES:  04-06    156<H>  |  121<H>  |  65<HH>  ----------------------------<  142<H>  3.9   |  22  |  2.2<H>    Ca    8.2<L>      06 Apr 2020 04:30    TPro  6.2  /  Alb  2.7<L>  /  TBili  0.5  /  DBili  x   /  AST  89<H>  /  ALT  44<H>  /  AlkPhos  56  04-05                            10.3   8.40  )-----------( 318      ( 06 Apr 2020 04:30 )             27.9       MEDICATIONS  (STANDING):  amLODIPine   Tablet 5 milliGRAM(s) Oral daily  atorvastatin 20 milliGRAM(s) Oral at bedtime  azithromycin  IVPB      carvedilol 25 milliGRAM(s) Oral every 12 hours  cefTRIAXone   IVPB      cefTRIAXone   IVPB 1000 milliGRAM(s) IV Intermittent every 24 hours  cilostazol 100 milliGRAM(s) Oral two times a day  dextrose 5%. 1000 milliLiter(s) (50 mL/Hr) IV Continuous <Continuous>  gabapentin 100 milliGRAM(s) Oral daily  heparin  Injectable 5000 Unit(s) SubCutaneous every 12 hours  hydroxychloroquine 200 milliGRAM(s) Oral every 12 hours  hydroxychloroquine   Oral   memantine 10 milliGRAM(s) Oral two times a day  pantoprazole    Tablet 40 milliGRAM(s) Oral before breakfast    MEDICATIONS  (PRN):  acetaminophen   Tablet .. 650 milliGRAM(s) Oral every 6 hours PRN Temp greater or equal to 38C (100.4F)        PPS (Palliative Performance Scale): 10

## 2020-04-06 NOTE — PROGRESS NOTE ADULT - SUBJECTIVE AND OBJECTIVE BOX
Hospital Day:  2d    Subjective:    Patient is a 89y old  Female who presents with a chief complaint of sob (06 Apr 2020 14:08)    There were no acute overnight events. The patient was seen and examined at the bedside. No complaints. Denies fever, chills, headache, dizziness, chest pain, palpitations, shortness of breath, abdominal pain, nausea, vomiting, diarrhea.    Past Medical Hx:   PVD (peripheral vascular disease)  Chronic kidney disease (CKD)  CAD (coronary artery disease)    Past Sx:  S/P cholecystectomy    Allergies:  No Known Allergies    Current Meds:   Standng Meds:  amLODIPine   Tablet 5 milliGRAM(s) Oral daily  atorvastatin 20 milliGRAM(s) Oral at bedtime  azithromycin  IVPB      carvedilol 25 milliGRAM(s) Oral every 12 hours  cefTRIAXone   IVPB      cefTRIAXone   IVPB 1000 milliGRAM(s) IV Intermittent every 24 hours  cilostazol 100 milliGRAM(s) Oral two times a day  dextrose 5%. 1000 milliLiter(s) (50 mL/Hr) IV Continuous <Continuous>  gabapentin 100 milliGRAM(s) Oral daily  heparin  Injectable 5000 Unit(s) SubCutaneous every 12 hours  hydroxychloroquine 200 milliGRAM(s) Oral every 12 hours  hydroxychloroquine   Oral   memantine 10 milliGRAM(s) Oral two times a day  pantoprazole    Tablet 40 milliGRAM(s) Oral before breakfast    PRN Meds:  acetaminophen   Tablet .. 650 milliGRAM(s) Oral every 6 hours PRN Temp greater or equal to 38C (100.4F)    HOME MEDICATIONS:  acetaminophen 325 mg oral tablet: 2 tab(s) orally every 6 hours, As needed, Temp greater or equal to 38.5C (101.3F)  amLODIPine 5 mg oral tablet: 1 tab(s) orally once a day  carvedilol 25 mg oral tablet: 1 tab(s) orally 2 times a day  cilostazol 100 mg oral tablet: 1 tab(s) orally 2 times a day  gabapentin 100 mg oral tablet: 1 tab(s) orally once a day  Lipitor 20 mg oral tablet: 1 tab(s) orally once a day  memantine 10 mg oral tablet: 1 tab(s) orally 2 times a day  pantoprazole 40 mg oral delayed release tablet: 1 tab(s) orally once a day (before a meal)      Vital Signs:   T(F): 96.9 (04-06-20 @ 20:15), Max: 98.7 (04-06-20 @ 12:30)  HR: 78 (04-06-20 @ 20:15) (76 - 100)  BP: 134/74 (04-06-20 @ 20:15) (115/55 - 151/63)  RR: 20 (04-06-20 @ 20:15) (19 - 22)  SpO2: 96% (04-06-20 @ 20:15) (91% - 96%)        Physical Exam:   General: Patient resting comfortably in bed, NAD  HEENT: NCAT, conjunctiva clear, sclera white, PEERLA, EOMI, moist mucous membranes, normal orophaynx  Neck: No masses, no JVD, no cervical lymphadenopathy  Respiratory: good inspiratory effort; lungs clear to auscultation bilaterally  CV: Normal rate, regular rhythm, normal S1/S2, no rubs, gallops, murmurs  GI: abdomen soft, non-tender, non-distended, no masses, normal bowel sounds  Extremities: Well-perfused, no clubbing, no cyanosis, no lower extremity edema bilaterally  Skin: warm and dry  Neurology: AAOx3, mentating appropriately, follows commands, nonfocal    Labs:                         10.3   8.40  )-----------( 318      ( 06 Apr 2020 04:30 )             27.9     Neutophil% 90.7, Lymphocyte% 3.8, Monocyte% 3.5, Bands% 1.7 04-06-20 @ 04:30    06 Apr 2020 04:30    156    |  121    |  65     ----------------------------<  142    3.9     |  22     |  2.2      Ca    8.2        06 Apr 2020 04:30    TPro  6.2    /  Alb  2.7    /  TBili  0.5    /  DBili  x      /  AST  89     /  ALT  44     /  AlkPhos  56     05 Apr 2020 17:00                            Radiology: Hospital Day:  2d    Subjective:    Patient is a 89y old  Female who presents with a chief complaint of sob (06 Apr 2020 14:08)    There were no acute overnight events. The patient was seen and examined at the bedside. Patient currently requiring non-rebreather mask.    Past Medical Hx:   PVD (peripheral vascular disease)  Chronic kidney disease (CKD)  CAD (coronary artery disease)    Past Sx:  S/P cholecystectomy    Allergies:  No Known Allergies    Current Meds:   Standng Meds:  amLODIPine   Tablet 5 milliGRAM(s) Oral daily  atorvastatin 20 milliGRAM(s) Oral at bedtime  azithromycin  IVPB      carvedilol 25 milliGRAM(s) Oral every 12 hours  cefTRIAXone   IVPB      cefTRIAXone   IVPB 1000 milliGRAM(s) IV Intermittent every 24 hours  cilostazol 100 milliGRAM(s) Oral two times a day  dextrose 5%. 1000 milliLiter(s) (50 mL/Hr) IV Continuous <Continuous>  gabapentin 100 milliGRAM(s) Oral daily  heparin  Injectable 5000 Unit(s) SubCutaneous every 12 hours  hydroxychloroquine 200 milliGRAM(s) Oral every 12 hours  hydroxychloroquine   Oral   memantine 10 milliGRAM(s) Oral two times a day  pantoprazole    Tablet 40 milliGRAM(s) Oral before breakfast    PRN Meds:  acetaminophen   Tablet .. 650 milliGRAM(s) Oral every 6 hours PRN Temp greater or equal to 38C (100.4F)    HOME MEDICATIONS:  acetaminophen 325 mg oral tablet: 2 tab(s) orally every 6 hours, As needed, Temp greater or equal to 38.5C (101.3F)  amLODIPine 5 mg oral tablet: 1 tab(s) orally once a day  carvedilol 25 mg oral tablet: 1 tab(s) orally 2 times a day  cilostazol 100 mg oral tablet: 1 tab(s) orally 2 times a day  gabapentin 100 mg oral tablet: 1 tab(s) orally once a day  Lipitor 20 mg oral tablet: 1 tab(s) orally once a day  memantine 10 mg oral tablet: 1 tab(s) orally 2 times a day  pantoprazole 40 mg oral delayed release tablet: 1 tab(s) orally once a day (before a meal)      Vital Signs:   T(F): 96.9 (04-06-20 @ 20:15), Max: 98.7 (04-06-20 @ 12:30)  HR: 78 (04-06-20 @ 20:15) (76 - 100)  BP: 134/74 (04-06-20 @ 20:15) (115/55 - 151/63)  RR: 20 (04-06-20 @ 20:15) (19 - 22)  SpO2: 96% (04-06-20 @ 20:15) (91% - 96%)        Physical Exam:   Per Attending Note    Labs:                         10.3   8.40  )-----------( 318      ( 06 Apr 2020 04:30 )             27.9     Neutophil% 90.7, Lymphocyte% 3.8, Monocyte% 3.5, Bands% 1.7 04-06-20 @ 04:30    06 Apr 2020 04:30    156    |  121    |  65     ----------------------------<  142    3.9     |  22     |  2.2      Ca    8.2        06 Apr 2020 04:30    TPro  6.2    /  Alb  2.7    /  TBili  0.5    /  DBili  x      /  AST  89     /  ALT  44     /  AlkPhos  56     05 Apr 2020 17:00                            Radiology:   < from: Xray Chest 1 View- PORTABLE-Routine (04.06.20 @ 07:34) >    EXAM:  XR CHEST PORTABLE ROUTINE 1V            PROCEDURE DATE:  04/06/2020            INTERPRETATION:  Clinical History / Reason for exam: Shortness of breath, Covid    Comparison : Chest radiograph 4/4/2020.    Technique/Positioning: Single frontal radiograph of the chest. Chin obscuring view of right apical lung.    Findings:    Support devices: None.    Cardiac/mediastinum/hilum: Unchanged.    Lung parenchyma/Pleura: Increased diffuse bilateral airspace opacities. No pneumothorax.    Skeleton/soft tissues: Unchanged.    Impression:      Increased diffuse bilateral airspace opacities.                  ALETHA BRIZUELA M.D., RESIDENT RADIOLOGIST  This document has been electronically signed.  DIAZ ROBERTSON M.D., ATTENDING RADIOLOGIST  This document has been electronically signed. Apr 6 2020  2:23PM                < end of copied text >

## 2020-04-06 NOTE — CONSULT NOTE ADULT - ASSESSMENT
ASSESSMENT  89 yo F with PMH of CAD s/p PCI, PVD, HTN, CKD4 presented to ER with cough, fever and SOB for two weeks.  Tested positive for COVID on 3/25/2020.  Was discharged 3/29/2020 but symptoms worsened at home.    IMPRESSION  #Readmission for COVID-19 PNA    CXR worsening bilateral opacities since previous admission     Procalcitonin, Serum: 1.06 ng/mL (04-04-20 @ 16:32)    COVID-19 PCR: Detected (03-25-20 @ 12:06)  #Severe Sepsis on admission , T>101F, Pulse>90, Resp Rate>20,  MARILU   #Hypernatremia   #QTC Calculation(Bezet) 477 ms   #Cytokine Release Syndrome   C-Reactive Protein, Serum: 19.32 mg/dL (04-04-20 @ 16:32)    RECOMMENDATIONS  - Start Azithro 500mg PO x1, then 250mg PO daily x 4 days  - PLAQUENIL 400mg PO q12h x 2 doses, then 200mg BID PO x 4 days (we are now able to discharge patients on plaquenil)  - Monitor QTC after 2nd plaquenil dose. If patient also on azithro would monitor more frequently   - Supportive care   - On Ceftriaxone 1g q24h IV  - Grave prognosis  - Check D-dimer if >1000 would consider lovenox       Spectra [id number]

## 2020-04-07 NOTE — PROGRESS NOTE ADULT - SUBJECTIVE AND OBJECTIVE BOX
Hospital Day:  3d    Subjective:    Patient is a 89y old  Female who presents with a chief complaint of sob (06 Apr 2020 14:08)    There were no acute overnight events. The patient was seen and examined at the bedside. No complaints. Denies fever, chills, headache, dizziness, chest pain, palpitations, shortness of breath, abdominal pain, nausea, vomiting, diarrhea.    Past Medical Hx:   PVD (peripheral vascular disease)  Chronic kidney disease (CKD)  CAD (coronary artery disease)    Past Sx:  S/P cholecystectomy    Allergies:  No Known Allergies    Current Meds:   Standng Meds:  azithromycin  IVPB 250 milliGRAM(s) IV Intermittent every 24 hours  azithromycin  IVPB      cefTRIAXone   IVPB      cefTRIAXone   IVPB 1000 milliGRAM(s) IV Intermittent every 24 hours  cilostazol 100 milliGRAM(s) Oral two times a day  dextrose 5%. 1000 milliLiter(s) (50 mL/Hr) IV Continuous <Continuous>  heparin  Injectable 5000 Unit(s) SubCutaneous every 12 hours  hydroxychloroquine 200 milliGRAM(s) Oral every 12 hours  hydroxychloroquine   Oral   morphine  - Injectable 1 milliGRAM(s) IV Push once  pantoprazole  Injectable 40 milliGRAM(s) IV Push daily    PRN Meds:  acetaminophen  Suppository .. 650 milliGRAM(s) Rectal every 6 hours PRN Temp greater or equal to 38C (100.4F)    HOME MEDICATIONS:  acetaminophen 325 mg oral tablet: 2 tab(s) orally every 6 hours, As needed, Temp greater or equal to 38.5C (101.3F)  amLODIPine 5 mg oral tablet: 1 tab(s) orally once a day  carvedilol 25 mg oral tablet: 1 tab(s) orally 2 times a day  cilostazol 100 mg oral tablet: 1 tab(s) orally 2 times a day  gabapentin 100 mg oral tablet: 1 tab(s) orally once a day  Lipitor 20 mg oral tablet: 1 tab(s) orally once a day  memantine 10 mg oral tablet: 1 tab(s) orally 2 times a day  pantoprazole 40 mg oral delayed release tablet: 1 tab(s) orally once a day (before a meal)      Vital Signs:   T(F): 97.1 (04-07-20 @ 12:00), Max: 97.8 (04-06-20 @ 15:31)  HR: 90 (04-07-20 @ 12:00) (78 - 94)  BP: 155/68 (04-07-20 @ 12:00) (134/74 - 157/62)  RR: 20 (04-07-20 @ 12:00) (20 - 22)  SpO2: 88% (04-07-20 @ 12:00) (85% - 96%)        Physical Exam:   General: Patient resting comfortably in bed, NAD  HEENT: NCAT, conjunctiva clear, sclera white, PEERLA, EOMI, moist mucous membranes, normal orophaynx  Neck: No masses, no JVD, no cervical lymphadenopathy  Respiratory: good inspiratory effort; lungs clear to auscultation bilaterally  CV: Normal rate, regular rhythm, normal S1/S2, no rubs, gallops, murmurs  GI: abdomen soft, non-tender, non-distended, no masses, normal bowel sounds  Extremities: Well-perfused, no clubbing, no cyanosis, no lower extremity edema bilaterally  Skin: warm and dry  Neurology: AAOx3, mentating appropriately, follows commands, nonfocal    Labs:                         10.3   8.40  )-----------( 318      ( 06 Apr 2020 04:30 )             27.9       06 Apr 2020 04:30    156    |  121    |  65     ----------------------------<  142    3.9     |  22     |  2.2      Ca    8.2        06 Apr 2020 04:30    TPro  6.2    /  Alb  2.7    /  TBili  0.5    /  DBili  x      /  AST  89     /  ALT  44     /  AlkPhos  56     05 Apr 2020 17:00                            Radiology:   < from: Xray Chest 1 View- PORTABLE-Routine (04.06.20 @ 07:34) >    EXAM:  XR CHEST PORTABLE ROUTINE 1V            PROCEDURE DATE:  04/06/2020            INTERPRETATION:  Clinical History / Reason for exam: Shortness of breath, Covid    Comparison : Chest radiograph 4/4/2020.    Technique/Positioning: Single frontal radiograph of the chest. Chin obscuring view of right apical lung.    Findings:    Support devices: None.    Cardiac/mediastinum/hilum: Unchanged.    Lung parenchyma/Pleura: Increased diffuse bilateral airspace opacities. No pneumothorax.    Skeleton/soft tissues: Unchanged.    Impression:      Increased diffuse bilateral airspace opacities.                  ALETHA BRIZUELA M.D., RESIDENT RADIOLOGIST  This document has been electronically signed.  DIAZ ROBERTSON M.D., ATTENDING RADIOLOGIST  This document has been electronically signed. Apr 6 2020  2:23PM                < end of copied text > Hospital Day:  3d    Subjective:    Patient is a 89y old  Female who presents with a chief complaint of sob (06 Apr 2020 14:08)    There were no acute overnight events. The patient was seen and examined at the bedside. Patient complains of dyspnea. Looks unwell.     Past Medical Hx:   PVD (peripheral vascular disease)  Chronic kidney disease (CKD)  CAD (coronary artery disease)    Past Sx:  S/P cholecystectomy    Allergies:  No Known Allergies    Current Meds:   Standng Meds:  azithromycin  IVPB 250 milliGRAM(s) IV Intermittent every 24 hours  azithromycin  IVPB      cefTRIAXone   IVPB      cefTRIAXone   IVPB 1000 milliGRAM(s) IV Intermittent every 24 hours  cilostazol 100 milliGRAM(s) Oral two times a day  dextrose 5%. 1000 milliLiter(s) (50 mL/Hr) IV Continuous <Continuous>  heparin  Injectable 5000 Unit(s) SubCutaneous every 12 hours  hydroxychloroquine 200 milliGRAM(s) Oral every 12 hours  hydroxychloroquine   Oral   morphine  - Injectable 1 milliGRAM(s) IV Push once  pantoprazole  Injectable 40 milliGRAM(s) IV Push daily    PRN Meds:  acetaminophen  Suppository .. 650 milliGRAM(s) Rectal every 6 hours PRN Temp greater or equal to 38C (100.4F)    HOME MEDICATIONS:  acetaminophen 325 mg oral tablet: 2 tab(s) orally every 6 hours, As needed, Temp greater or equal to 38.5C (101.3F)  amLODIPine 5 mg oral tablet: 1 tab(s) orally once a day  carvedilol 25 mg oral tablet: 1 tab(s) orally 2 times a day  cilostazol 100 mg oral tablet: 1 tab(s) orally 2 times a day  gabapentin 100 mg oral tablet: 1 tab(s) orally once a day  Lipitor 20 mg oral tablet: 1 tab(s) orally once a day  memantine 10 mg oral tablet: 1 tab(s) orally 2 times a day  pantoprazole 40 mg oral delayed release tablet: 1 tab(s) orally once a day (before a meal)      Vital Signs:   T(F): 97.1 (04-07-20 @ 12:00), Max: 97.8 (04-06-20 @ 15:31)  HR: 90 (04-07-20 @ 12:00) (78 - 94)  BP: 155/68 (04-07-20 @ 12:00) (134/74 - 157/62)  RR: 20 (04-07-20 @ 12:00) (20 - 22)  SpO2: 88% (04-07-20 @ 12:00) (85% - 96%)        Physical Exam:   General: Patient resting in bed, cachectic, ill-appearing  HEENT: NCAT, conjunctiva clear, sclera white, PEERLA, EOMI, moist mucous membranes, normal orophaynx  Respiratory: NRB mask in place; bilateral crackles  CV: Normal rate, regular rhythm, normal S1/S2  GI: abdomen soft, non-tender, non-distended  Extremities: no lower extremity edema bilaterally  Skin: warm and dry  Neurology: AAOx3, nonfocal    Labs:                         10.3   8.40  )-----------( 318      ( 06 Apr 2020 04:30 )             27.9       06 Apr 2020 04:30    156    |  121    |  65     ----------------------------<  142    3.9     |  22     |  2.2      Ca    8.2        06 Apr 2020 04:30    TPro  6.2    /  Alb  2.7    /  TBili  0.5    /  DBili  x      /  AST  89     /  ALT  44     /  AlkPhos  56     05 Apr 2020 17:00                            Radiology:   < from: Xray Chest 1 View- PORTABLE-Routine (04.06.20 @ 07:34) >    EXAM:  XR CHEST PORTABLE ROUTINE 1V            PROCEDURE DATE:  04/06/2020            INTERPRETATION:  Clinical History / Reason for exam: Shortness of breath, Covid    Comparison : Chest radiograph 4/4/2020.    Technique/Positioning: Single frontal radiograph of the chest. Chin obscuring view of right apical lung.    Findings:    Support devices: None.    Cardiac/mediastinum/hilum: Unchanged.    Lung parenchyma/Pleura: Increased diffuse bilateral airspace opacities. No pneumothorax.    Skeleton/soft tissues: Unchanged.    Impression:      Increased diffuse bilateral airspace opacities.                  ALETHA BRIZUELA M.D., RESIDENT RADIOLOGIST  This document has been electronically signed.  DIAZ ROBERTSON M.D., ATTENDING RADIOLOGIST  This document has been electronically signed. Apr 6 2020  2:23PM                < end of copied text >

## 2020-04-07 NOTE — CHART NOTE - NSCHARTNOTEFT_GEN_A_CORE
Palliative Care LMSW reached out to pt.'s daughter, Azul Briceño via telephone to provide supportive counseling.  Azul has good understand of patient's overall condition and poor prognosis.  Pt. is coping adaptively and states she has a good support system.  Visitation arranged for daughter given pt.'s poor clinical status.  Will continue to follow for supportive counseling.

## 2020-04-07 NOTE — PROGRESS NOTE ADULT - ATTENDING COMMENTS
Patient seen and examined independently. Agree with resident note.  # COVID positive PNA-- patient desaturating on NRB-- made DNR and DNI and palliative care was called yesterday-- no labs to be drawn at this time as she will not benefit from blood draws at this time.  dced all meds except ABX  very poor prognosis explained to family

## 2020-04-07 NOTE — PROGRESS NOTE ADULT - ASSESSMENT
89 yo F with PMH of CAD s/p PCI, PVD, HTN, CKD4 presented to ED cough, SOB, fever    # Viral pneumonia secondary COVID-19   - symptoms started 2 weeks ago, pt tested positive on 3/25/2020  - CXR shows worsening bilateral opacities   - leukocytosis and lymphopenia  - COVID-19 +, cont airborne + contact precautions   - in the ED, pt put on NRB mask on 15L and sats went up to 98%; currently saturating 88% on NRB  - Procalcitonin 1.06 > repeat level sent, will follow up; CRP 19.32 > 22.05  - Plaquenil 400mg po q12 x2 doses, then 200mg po q12 x 8 doses. QTc 477 on EKG, QT monitoring not required given baseline QTc  - Azithromycin   - Ceftriaxone  - tylenol prn for fever   - Poor prognosis  - Per family discussion, DNR/DNI  - Palliative consult    # MARILU on CKD4, hypernatremic on bmp  - likely pre-renal secondary to decreased PO intake  - D5 @ 50cc/hr  - monitor BMP    # CAD s/p PCI   - continue BB + statin, not on ASA    # PVD   - continue Cilostazol    # HTN   - continue norvasc and beta blocker    #DVT ppx: Heparin subQ  #Diet: DASH/renal  DNR/DNI 89 yo F with PMH of CAD s/p PCI, PVD, HTN, CKD4 presented to ED cough, SOB, fever    # Viral pneumonia secondary COVID-19   - symptoms started 2 weeks ago, pt tested positive on 3/25/2020  - CXR shows worsening bilateral opacities   - leukocytosis and lymphopenia  - COVID-19 +, cont airborne + contact precautions   - in the ED, pt put on NRB mask on 15L and sats went up to 98%; currently saturating 88% on NRB  - Procalcitonin 1.06 > repeat level sent, will follow up; CRP 19.32 > 22.05  - s/p Plaquenil 400mg po q12h loading dose x1 day  - continue plaquenil 200mg po q12h (day 3 of 4). QTc 477 on EKG, QT monitoring not required given baseline QTc  - s/p Azithromycin 500mg IV x1 dose  - Azithromycin 250mg IV q24h (day 1 of 4)   - Ceftriaxone 1g IV daily  - tylenol prn for fever   - Poor prognosis  - Per family discussion, DNR/DNI  - Palliative on board    # MARILU on CKD4, hypernatremic on bmp  - likely pre-renal secondary to decreased PO intake  - D5 @ 50cc/hr  - monitor BMP    # CAD s/p PCI   - continue BB + statin, not on ASA    # PVD   - continue Cilostazol    # HTN   - continue norvasc and beta blocker    #DVT ppx: Heparin subQ  #Diet: DASH/renal  DNR/DNI

## 2020-04-08 NOTE — PROGRESS NOTE ADULT - ASSESSMENT
89 yo F with PMH of CAD s/p PCI, PVD, HTN, CKD4 presented to ED cough, SOB, fever    # Viral pneumonia secondary COVID-19   - symptoms started 2 weeks ago, pt tested positive on 3/25/2020  - CXR shows worsening bilateral opacities   - leukocytosis and lymphopenia  - COVID-19 +, cont airborne + contact precautions   - in the ED, pt put on NRB mask on 15L and sats went up to 98%  - currently saturating 59-88% on NRB; dyspneic with accessory muscle use  - Procalcitonin 1.06 > 1.05; CRP 19.32 > 22.05  - s/p Plaquenil   - s/p Azithromycin 500mg IV loading dose  - Azithromycin 250mg IV q24h (day 2 of 4)   - Ceftriaxone 1g IV daily  - tylenol suppository prn for fever   - Extremely poor prognosis; patient is actively dying  - Per family discussion, DNR/DNI  - Palliative on board  - Morphine 2mg IV PRN for respiratory distress; if patient continues to be dyspneic with pushes, will start morphine drip    # MARILU on CKD4, hypernatremic on bmp  - likely pre-renal secondary to decreased PO intake  - D5 @ 50cc/hr  - will hold further blood draws    # CAD s/p PCI / PVD / HTN  - holding oral meds as patient cannot currently swallow, and she is actively dying    #DVT ppx: Heparin subQ  #Diet: DASH/renal  DNR/DNI

## 2020-04-08 NOTE — PROVIDER CONTACT NOTE (OTHER) - SITUATION
Pt noted with sat 74% on md mehdi cantor aware, spoke to pts family. Comfort measures only at this time, no vs, IVF D/c'd. Pt ordered for morphine drip at this time.
Hcp called regarding patient DNR/DNI status and had multiple questions. MD Reaves given phone number and will call patient family.

## 2020-04-08 NOTE — PROGRESS NOTE ADULT - SUBJECTIVE AND OBJECTIVE BOX
Hospital Day:  4d    Subjective:    Patient is a 89y old  Female who presents with a chief complaint of sob (06 Apr 2020 14:08)    There were no acute overnight events. The patient was seen and examined at the bedside. Patient is drowsy and minimally verbally responsive. Dyspneic with accessory muscle use.    Past Medical Hx:   PVD (peripheral vascular disease)  Chronic kidney disease (CKD)  CAD (coronary artery disease)    Past Sx:  S/P cholecystectomy    Allergies:  No Known Allergies    Current Meds:   Standng Meds:  azithromycin  IVPB 250 milliGRAM(s) IV Intermittent every 24 hours  azithromycin  IVPB      cefTRIAXone   IVPB      cefTRIAXone   IVPB 1000 milliGRAM(s) IV Intermittent every 24 hours  dextrose 5%. 1000 milliLiter(s) (50 mL/Hr) IV Continuous <Continuous>  pantoprazole  Injectable 40 milliGRAM(s) IV Push daily    PRN Meds:  acetaminophen  Suppository .. 650 milliGRAM(s) Rectal every 6 hours PRN Temp greater or equal to 38C (100.4F)  morphine  - Injectable 2 milliGRAM(s) IV Push every 2 hours PRN respiration distress.    HOME MEDICATIONS:  acetaminophen 325 mg oral tablet: 2 tab(s) orally every 6 hours, As needed, Temp greater or equal to 38.5C (101.3F)  amLODIPine 5 mg oral tablet: 1 tab(s) orally once a day  carvedilol 25 mg oral tablet: 1 tab(s) orally 2 times a day  cilostazol 100 mg oral tablet: 1 tab(s) orally 2 times a day  gabapentin 100 mg oral tablet: 1 tab(s) orally once a day  Lipitor 20 mg oral tablet: 1 tab(s) orally once a day  memantine 10 mg oral tablet: 1 tab(s) orally 2 times a day  pantoprazole 40 mg oral delayed release tablet: 1 tab(s) orally once a day (before a meal)      Vital Signs:   T(F): 96 (04-08-20 @ 08:00), Max: 97.6 (04-07-20 @ 15:25)  HR: 91 (04-08-20 @ 08:00) (70 - 94)  BP: 156/67 (04-08-20 @ 08:00) (119/59 - 167/70)  RR: 27 (04-08-20 @ 08:00) (20 - 48)  SpO2: 80% (04-08-20 @ 08:00) (59% - 88%)        Physical Exam:   General: Patient resting in bed, cachectic, ill-appearing, respiratory distress  Respiratory: NRB mask in place; bilateral crackles; using accessory muscles to breathe  CV: Normal rate, regular rhythm, normal S1/S2  GI: abdomen soft, non-tender, non-distended  Extremities: no lower extremity edema bilaterally  Skin: warm and dry  Neurology: nonfocal    Labs:                         12.1   9.66  )-----------( 276      ( 08 Apr 2020 04:30 )             32.7     Neutophil% 88.2, Lymphocyte% 5.3, Monocyte% 3.8, Bands% 1.9 04-08-20 @ 04:30    08 Apr 2020 04:30    157    |  121    |  55     ----------------------------<  131    4.0     |  17     |  1.9      Ca    8.7        08 Apr 2020 04:30    TPro  6.4    /  Alb  2.5    /  TBili  0.7    /  DBili  x      /  AST  61     /  ALT  38     /  AlkPhos  126    08 Apr 2020 04:30

## 2020-04-08 NOTE — PROGRESS NOTE ADULT - ASSESSMENT
89yFemale being evaluated for comfort care in setting of AHRF secondary to COVID infection.     Pt in end stages of life. Primary team spoke to daughter, comfort measures only reviewed in detail.  No further blood draws, no IVF  Spoke to daughter (Azul), all questions answered in detail  Support provided    Recommendations:  agree to start morphine gtt @2mg/hr  morphine 2mg IV q2h PRN res distress  DNR/DNI/CMO  End of life care      We will follow  [phone]

## 2020-04-08 NOTE — DISCHARGE NOTE FOR THE EXPIRED PATIENT - SECONDARY DIAGNOSIS.
Hypoxia CAD (coronary artery disease) PVD (peripheral vascular disease) Stage 3 chronic kidney disease

## 2020-04-08 NOTE — PROGRESS NOTE ADULT - SUBJECTIVE AND OBJECTIVE BOX
REQUESTED OF:     CLINICAL ISSUE TO BE EVALUATED BY CONSULTANT:        ESSIE ARMENDARIZ 89yFemale  HPI:  89 yo F with PMH of CAD s/p PCI, PVD, HTN, CKD4     presented to ER with cough, fever and SOB for two weeks.  Tested positive for COVID on 3/25/2020.  Was discharged 3/29/2020 but symptoms worsened at home.  Worsening fatigue at appetitie at home.  Patient AO x 1 (knowns name).  On 6L NC currently, sitting up in bed, not in respiratory distress.  AO x 1, answering some simple yes/no questions.  Denies any current SOB, chest pain, abdominal pain, headache, neck pain, nausea/vomiting, myalgia. (04 Apr 2020 21:37)        PAST MEDICAL & SURGICAL HISTORY:  PVD (peripheral vascular disease)  Chronic kidney disease (CKD)  CAD (coronary artery disease)  S/P cholecystectomy        PHYSICAL EXAM:  DEFERRED      T(C): 35.7, Max: 36.4 (15:25)  HR: 87 (70 - 94)  BP: 119/67 (119/59 - 167/70)  RR: 30 (20 - 48)  SpO2: 74% (59% - 85%)      LABS/STUDIES:  04-08    157<H>  |  121<H>  |  55<H>  ----------------------------<  131<H>  4.0   |  17  |  1.9<H>    Ca    8.7      08 Apr 2020 04:30    TPro  6.4  /  Alb  2.5<L>  /  TBili  0.7  /  DBili  x   /  AST  61<H>  /  ALT  38  /  AlkPhos  126<H>  04-08                            12.1   9.66  )-----------( 276      ( 08 Apr 2020 04:30 )             32.7       MEDICATIONS  (STANDING):  morphine  Infusion. 2 mG/Hr (2 mL/Hr) IV Continuous <Continuous>    MEDICATIONS  (PRN):  acetaminophen  Suppository .. 650 milliGRAM(s) Rectal every 6 hours PRN Temp greater or equal to 38C (100.4F)  morphine  - Injectable 2 milliGRAM(s) IV Push every 2 hours PRN respiration distress.          PPS (Palliative Performance Scale): 10

## 2020-04-08 NOTE — PROGRESS NOTE ADULT - ATTENDING COMMENTS
I saw and examined the patient independently. I agree with above history, physical exam and plan of care which I have reviewed and edited where appropriate with following additions.     patient seemed to be in respiratory distress using accessory muscles to breath with NRB mask on/lethargic/ DNR/DNI.     acute hypoxic respiratory failure due to Covid -19 pneumonia:   patient is respiratory distress/actively dying.   desating to 50-80's on NRB mask.   palliative has evaluated the patient.   start morphine drip for comfort care/distress.     family is aware of patient's poor prognosis and have decided DNR/DNI status .       dispo/Pending: acute/ in resp. distress/ actively dying-comfort care. I saw and examined the patient independently. I agree with above history, physical exam and plan of care which I have reviewed and edited where appropriate with following additions.     patient seemed to be in respiratory distress using accessory muscles to breath with NRB mask on/lethargic/ DNR/DNI.     acute hypoxic respiratory failure due to Covid -19 pneumonia:   patient is respiratory distress/actively dying.   desating to 50-80's on NRB mask.   palliative has evaluated the patient.   start morphine drip for comfort care/distress.     family is aware of patient's poor prognosis and have decided DNR/DNI status .       dispo/Pending: acute/ in resp. distress/ actively dying-comfort care.  discussion : discussed patient's poor prognosis / worsening condition today with daughter Azul and she is allowed to visit as arranged by palliative care team.

## 2020-04-08 NOTE — DISCHARGE NOTE FOR THE EXPIRED PATIENT - HOSPITAL COURSE
89 yo F with PMH of CAD s/p PCI, PVD, HTN, CKD4 presented to ER with complaint of two weeks of cough, fever and SOB. Tested positive for COVID19 on 3/25/2020. Was discharged 3/29/2020 but symptoms worsened at home. Had been having worsening fatigue and appetite at home. Patient AO x 1 (knowns name).  On 6L NC currently, sitting up in bed, not in respiratory distress.  AO x 1, answering some simple yes/no questions.  Denies any current SOB, chest pain, abdominal pain, headache, neck pain, nausea/vomiting, myalgia. 89 yo F with PMH of CAD s/p PCI, PVD, HTN, CKD4 presented to ER with complaint of two weeks of cough, fever and SOB. Tested positive for COVID19 on 3/25/2020. Was discharged 3/29/2020 but symptoms worsened at home. Had been having worsening fatigue and appetite at home. Patient was AO x 1 (knew name). On admission the patient was maintaining her SaO2 on 6L O2 via NC. Gradually, her oxygen requirement increased and she was placed on 10L O2 via non-rebreather mask. Per the goals of care discussion with the family, the patient was made DNR/DNI due to a probable very poor prognosis if put on a ventilator. The patient continued to be treated medically with antibiotics and fluids until 4/8/20, at which point the family agreed to comfort measures in lieu of full medical treatment, as the patient was continuing to desaturate on non-rebreather mask. The patient was placed on a morphine drip for comfort and medical interventions were discontinued. She was was pronounced dead the same day, 4/8/20 at 2:04PM.

## 2020-04-22 NOTE — CDI QUERY NOTE - NSCDIOTHERTXTBX_GEN_ALL_CORE_HH
DOCUMENTATION CLARIFICATION FORM     Encounter #: [id number]                                                 Patient’s Name: Gabi Guerra  Medical Record #: [id number]                                       Admit Date: 2020  CDI Specialist/: Iggy                                          Contact #: [phone]    Dear Dr. Melchor,                       Date: 2020                   The Physician’s or Provider’s documentation of the patient’s presentation, evaluation and  medical management, as identified below, may support a diagnosis that is not documented in the medical record.  In order to accurately capture all diagnoses to the greatest degree of specificity reflecting the patient’s actual severity of illness, the documentation in this patient’s medical record requires additional clarification.  Please include more specific documentation, either known or suspected, of a corresponding diagnosis associated with the clinical information described below in your Progress Note and/or Discharge Summary.    89yoF Readmission for worsening Covid 19 Pneumonia    In ED T 103, , RR 38 Neutrophil % 89.2 Lymphocyte % 5.0 Procalcitonin 1.06  Chest XR Worsening bilateral opacities since prior  Started on  plaquenil, azithromycin IVPB, Rocephin 1000mg IVPB  4-6 ID Consult-   Severe Sepsis on admission, T>101F, Pulse>90, Resp Rate>20, MARILU. Cytokine Release Syndrome- C-Reactive Protein, Serum: 19.32 mg/dL (20 @ 16:32)  Patient DNR/DNI- advanced to comfort care an  2020 @1404    Based on the clinical indicators and your professional judgment please clarify if you agree with the Infectious Disease Consultant documentation of the sepsis diagnosis:  (  ) Yes, Severe Sepsis 2/2 Covid 19 PNA was present on admission  (  ) Other (please specify): _______  (  ) Unable to clinically determine       Documentation clarification is required for compliance, accuracy in coding and billing, and reporting severity of illness, quality data   and risk of mortality.  --------------------------------------------------------------------------------------------------------------------------------------------  DO NOT REMOVE THIS RECORD WITHOUT FIRST NOTIFYING THE CDI SPECIALIST  This form is NOT a part of the permanent Medical Record.

## 2021-01-01 NOTE — SWALLOW BEDSIDE ASSESSMENT ADULT - SLP GENERAL OBSERVATIONS
pt with increased respiratory requirements. pt on 15L o2 via ventimask and therefore po trials not appropriate at this time.
Pt weak in bed o2 via NC. Responsive
DISCHARGE

## 2021-01-28 NOTE — H&P ADULT - NSHPPOAURINARYCATHETER_GEN_ALL_CORE
-- DO NOT REPLY / DO NOT REPLY ALL --  -- Message is from the [facility]--    COVID-19 Universal Screening: N/A - Not about scheduling    General Patient Message      Reason for Call: please give patient a call regarding foot swelling and ankle swelling please call patient regarding this issues    Caller Information       Type Contact Phone    01/26/2021 11:29 AM CST Phone (Incoming) MichaelSanta (Self) [phone] (M)          Alternative phone number:     Turnaround time given to caller:   \"This message will be sent to [state Provider's name]. The clinical team will fulfill your request as soon as they review your message.\"    
-- DO NOT REPLY / DO NOT REPLY ALL --  -- Message is from the [facility]--    Provider paged via [employer] Documentation - The below message was copied and pasted from a [employer] page:    [phone] PATIENT NUMBER -------------------------------- ACC NURSE LINE (IF QUESTIONS ONLY - [phone]) FROM: GLORIA ARENAS PATIENT DECLINED URGENT EVALUATION FOR ANKLE AND FOOT SWELLING LEFT MORE THAN RIGHT. ALSO HAVING BROWN THICK PHLEGM. PATIENT IS REQUESTING TO SPEAK WITH DR. RIZWANA ARENAS HAVING THIS BEFORE WITH PNEUMONIA AND NEEDS TO KNOW WHAT DO NOW. PATIENT CAN BE REACHED -[phone] TO ADVISE FURTHER. CAROLINA HARDIN      
LVM on auth # that antibiotic was sent and to start taking Miralax.   
Per other task, pt states she has begun taking abx.  
Pls let pt know that I sent antibiotics. She should take Miralax OTC for the BM.  
Spoke with Santa, she is concerned her pneumonia is coming back because her ankles, feet, and legs are swollen. The past 3 times she has had pneumonia, her first symptoms was swelling in her legs. They are mildly swollen. No pain, redness.  Denies increased salt intake. Encouraged her to elevate legs.     She also has concerns because she has had constipation d/t Morphine and has had to strain to have a BM. She usually takes a Ducolax BID but sometimes forgets. Yesterday she strained very hard and noted vaginal bleeding after her BM. Denies any blood in the underwear or soaking through pads quickly. She has a pain on the right lower side. H/O endometriosis.  ED precautions given if bleeding become constant.     She has a lazy esophagus and is supposed to be getting an appointment with [facility] to see an esophagus specialist and was told they would be contact her for an appointment.     She is wanting an antibiotic because she thinks she is getting pneumonia again, as well as a stool softener, but not a laxative.  
no

## 2021-12-08 NOTE — ED PROVIDER NOTE - SECONDARY DIAGNOSIS.
From: Starr Mireles  To: Gurjit Diaz. Duncan Vu MD  Sent: 12/8/2021 10:00 AM CST  Subject: Oxygen     I would like a response to my phone call, which I made to this office last week, regarding removal of oxygen from my home as I am not using the oxygen. Hypoxia Respiratory distress

## 2022-04-07 NOTE — SWALLOW BEDSIDE ASSESSMENT ADULT - ASPIRATION PRECAUTIONS
yes Consent: The rationale for the repair was explained to the patient and consent was obtained. The risks, benefits and alternatives to therapy were discussed in detail. Specifically, the risks of infection, scarring, bleeding, prolonged wound healing, incomplete removal, allergy to anesthesia, nerve injury and recurrence were addressed. Prior to the procedure, the treatment site was clearly identified and confirmed by the patient. All components of Universal Protocol/PAUSE Rule completed.

## 2022-04-29 NOTE — PHYSICAL THERAPY INITIAL EVALUATION ADULT - PHYSICAL ASSIST/NONPHYSICAL ASSIST: SIT/SUPINE, REHAB EVAL
[address] [facility] [address] [place], [place] [address]    Phone * [phone]   Surgical Scheduling Direct line Phone * [phone]  Fax * [phone]      [place]      1994    female    [address] [id number]   Marital Status:         Home Phone: [phone]   Cell Phone:   Telephone Information:   Mobile [phone]              Surgeon: Dr. Muna Hood  Surgery Date:06-   Time: ROMEL Booker     Procedure: Laparoscopic cholecystectomy possible open  Outpatient     Diagnosis: Biliary dyskinesia/ Right upper quadrant pain    Important Medical History: In Epic    Special Inst/Equip:     CPT Codes: 47991    Latex Allergy:   no Cardiac Device:  no    Anesthesia Type: General    Case Location:  Main OR     Preadmission Testing: Phone Call      PAT Date and Time: ________________________________    PAT Confirmation #: _________________________________    Post Op Visit:  ______________________________________    Need Preop Cardiac Clearance:   no    Does patient have Cardiologist/physician?  No      Surgery Conformation #:  ______________________________________________    : __________________________________ Date:____________________        [employer] Name:  [facility]
Patient scheduled for surgery with Dr. Prince Pan on 06- at 9:00am with an arrival of 7:30am.  Preop orders (Hepatic Functions) and instructions given. Surgery consent signed. Antibacterial soap given.
1 person assist

## 2022-10-20 NOTE — PATIENT PROFILE ADULT - NSPROGENDIFFINTUB_GEN_A_NUR
never intubated Topical Retinoid counseling:  Patient advised to apply a pea-sized amount only at bedtime and wait 30 minutes after washing their face before applying.  If too drying, patient may add a non-comedogenic moisturizer. The patient verbalized understanding of the proper use and possible adverse effects of retinoids.  All of the patient's questions and concerns were addressed.

## 2025-03-13 NOTE — SWALLOW BEDSIDE ASSESSMENT ADULT - SWALLOW EVAL: DIAGNOSIS
Problem: PAIN - ADULT  Goal: Verbalizes/displays adequate comfort level or baseline comfort level  Description: Interventions:  - Encourage patient to monitor pain and request assistance  - Assess pain using appropriate pain scale  - Administer analgesics based on type and severity of pain and evaluate response  - Implement non-pharmacological measures as appropriate and evaluate response  - Consider cultural and social influences on pain and pain management  - Notify physician/advanced practitioner if interventions unsuccessful or patient reports new pain  Outcome: Progressing     Problem: INFECTION - ADULT  Goal: Absence or prevention of progression during hospitalization  Description: INTERVENTIONS:  - Assess and monitor for signs and symptoms of infection  - Monitor lab/diagnostic results  - Monitor all insertion sites, i.e. indwelling lines, tubes, and drains  - Monitor endotracheal if appropriate and nasal secretions for changes in amount and color  - [place] appropriate cooling/warming therapies per order  - Administer medications as ordered  - Instruct and encourage patient and family to use good hand hygiene technique  - Identify and instruct in appropriate isolation precautions for identified infection/condition  Outcome: Progressing  Goal: Absence of fever/infection during neutropenic period  Description: INTERVENTIONS:  - Monitor WBC    Outcome: Progressing     Problem: SAFETY ADULT  Goal: Patient will remain free of falls  Description: INTERVENTIONS:  - Educate patient/family on patient safety including physical limitations  - Instruct patient to call for assistance with activity   - Consult OT/PT to assist with strengthening/mobility   - Keep Call bell within reach  - Keep bed low and locked with side rails adjusted as appropriate  - Keep care items and personal belongings within reach  - Initiate and maintain comfort rounds  - Make Fall Risk Sign visible to staff  - Offer Toileting every 2 Hours, 
in advance of need  - Initiate/Maintain bed alarm  - Obtain necessary fall risk management equipment  - Apply yellow socks and bracelet for high fall risk patients  - Consider moving patient to room near nurses station  Outcome: Progressing  Goal: Maintain or return to baseline ADL function  Description: INTERVENTIONS:  -  Assess patient's ability to carry out ADLs; assess patient's baseline for ADL function and identify physical deficits which impact ability to perform ADLs (bathing, care of mouth/teeth, toileting, grooming, dressing, etc.)  - Assess/evaluate cause of self-care deficits   - Assess range of motion  - Assess patient's mobility; develop plan if impaired  - Assess patient's need for assistive devices and provide as appropriate  - Encourage maximum independence but intervene and supervise when necessary  - Involve family in performance of ADLs  - Assess for home care needs following discharge   - Consider OT consult to assist with ADL evaluation and planning for discharge  - Provide patient education as appropriate  Outcome: Progressing  Goal: Maintains/Returns to pre admission functional level  Description: INTERVENTIONS:  - Perform AM-PAC 6 Click Basic Mobility/ Daily Activity assessment daily.  - Set and communicate daily mobility goal to care team and patient/family/caregiver.   - Collaborate with rehabilitation services on mobility goals if consulted  - Perform Range of Motion 3 times a day.  - Reposition patient every 2 hours.  - Dangle patient 3 times a day  - Stand patient 3 times a day  - Ambulate patient 3 times a day  - Out of bed to chair 3 times a day   - Out of bed for meals 3 times a day  - Out of bed for toileting  - Record patient progress and toleration of activity level   Outcome: Progressing     Problem: DISCHARGE PLANNING  Goal: Discharge to home or other facility with appropriate resources  Description: INTERVENTIONS:  - Identify barriers to discharge w/patient and caregiver  - 
Arrange for needed discharge resources and transportation as appropriate  - Identify discharge learning needs (meds, wound care, etc.)  - Arrange for interpretive services to assist at discharge as needed  - Refer to Case Management Department for coordinating discharge planning if the patient needs post-hospital services based on physician/advanced practitioner order or complex needs related to functional status, cognitive ability, or social support system  Outcome: Progressing     Problem: Knowledge Deficit  Goal: Patient/family/caregiver demonstrates understanding of disease process, treatment plan, medications, and discharge instructions  Description: Complete learning assessment and assess knowledge base.  Interventions:  - Provide teaching at level of understanding  - Provide teaching via preferred learning methods  Outcome: Progressing     Problem: Prexisting or High Potential for Compromised Skin Integrity  Goal: Skin integrity is maintained or improved  Description: INTERVENTIONS:  - Identify patients at risk for skin breakdown  - Assess and monitor skin integrity  - Assess and monitor nutrition and hydration status  - Monitor labs   - Assess for incontinence   - Turn and reposition patient  - Assist with mobility/ambulation  - Relieve pressure over bony prominences  - Avoid friction and shearing  - Provide appropriate hygiene as needed including keeping skin clean and dry  - Evaluate need for skin moisturizer/barrier cream  - Collaborate with interdisciplinary team   - Patient/family teaching  - Consider wound care consult   Outcome: Progressing     Problem: Nutrition/Hydration-ADULT  Goal: Nutrient/Hydration intake appropriate for improving, restoring or maintaining nutritional needs  Description: Monitor and assess patient's nutrition/hydration status for malnutrition. Collaborate with interdisciplinary team and initiate plan and interventions as ordered.  Monitor patient's weight and dietary intake as 
pt is now requiring 15L o2 via venti mask therefore po trials not appropriate at this time
ordered or per policy. Utilize nutrition screening tool and intervene as necessary. Determine patient's food preferences and provide high-protein, high-caloric foods as appropriate.     INTERVENTIONS:  - Monitor oral intake, urinary output, labs, and treatment plans  - Assess nutrition and hydration status and recommend course of action  - Evaluate amount of meals eaten  - Assist patient with eating if necessary   - Allow adequate time for meals  - Recommend/ encourage appropriate diets, oral nutritional supplements, and vitamin/mineral supplements  - Order, calculate, and assess calorie counts as needed  - Recommend, monitor, and adjust tube feedings and TPN/PPN based on assessed needs  - Assess need for intravenous fluids  - Provide specific nutrition/hydration education as appropriate  - Include patient/family/caregiver in decisions related to nutrition  Outcome: Progressing     
Pt p/w white /orange secretions in mouth. Pt lethargic but responsive. Pt p/w severe oral dysphagia for puree. prolonged holding for thin liquids via straw sip but (+) overt toleration. Rec NPO by mouth for primary means of hydration/nutrition. Allow sips for pleasure via straw. Verbally cue Pt to swallow. Rec bedside suctioning and frequent oral care. Upright positioning  1:1 when giving drinks/meds